# Patient Record
Sex: FEMALE | Race: WHITE | NOT HISPANIC OR LATINO | Employment: FULL TIME | URBAN - METROPOLITAN AREA
[De-identification: names, ages, dates, MRNs, and addresses within clinical notes are randomized per-mention and may not be internally consistent; named-entity substitution may affect disease eponyms.]

---

## 2017-02-03 ENCOUNTER — OFFICE VISIT (OUTPATIENT)
Dept: FAMILY MEDICINE CLINIC | Facility: CLINIC | Age: 32
End: 2017-02-03

## 2017-02-03 VITALS
OXYGEN SATURATION: 98 % | DIASTOLIC BLOOD PRESSURE: 82 MMHG | SYSTOLIC BLOOD PRESSURE: 138 MMHG | HEART RATE: 84 BPM | TEMPERATURE: 97.9 F | BODY MASS INDEX: 44.9 KG/M2 | RESPIRATION RATE: 16 BRPM | WEIGHT: 263 LBS | HEIGHT: 64 IN

## 2017-02-03 DIAGNOSIS — F41.9 ANXIETY: Primary | ICD-10-CM

## 2017-02-03 LAB
ALBUMIN SERPL-MCNC: 4.5 G/DL (ref 3.2–4.8)
ALBUMIN/GLOB SERPL: 1.4 G/DL (ref 1.5–2.5)
ALP SERPL-CCNC: 81 U/L (ref 25–100)
ALT SERPL-CCNC: 24 U/L (ref 7–40)
AST SERPL-CCNC: 26 U/L (ref 0–33)
BASOPHILS # BLD AUTO: 0.03 10*3/MM3 (ref 0–0.2)
BASOPHILS NFR BLD AUTO: 0.3 % (ref 0–1)
BILIRUB SERPL-MCNC: 0.3 MG/DL (ref 0.3–1.2)
BUN SERPL-MCNC: 11 MG/DL (ref 9–23)
BUN/CREAT SERPL: 15.7 (ref 7–25)
CALCIUM SERPL-MCNC: 9.9 MG/DL (ref 8.7–10.4)
CHLORIDE SERPL-SCNC: 107 MMOL/L (ref 99–109)
CHOLEST SERPL-MCNC: 219 MG/DL (ref 0–200)
CO2 SERPL-SCNC: 26 MMOL/L (ref 20–31)
CREAT SERPL-MCNC: 0.7 MG/DL (ref 0.6–1.3)
EOSINOPHIL # BLD AUTO: 0.11 10*3/MM3 (ref 0.1–0.3)
EOSINOPHIL NFR BLD AUTO: 0.9 % (ref 0–3)
ERYTHROCYTE [DISTWIDTH] IN BLOOD BY AUTOMATED COUNT: 13.6 % (ref 11.3–14.5)
GLOBULIN SER CALC-MCNC: 3.3 GM/DL
GLUCOSE SERPL-MCNC: 92 MG/DL (ref 70–100)
HCT VFR BLD AUTO: 41.6 % (ref 34.5–44)
HDLC SERPL-MCNC: 44 MG/DL (ref 40–60)
HGB BLD-MCNC: 14.1 G/DL (ref 11.5–15.5)
IMM GRANULOCYTES # BLD: 0.05 10*3/MM3 (ref 0–0.03)
IMM GRANULOCYTES NFR BLD: 0.4 % (ref 0–0.6)
LDLC SERPL CALC-MCNC: 149 MG/DL (ref 0–100)
LYMPHOCYTES # BLD AUTO: 2.51 10*3/MM3 (ref 0.6–4.8)
LYMPHOCYTES NFR BLD AUTO: 21 % (ref 24–44)
MCH RBC QN AUTO: 29.8 PG (ref 27–31)
MCHC RBC AUTO-ENTMCNC: 33.9 G/DL (ref 32–36)
MCV RBC AUTO: 87.9 FL (ref 80–99)
MONOCYTES # BLD AUTO: 0.51 10*3/MM3 (ref 0–1)
MONOCYTES NFR BLD AUTO: 4.3 % (ref 0–12)
NEUTROPHILS # BLD AUTO: 8.72 10*3/MM3 (ref 1.5–8.3)
NEUTROPHILS NFR BLD AUTO: 73.1 % (ref 41–71)
PLATELET # BLD AUTO: 318 10*3/MM3 (ref 150–450)
POTASSIUM SERPL-SCNC: 4.4 MMOL/L (ref 3.5–5.5)
PROT SERPL-MCNC: 7.8 G/DL (ref 5.7–8.2)
RBC # BLD AUTO: 4.73 10*6/MM3 (ref 3.89–5.14)
SODIUM SERPL-SCNC: 139 MMOL/L (ref 132–146)
TRIGL SERPL-MCNC: 131 MG/DL (ref 0–150)
TSH SERPL DL<=0.005 MIU/L-ACNC: 1.11 MIU/ML (ref 0.35–5.35)
VLDLC SERPL CALC-MCNC: 26.2 MG/DL
WBC # BLD AUTO: 11.93 10*3/MM3 (ref 3.5–10.8)

## 2017-02-03 PROCEDURE — 99203 OFFICE O/P NEW LOW 30 MIN: CPT | Performed by: FAMILY MEDICINE

## 2017-02-03 PROCEDURE — 36415 COLL VENOUS BLD VENIPUNCTURE: CPT | Performed by: FAMILY MEDICINE

## 2017-02-03 RX ORDER — PHENTERMINE HYDROCHLORIDE 30 MG/1
37.5 CAPSULE ORAL EVERY MORNING
COMMUNITY
End: 2017-07-27

## 2017-02-03 NOTE — PROGRESS NOTES
Subjective   Souleymane Grossman is a 31 y.o. female.     History of Present Illness   Establish Care- has a primary care in [place] that retired.  Has a history of Anxiety. On zoloft and uses xanax as needed. Pt concerned about ADD. Went to [facility] and was not diagnosed with ADD. Pt concerned about decreased focus and concentration.  C/O increased sweating on face. Periods regular. Not currently trying to get pregnant. Has has 100 lb weight gain in 2 years. + heat intolerance, night sweats.  The following portions of the patient's history were reviewed and updated as appropriate: allergies, current medications, past family history, past medical history, past social history, past surgical history and problem list.    Review of Systems   Constitutional: Negative for appetite change, chills, diaphoresis, fatigue, fever and unexpected weight change.   HENT: Negative for congestion, ear pain, mouth sores, postnasal drip, rhinorrhea, sinus pressure, sneezing, sore throat and trouble swallowing.    Eyes: Negative for pain, redness and visual disturbance.   Respiratory: Negative for apnea, cough, chest tightness, shortness of breath and wheezing.    Cardiovascular: Negative for chest pain, palpitations and leg swelling.   Gastrointestinal: Negative for abdominal distention, blood in stool, constipation, diarrhea and nausea.   Endocrine: Negative for cold intolerance, polydipsia, polyphagia and polyuria.   Genitourinary: Negative for difficulty urinating, dysuria, enuresis, flank pain and urgency.   Musculoskeletal: Negative for arthralgias, back pain, joint swelling, myalgias and neck pain.   Skin: Negative for color change, rash and wound.   Neurological: Negative for dizziness, syncope, weakness, light-headedness and numbness.   Hematological: Negative for adenopathy.   Psychiatric/Behavioral: Negative for agitation, behavioral problems and confusion. The patient is not nervous/anxious.         Objective   Physical Exam   Constitutional: She is oriented to person, place, and time. She appears well-developed and well-nourished. No distress.   HENT:   Right Ear: External ear normal.   Left Ear: External ear normal.   Nose: Nose normal.   Mouth/Throat: Oropharynx is clear and moist.   Eyes: Conjunctivae and EOM are normal. Pupils are equal, round, and reactive to light.   Neck: Normal range of motion. Neck supple. No thyromegaly present.   Cardiovascular: Normal rate, regular rhythm and normal heart sounds.    No murmur heard.  Pulmonary/Chest: Effort normal and breath sounds normal. No respiratory distress. She has no wheezes.   Abdominal: Soft. Bowel sounds are normal. She exhibits no distension and no mass. There is no tenderness. There is no rebound and no guarding. No hernia.   Musculoskeletal: Normal range of motion. She exhibits no edema or tenderness.   Lymphadenopathy:     She has no cervical adenopathy.   Neurological: She is alert and oriented to person, place, and time. She has normal reflexes.   Skin: Skin is warm and dry. No rash noted. She is not diaphoretic. No erythema. No pallor.   Psychiatric: She has a normal mood and affect. Her behavior is normal. Judgment and thought content normal.   Nursing note and vitals reviewed.      Assessment/Plan   Souleymane was seen today for establish care.    Diagnoses and all orders for this visit:    Anxiety  -     CBC & Differential  -     Comprehensive Metabolic Panel  -     Lipid Panel  -     TSH    I have recommended the patient follow-up with behavioral health for correct medication management

## 2017-04-28 LAB
EXTERNAL CHLAMYDIA SCREEN: NEGATIVE
EXTERNAL CHLAMYDIA SCREEN: POSITIVE
EXTERNAL GONORRHEA SCREEN: NEGATIVE
EXTERNAL HERPES PCR: POSITIVE
EXTERNAL RUBELLA QUALITATIVE: NORMAL
EXTERNAL SYPHILIS RPR SCREEN: NEGATIVE
EXTERNAL URINE DRUG SCREEN: NEGATIVE
HIV1 P24 AG SERPL QL IA: NEGATIVE

## 2017-07-27 ENCOUNTER — OFFICE VISIT (OUTPATIENT)
Dept: OBSTETRICS AND GYNECOLOGY | Facility: HOSPITAL | Age: 32
End: 2017-07-27

## 2017-07-27 ENCOUNTER — HOSPITAL ENCOUNTER (OUTPATIENT)
Dept: WOMENS IMAGING | Facility: HOSPITAL | Age: 32
Discharge: HOME | End: 2017-07-27
Admitting: ADVANCED PRACTICE MIDWIFE

## 2017-07-27 VITALS — BODY MASS INDEX: 48.71 KG/M2 | WEIGHT: 283.8 LBS | SYSTOLIC BLOOD PRESSURE: 123 MMHG | DIASTOLIC BLOOD PRESSURE: 79 MMHG

## 2017-07-27 DIAGNOSIS — O24.410 DIET CONTROLLED GESTATIONAL DIABETES MELLITUS (GDM), ANTEPARTUM: Primary | ICD-10-CM

## 2017-07-27 DIAGNOSIS — O24.419 GESTATIONAL DIABETES MELLITUS IN PREGNANCY: ICD-10-CM

## 2017-07-27 DIAGNOSIS — O35.9XX0 TERATOGEN EXPOSURE IN CURRENT PREGNANCY, NOT APPLICABLE OR UNSPECIFIED FETUS: ICD-10-CM

## 2017-07-27 PROCEDURE — 99241 PR OFFICE CONSULTATION NEW/ESTAB PATIENT 15 MIN: CPT | Performed by: OBSTETRICS & GYNECOLOGY

## 2017-07-27 PROCEDURE — 76811 OB US DETAILED SNGL FETUS: CPT | Performed by: OBSTETRICS & GYNECOLOGY

## 2017-07-27 PROCEDURE — 76811 OB US DETAILED SNGL FETUS: CPT

## 2017-07-27 RX ORDER — HYDROXYZINE 50 MG/1
50 TABLET, FILM COATED ORAL 3 TIMES DAILY PRN
COMMUNITY
End: 2017-11-18 | Stop reason: HOSPADM

## 2017-07-27 RX ORDER — DOXYLAMINE SUCCINATE AND PYRIDOXINE HYDROCHLORIDE, DELAYED RELEASE TABLETS 10 MG/10 MG 10; 10 MG/1; MG/1
2 TABLET, DELAYED RELEASE ORAL NIGHTLY PRN
COMMUNITY
End: 2017-11-18 | Stop reason: HOSPADM

## 2017-07-27 NOTE — PROGRESS NOTES
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 States had abnormal 3 hour glucose test about 2-3 weeks ago. Has not had diabetes education this pregnancy. Is checking FSBS fasting and one hour after meals. See glucose values.  Declined genetic screening. Does not have any further appointments with Ms. Husainney; plans transfer of care to deliver at [facility]. States Dr. Kumar has been recommended by friends and she is amenable to seeing her.   "

## 2017-08-07 ENCOUNTER — APPOINTMENT (OUTPATIENT)
Dept: ULTRASOUND IMAGING | Facility: HOSPITAL | Age: 32
End: 2017-08-07

## 2017-08-07 ENCOUNTER — HOSPITAL ENCOUNTER (OUTPATIENT)
Facility: HOSPITAL | Age: 32
Setting detail: OBSERVATION
LOS: 1 days | Discharge: HOME | End: 2017-08-08
Attending: OBSTETRICS & GYNECOLOGY | Admitting: OBSTETRICS & GYNECOLOGY

## 2017-08-07 DIAGNOSIS — O24.410 DIET CONTROLLED GESTATIONAL DIABETES MELLITUS (GDM), ANTEPARTUM: Primary | ICD-10-CM

## 2017-08-07 DIAGNOSIS — O35.9XX0 TERATOGEN EXPOSURE IN CURRENT PREGNANCY, NOT APPLICABLE OR UNSPECIFIED FETUS: ICD-10-CM

## 2017-08-07 DIAGNOSIS — Z3A.23 23 WEEKS GESTATION OF PREGNANCY: ICD-10-CM

## 2017-08-07 PROBLEM — Z34.90 PREGNANCY: Status: ACTIVE | Noted: 2017-08-07

## 2017-08-07 LAB
ALBUMIN SERPL-MCNC: 3.9 G/DL (ref 3.2–4.8)
ALBUMIN/GLOB SERPL: 1.3 G/DL (ref 1.5–2.5)
ALP SERPL-CCNC: 54 U/L (ref 25–100)
ALT SERPL W P-5'-P-CCNC: 14 U/L (ref 7–40)
ANION GAP SERPL CALCULATED.3IONS-SCNC: 6 MMOL/L (ref 3–11)
AST SERPL-CCNC: 15 U/L (ref 0–33)
BACTERIA UR QL AUTO: ABNORMAL /HPF
BILIRUB SERPL-MCNC: 0.2 MG/DL (ref 0.3–1.2)
BILIRUB UR QL STRIP: ABNORMAL
BUN BLD-MCNC: 10 MG/DL (ref 9–23)
BUN/CREAT SERPL: 16.7 (ref 7–25)
CALCIUM SPEC-SCNC: 9.3 MG/DL (ref 8.7–10.4)
CHLORIDE SERPL-SCNC: 108 MMOL/L (ref 99–109)
CLARITY UR: ABNORMAL
CO2 SERPL-SCNC: 23 MMOL/L (ref 20–31)
COLOR UR: ABNORMAL
CREAT BLD-MCNC: 0.6 MG/DL (ref 0.6–1.3)
DEPRECATED RDW RBC AUTO: 44.4 FL (ref 37–54)
ERYTHROCYTE [DISTWIDTH] IN BLOOD BY AUTOMATED COUNT: 14 % (ref 11.3–14.5)
GFR SERPL CREATININE-BSD FRML MDRD: 115 ML/MIN/1.73
GLOBULIN UR ELPH-MCNC: 2.9 GM/DL
GLUCOSE BLD-MCNC: 111 MG/DL (ref 70–100)
GLUCOSE BLDC GLUCOMTR-MCNC: 154 MG/DL (ref 70–130)
GLUCOSE UR STRIP-MCNC: NEGATIVE MG/DL
HCT VFR BLD AUTO: 35.6 % (ref 34.5–44)
HGB BLD-MCNC: 12.1 G/DL (ref 11.5–15.5)
HGB UR QL STRIP.AUTO: ABNORMAL
HYALINE CASTS UR QL AUTO: ABNORMAL /LPF
KETONES UR QL STRIP: ABNORMAL
LEUKOCYTE ESTERASE UR QL STRIP.AUTO: ABNORMAL
MCH RBC QN AUTO: 29.6 PG (ref 27–31)
MCHC RBC AUTO-ENTMCNC: 34 G/DL (ref 32–36)
MCV RBC AUTO: 87 FL (ref 80–99)
NITRITE UR QL STRIP: POSITIVE
PH UR STRIP.AUTO: 6.5 [PH] (ref 5–8)
PLATELET # BLD AUTO: 299 10*3/MM3 (ref 150–450)
PMV BLD AUTO: 10.4 FL (ref 6–12)
POTASSIUM BLD-SCNC: 3.9 MMOL/L (ref 3.5–5.5)
PROT SERPL-MCNC: 6.8 G/DL (ref 5.7–8.2)
PROT UR QL STRIP: ABNORMAL
RBC # BLD AUTO: 4.09 10*6/MM3 (ref 3.89–5.14)
RBC # UR: ABNORMAL /HPF
REF LAB TEST METHOD: ABNORMAL
SODIUM BLD-SCNC: 137 MMOL/L (ref 132–146)
SP GR UR STRIP: 1.02 (ref 1–1.03)
SQUAMOUS #/AREA URNS HPF: ABNORMAL /HPF
UROBILINOGEN UR QL STRIP: ABNORMAL
WBC NRBC COR # BLD: 18.58 10*3/MM3 (ref 3.5–10.8)
WBC UR QL AUTO: ABNORMAL /HPF
YEAST URNS QL MICRO: ABNORMAL /HPF

## 2017-08-07 PROCEDURE — 25010000002 THIAMINE PER 100 MG: Performed by: OBSTETRICS & GYNECOLOGY

## 2017-08-07 PROCEDURE — 82962 GLUCOSE BLOOD TEST: CPT

## 2017-08-07 PROCEDURE — 81001 URINALYSIS AUTO W/SCOPE: CPT | Performed by: OBSTETRICS & GYNECOLOGY

## 2017-08-07 PROCEDURE — 80053 COMPREHEN METABOLIC PANEL: CPT | Performed by: OBSTETRICS & GYNECOLOGY

## 2017-08-07 PROCEDURE — 25010000002 PYRIDOXINE PER 100 MG: Performed by: OBSTETRICS & GYNECOLOGY

## 2017-08-07 PROCEDURE — 96375 TX/PRO/DX INJ NEW DRUG ADDON: CPT

## 2017-08-07 PROCEDURE — 25010000002 BUTORPHANOL PER 1 MG: Performed by: OBSTETRICS & GYNECOLOGY

## 2017-08-07 PROCEDURE — 96366 THER/PROPH/DIAG IV INF ADDON: CPT

## 2017-08-07 PROCEDURE — 99218 PR INITIAL OBSERVATION CARE/DAY 30 MINUTES: CPT | Performed by: OBSTETRICS & GYNECOLOGY

## 2017-08-07 PROCEDURE — 25010000002 HYDROMORPHONE PER 4 MG: Performed by: OBSTETRICS & GYNECOLOGY

## 2017-08-07 PROCEDURE — G0378 HOSPITAL OBSERVATION PER HR: HCPCS

## 2017-08-07 PROCEDURE — 76775 US EXAM ABDO BACK WALL LIM: CPT

## 2017-08-07 PROCEDURE — 25010000002 PROMETHAZINE PER 50 MG: Performed by: OBSTETRICS & GYNECOLOGY

## 2017-08-07 PROCEDURE — 96376 TX/PRO/DX INJ SAME DRUG ADON: CPT

## 2017-08-07 PROCEDURE — 25010000002 ONDANSETRON PER 1 MG: Performed by: OBSTETRICS & GYNECOLOGY

## 2017-08-07 PROCEDURE — 85027 COMPLETE CBC AUTOMATED: CPT | Performed by: OBSTETRICS & GYNECOLOGY

## 2017-08-07 PROCEDURE — 96365 THER/PROPH/DIAG IV INF INIT: CPT

## 2017-08-07 RX ORDER — SODIUM CHLORIDE, SODIUM LACTATE, POTASSIUM CHLORIDE, CALCIUM CHLORIDE 600; 310; 30; 20 MG/100ML; MG/100ML; MG/100ML; MG/100ML
125 INJECTION, SOLUTION INTRAVENOUS CONTINUOUS
Status: DISCONTINUED | OUTPATIENT
Start: 2017-08-07 | End: 2017-08-08 | Stop reason: HOSPADM

## 2017-08-07 RX ORDER — SODIUM CHLORIDE 0.9 % (FLUSH) 0.9 %
1-10 SYRINGE (ML) INJECTION AS NEEDED
Status: DISCONTINUED | OUTPATIENT
Start: 2017-08-07 | End: 2017-08-08 | Stop reason: HOSPADM

## 2017-08-07 RX ORDER — HYDROXYZINE HYDROCHLORIDE 25 MG/1
50 TABLET, FILM COATED ORAL 3 TIMES DAILY PRN
Status: DISCONTINUED | OUTPATIENT
Start: 2017-08-07 | End: 2017-08-08 | Stop reason: HOSPADM

## 2017-08-07 RX ORDER — PROMETHAZINE HYDROCHLORIDE 25 MG/ML
12.5 INJECTION, SOLUTION INTRAMUSCULAR; INTRAVENOUS ONCE
Status: COMPLETED | OUTPATIENT
Start: 2017-08-07 | End: 2017-08-07

## 2017-08-07 RX ORDER — ONDANSETRON 2 MG/ML
4 INJECTION INTRAMUSCULAR; INTRAVENOUS EVERY 6 HOURS PRN
Status: DISCONTINUED | OUTPATIENT
Start: 2017-08-07 | End: 2017-08-08 | Stop reason: HOSPADM

## 2017-08-07 RX ORDER — ONDANSETRON 4 MG/1
8 TABLET, FILM COATED ORAL EVERY 8 HOURS PRN
Status: DISCONTINUED | OUTPATIENT
Start: 2017-08-07 | End: 2017-08-08 | Stop reason: HOSPADM

## 2017-08-07 RX ORDER — SERTRALINE HYDROCHLORIDE 100 MG/1
100 TABLET, FILM COATED ORAL DAILY
Status: DISCONTINUED | OUTPATIENT
Start: 2017-08-07 | End: 2017-08-08 | Stop reason: HOSPADM

## 2017-08-07 RX ORDER — NALOXONE HCL 0.4 MG/ML
0.4 VIAL (ML) INJECTION
Status: DISCONTINUED | OUTPATIENT
Start: 2017-08-07 | End: 2017-08-08 | Stop reason: HOSPADM

## 2017-08-07 RX ADMIN — BUTORPHANOL TARTRATE 2 MG: 2 INJECTION, SOLUTION INTRAMUSCULAR; INTRAVENOUS at 15:10

## 2017-08-07 RX ADMIN — PROMETHAZINE HYDROCHLORIDE 12.5 MG: 25 INJECTION, SOLUTION INTRAMUSCULAR; INTRAVENOUS at 22:13

## 2017-08-07 RX ADMIN — ONDANSETRON 4 MG: 2 INJECTION INTRAMUSCULAR; INTRAVENOUS at 13:54

## 2017-08-07 RX ADMIN — HYDROMORPHONE HYDROCHLORIDE 1 MG: 1 INJECTION, SOLUTION INTRAMUSCULAR; INTRAVENOUS; SUBCUTANEOUS at 22:13

## 2017-08-07 RX ADMIN — SODIUM CHLORIDE, POTASSIUM CHLORIDE, SODIUM LACTATE AND CALCIUM CHLORIDE 1000 ML: 600; 310; 30; 20 INJECTION, SOLUTION INTRAVENOUS at 13:51

## 2017-08-07 RX ADMIN — HYDROMORPHONE HYDROCHLORIDE 1 MG: 1 INJECTION, SOLUTION INTRAMUSCULAR; INTRAVENOUS; SUBCUTANEOUS at 20:08

## 2017-08-07 RX ADMIN — THIAMINE HYDROCHLORIDE 125 ML/HR: 100 INJECTION, SOLUTION INTRAMUSCULAR; INTRAVENOUS at 14:02

## 2017-08-07 RX ADMIN — SODIUM CHLORIDE, POTASSIUM CHLORIDE, SODIUM LACTATE AND CALCIUM CHLORIDE 125 ML/HR: 600; 310; 30; 20 INJECTION, SOLUTION INTRAVENOUS at 14:23

## 2017-08-07 RX ADMIN — HYDROMORPHONE HYDROCHLORIDE 1 MG: 1 INJECTION, SOLUTION INTRAMUSCULAR; INTRAVENOUS; SUBCUTANEOUS at 13:47

## 2017-08-07 NOTE — H&P
[facility]  Obstetric History and Physical    No chief complaint on file.      Subjective     Patient is a 32 y.o. female  currently at 23w6d, who presents with c/o acute onset left flank pain 1400 yesterday. She rates pain 7/10, pain intermittent with blood noted in urine. She denies fever, ROM, regular contractions,or any other associated symptoms.PNC complicated by GDMA1 well controlled.      Her prenatal care is complicated by  diabetes  GDM A1\ .    The following portions of the patients history were reviewed and updated as appropriate: current medications, allergies, past medical history, past surgical history, past family history, past social history and problem list .       Prenatal Information:   Maternal Prenatal Labs  Blood Type No results found for: ABO   Rh Status No results found for: RH   Antibody Screen No results found for: ABSCRN   Gonnorhea No results found for: GCCX   Chlamydia No results found for: CLAMYDCU   RPR No results found for: RPR   Syphilis Antibody No results found for: SYPHILIS   Rubella No results found for: RUBELLAIGGIN   Hepatitis B Surface Antigen No results found for: HEPBSAG   HIV-1 Antibody No results found for: LABHIV1   Hepatitis C Antibody No results found for: HEPCAB   Rapid Urin Drug Screen No results found for: AMPMETHU, BARBITSCNUR, LABBENZSCN, LABMETHSCN, LABOPIASCN, THCURSCR, COCAINEUR, AMPHETSCREEN, PROPOXSCN, BUPRENORSCNU, METAMPSCNUR, OXYCODONESCN, TRICYCLICSCN   Group B Strep Culture No results found for: GBSANTIGEN                 Past OB History:       Obstetric History       T1      TAB0   SAB0   E0   M0   L1       # Outcome Date GA Lbr Vincent/2nd Weight Sex Delivery Anes PTL Lv   2 Current            1 Term 14 38w0d  7 lb 3 oz (3.26 kg) F Vag-Spont  N Y      Complications: Gestational diabetes,Gestational hypertension,Status post induction of labor          Past Medical History: Past Medical History:   Diagnosis Date   • Anxiety      PCP manages   • Anxiety    • Blood in urine    • Chills    • Chronic headaches    • Fatigue    • Gestational diabetes 2017    took oral meds in 2014 pregnancy; recently dx in 2017 pregnancy    • Kidney stones    • Mood changes    • Nausea    • Night sweats    • Obesity, Class III, BMI 40-49.9 (morbid obesity)    • Weight gain       Past Surgical History Past Surgical History:   Procedure Laterality Date   • CYSTOSCOPY BLADDER STONE LITHOTRIPSY  02/2017    reports has 2 remaining stones   • WISDOM TOOTH EXTRACTION     • WISDOM TOOTH EXTRACTION        Family History: Family History   Problem Relation Age of Onset   • Hypertension Father    • Diabetes Mother    • Cancer Maternal Grandmother    • Diabetes Brother       Social History:  reports that she has been smoking Cigarettes.  She has been smoking about 0.25 packs per day. She has never used smokeless tobacco.   reports that she does not drink alcohol.   reports that she does not use illicit drugs.                   General ROS Negative Findings:Headaches, Visual Changes, Epigastric pain, Anorexia, ROM and Vaginal Bleeding    ROS      Objective       Vital Signs Range for the last 24 hours  Temperature: Temp:  [98.2 °F (36.8 °C)] 98.2 °F (36.8 °C)   Temp Source:     BP:     Pulse:     Respirations: Resp:  [20] 20   SPO2:     O2 Amount (l/min):     O2 Devices     Weight:       Physical Examination:   General:   alert, appears stated age and cooperative   Skin:   normal   HEENT:     Lungs:   clear to auscultation bilaterally   Heart:   regular rate and rhythm, S1, S2 normal, no murmur, click, rub or gallop   Abdomen:  soft, gravid uterus non tender  No guarding no rebound, neg CVA tenderness   Lower Extremeties Tr edema, no calf tenderness, DTR 2+/4   Pelvis:  Exam deferred.         Presentation:    Cervix: Exam by:     Dilation:     Effacement:     Station:         Fetal Heart Rate Assessment   Method:     Beats/min:     Baseline:     Varibility:     Accels:      Decels:     Tracing Category:       Uterine Assessment   Method:     Frequency (min):     Ctx Count in 10 min:     Duration:     Intensity:     Intensity by IUPC:     Resting Tone:     Resting Tone by IUPC:     Harish Units:       Laboratory Results: @coonbamj25@  Radiology Review:@lastrad@  Other Studies:    Assessment/Plan     Active Problems:    Diet controlled gestational diabetes mellitus (GDM), antepartum    Pregnancy        Assessment:  1.  Intrauterine pregnancy at 23w6d weeks gestation with reassuring fetal status.    2.  Left flank ( gross hematuria) suspect ureteral sone  3.  Hx of stones S/P lithotripsy 2/17   4.      Plan:  1. IV hydration, pain control, renal U/S, CMP, UA, and cbc  2. Plan of care has been reviewed with patient.  3.  Risks, benefits of treatment plan have been discussed.  4.  All questions have been answered.  5      Sam Munoz DO  8/7/2017  1:24 PM

## 2017-08-08 VITALS
WEIGHT: 280 LBS | TEMPERATURE: 98 F | HEART RATE: 82 BPM | BODY MASS INDEX: 47.8 KG/M2 | HEIGHT: 64 IN | DIASTOLIC BLOOD PRESSURE: 63 MMHG | SYSTOLIC BLOOD PRESSURE: 124 MMHG | RESPIRATION RATE: 18 BRPM

## 2017-08-08 PROBLEM — O35.9XX0 TERATOGEN EXPOSURE IN CURRENT PREGNANCY: Status: RESOLVED | Noted: 2017-07-27 | Resolved: 2017-08-08

## 2017-08-08 PROBLEM — Z34.90 PREGNANCY: Status: RESOLVED | Noted: 2017-08-07 | Resolved: 2017-08-08

## 2017-08-08 PROBLEM — O24.410 DIET CONTROLLED GESTATIONAL DIABETES MELLITUS (GDM), ANTEPARTUM: Status: RESOLVED | Noted: 2017-07-27 | Resolved: 2017-08-08

## 2017-08-08 LAB
GLUCOSE BLDC GLUCOMTR-MCNC: 111 MG/DL (ref 70–130)
GLUCOSE BLDC GLUCOMTR-MCNC: 111 MG/DL (ref 70–130)
GLUCOSE BLDC GLUCOMTR-MCNC: 119 MG/DL (ref 70–130)

## 2017-08-08 PROCEDURE — 96366 THER/PROPH/DIAG IV INF ADDON: CPT

## 2017-08-08 PROCEDURE — 25010000002 THIAMINE PER 100 MG: Performed by: OBSTETRICS & GYNECOLOGY

## 2017-08-08 PROCEDURE — 25010000002 PYRIDOXINE PER 100 MG: Performed by: OBSTETRICS & GYNECOLOGY

## 2017-08-08 PROCEDURE — 96376 TX/PRO/DX INJ SAME DRUG ADON: CPT

## 2017-08-08 PROCEDURE — 25010000002 PROMETHAZINE PER 50 MG: Performed by: OBSTETRICS & GYNECOLOGY

## 2017-08-08 PROCEDURE — G0378 HOSPITAL OBSERVATION PER HR: HCPCS

## 2017-08-08 PROCEDURE — 25010000002 HYDROMORPHONE PER 4 MG: Performed by: OBSTETRICS & GYNECOLOGY

## 2017-08-08 PROCEDURE — 82962 GLUCOSE BLOOD TEST: CPT

## 2017-08-08 RX ORDER — NITROFURANTOIN 25; 75 MG/1; MG/1
100 CAPSULE ORAL EVERY 12 HOURS SCHEDULED
Qty: 14 CAPSULE | Refills: 0 | Status: SHIPPED | OUTPATIENT
Start: 2017-08-08 | End: 2017-08-15

## 2017-08-08 RX ORDER — PROMETHAZINE HYDROCHLORIDE 25 MG/ML
12.5 INJECTION, SOLUTION INTRAMUSCULAR; INTRAVENOUS EVERY 6 HOURS PRN
Status: DISCONTINUED | OUTPATIENT
Start: 2017-08-08 | End: 2017-08-08 | Stop reason: HOSPADM

## 2017-08-08 RX ORDER — ACETAMINOPHEN 325 MG/1
650 TABLET ORAL 4 TIMES DAILY PRN
Status: DISCONTINUED | OUTPATIENT
Start: 2017-08-08 | End: 2017-08-08 | Stop reason: HOSPADM

## 2017-08-08 RX ORDER — OXYCODONE HYDROCHLORIDE AND ACETAMINOPHEN 5; 325 MG/1; MG/1
1 TABLET ORAL EVERY 4 HOURS PRN
Status: DISCONTINUED | OUTPATIENT
Start: 2017-08-08 | End: 2017-08-08 | Stop reason: HOSPADM

## 2017-08-08 RX ORDER — NITROFURANTOIN 25; 75 MG/1; MG/1
100 CAPSULE ORAL EVERY 12 HOURS SCHEDULED
Status: DISCONTINUED | OUTPATIENT
Start: 2017-08-08 | End: 2017-08-08 | Stop reason: HOSPADM

## 2017-08-08 RX ORDER — OXYCODONE HYDROCHLORIDE AND ACETAMINOPHEN 5; 325 MG/1; MG/1
1 TABLET ORAL EVERY 4 HOURS PRN
Qty: 24 TABLET | Refills: 0 | Status: SHIPPED | OUTPATIENT
Start: 2017-08-08 | End: 2017-08-18

## 2017-08-08 RX ADMIN — ACETAMINOPHEN 650 MG: 325 TABLET, FILM COATED ORAL at 09:30

## 2017-08-08 RX ADMIN — NITROFURANTOIN (MONOHYDRATE/MACROCRYSTALS) 100 MG: 75; 25 CAPSULE ORAL at 09:30

## 2017-08-08 RX ADMIN — HYDROMORPHONE HYDROCHLORIDE 1 MG: 1 INJECTION, SOLUTION INTRAMUSCULAR; INTRAVENOUS; SUBCUTANEOUS at 05:33

## 2017-08-08 RX ADMIN — HYDROMORPHONE HYDROCHLORIDE 1 MG: 1 INJECTION, SOLUTION INTRAMUSCULAR; INTRAVENOUS; SUBCUTANEOUS at 02:49

## 2017-08-08 RX ADMIN — SERTRALINE 100 MG: 100 TABLET, FILM COATED ORAL at 08:02

## 2017-08-08 RX ADMIN — OXYCODONE AND ACETAMINOPHEN 1 TABLET: 5; 325 TABLET ORAL at 11:36

## 2017-08-08 RX ADMIN — SODIUM CHLORIDE, POTASSIUM CHLORIDE, SODIUM LACTATE AND CALCIUM CHLORIDE 125 ML/HR: 600; 310; 30; 20 INJECTION, SOLUTION INTRAVENOUS at 02:52

## 2017-08-08 RX ADMIN — THIAMINE HYDROCHLORIDE 125 ML/HR: 100 INJECTION, SOLUTION INTRAMUSCULAR; INTRAVENOUS at 09:00

## 2017-08-08 RX ADMIN — HYDROMORPHONE HYDROCHLORIDE 1 MG: 1 INJECTION, SOLUTION INTRAMUSCULAR; INTRAVENOUS; SUBCUTANEOUS at 08:07

## 2017-08-08 RX ADMIN — PROMETHAZINE HYDROCHLORIDE 12.5 MG: 25 INJECTION INTRAMUSCULAR; INTRAVENOUS at 05:32

## 2017-08-08 NOTE — PROGRESS NOTES
Patient has had no more pain.  She had a normal renal ultrasound.  He is going to go home now on Macrobid 100 twice a day Percocet and Phenergan he will return to see us in 2 weeks.

## 2017-08-08 NOTE — NURSING NOTE
1230- d/c instructions given to pt to include uti sx,  labor precautions, caring for iv infiltration site, and when to return to hospital. Follow up appt made for pt for  @10:20, pt refused wc transfer. Pt ambulating to private vehicle accompanied by  and has all belongings

## 2017-08-08 NOTE — DISCHARGE SUMMARY
[facility]  Patient Name: Souleymane Grossman  : 1985  MRN: [id number]  Date of Service: 2017  Referring Provider: Hanna Kirby     ID: 32 y.o.  at 24w0d    Admission Diagnosis: Pregnancy [Z33.1]  Diet controlled gestational diabetes mellitus (GDM), antepartum [O24.410]    Discharge Diagnosis:   Patient Active Problem List   Diagnosis   (none) - all problems resolved or deleted       Date of Admission: 2017 12:36 PM    Date of Discharge: 2017    Discharge Condition: Stable    Discharge to: Home    Discharge Medications:    Souleymane Grossman   Home Medication Instructions LESA:[id number]    Printed on:17 1206   Medication Information                      ALPRAZolam (XANAX) 1 MG tablet  1 mg. Took 1/2 tablet once 2017.             doxylamine-pyridoxine (DICLEGIS) 10-10 MG tablet delayed-release EC tablet  Take 2 tablets by mouth At Night As Needed.             hydrOXYzine (ATARAX) 50 MG tablet  Take 50 mg by mouth 3 (Three) Times a Day As Needed for Anxiety.             nitrofurantoin, macrocrystal-monohydrate, (MACROBID) 100 MG capsule  Take 1 capsule by mouth Every 12 (Twelve) Hours for 7 days. Indications: Prevention of Frequently Occuring Urinary Tract Infections             ondansetron (ZOFRAN) 8 MG tablet  Take 1 tablet by mouth Every 8 (Eight) Hours As Needed for nausea or vomiting.             oxyCODONE-acetaminophen (PERCOCET) 5-325 MG per tablet  Take 1 tablet by mouth Every 4 (Four) Hours As Needed for Moderate Pain (4-6) for up to 10 days.             sertraline (ZOLOFT) 100 MG tablet  Take 100 mg by mouth Daily. Recently restarted @ 50 mg daily, will increase to 100 mg.                 Discharge Diet:     Discharge Activity:    Follow up appointments:   Your Scheduled Appointments     Sep 21, 2017 11:00 AM EDT   Follow Up with [facility] DEPT SCHEDULE   [facility] (--)    [address] [facility] [address]   [phone]           Arrive 15  minutes prior to appointment.            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                  Hospital summary:      Souleymane was admitted for <principal problem not specified>.    She did not receive a course of  corticosteroids during her admission.      She did not receive magnesium sulfate during her admission.      Her obstetric ultrasounds and fetal testing were reassuring during her admission.  Her condition was determined to be appropriate for outpatient management and she was discharged home in stable condition.  She was instructed to follow up in 2 weeks with .    Hanna Kirby MD  12:06 PM

## 2017-09-21 ENCOUNTER — OFFICE VISIT (OUTPATIENT)
Dept: OBSTETRICS AND GYNECOLOGY | Facility: HOSPITAL | Age: 32
End: 2017-09-21

## 2017-09-21 ENCOUNTER — HOSPITAL ENCOUNTER (OUTPATIENT)
Dept: WOMENS IMAGING | Facility: HOSPITAL | Age: 32
Discharge: HOME | End: 2017-09-21
Attending: OBSTETRICS & GYNECOLOGY | Admitting: OBSTETRICS & GYNECOLOGY

## 2017-09-21 VITALS — SYSTOLIC BLOOD PRESSURE: 122 MMHG | WEIGHT: 286.4 LBS | DIASTOLIC BLOOD PRESSURE: 59 MMHG | BODY MASS INDEX: 49.16 KG/M2

## 2017-09-21 DIAGNOSIS — O35.9XX0 TERATOGEN EXPOSURE IN CURRENT PREGNANCY, NOT APPLICABLE OR UNSPECIFIED FETUS: ICD-10-CM

## 2017-09-21 DIAGNOSIS — O24.410 DIET CONTROLLED GESTATIONAL DIABETES MELLITUS (GDM), ANTEPARTUM: ICD-10-CM

## 2017-09-21 DIAGNOSIS — O24.419 GDM, CLASS A2: ICD-10-CM

## 2017-09-21 DIAGNOSIS — E66.01 MORBID OBESITY DUE TO EXCESS CALORIES (HCC): Primary | ICD-10-CM

## 2017-09-21 PROCEDURE — 76816 OB US FOLLOW-UP PER FETUS: CPT | Performed by: OBSTETRICS & GYNECOLOGY

## 2017-09-21 PROCEDURE — 76816 OB US FOLLOW-UP PER FETUS: CPT

## 2017-09-21 RX ORDER — RANITIDINE 150 MG/1
150 TABLET ORAL 2 TIMES DAILY PRN
COMMUNITY
End: 2017-11-18 | Stop reason: HOSPADM

## 2017-09-21 RX ORDER — LANCETS 28 GAUGE
EACH MISCELLANEOUS
COMMUNITY
Start: 2017-07-12 | End: 2019-12-12

## 2017-09-21 RX ORDER — BLOOD-GLUCOSE METER
KIT MISCELLANEOUS
COMMUNITY
Start: 2017-07-12 | End: 2017-11-18 | Stop reason: HOSPADM

## 2017-09-21 NOTE — PROGRESS NOTES
Denies problems. States Dr. Kirby is managing GDM; fasting glucose values are 114-120 and after meal values are normal per patient. To see Dr. Kirby today.

## 2017-09-21 NOTE — PROGRESS NOTES
Documentation of the ultrasound findings, images, and interpretations will be available in the patient's ViewPoint report located in the chart review imaging tab in [employer].

## 2017-10-30 ENCOUNTER — LAB REQUISITION (OUTPATIENT)
Dept: LAB | Facility: HOSPITAL | Age: 32
End: 2017-10-30

## 2017-10-30 DIAGNOSIS — Z34.83 ENCOUNTER FOR SUPERVISION OF OTHER NORMAL PREGNANCY, THIRD TRIMESTER: ICD-10-CM

## 2017-10-30 LAB — EXTERNAL GROUP B STREP ANTIGEN: NEGATIVE

## 2017-10-30 PROCEDURE — 87081 CULTURE SCREEN ONLY: CPT | Performed by: OBSTETRICS & GYNECOLOGY

## 2017-11-02 LAB — BACTERIA SPEC AEROBE CULT: NORMAL

## 2017-11-15 ENCOUNTER — PREP FOR SURGERY (OUTPATIENT)
Dept: OTHER | Facility: HOSPITAL | Age: 32
End: 2017-11-15

## 2017-11-15 RX ORDER — BUTORPHANOL TARTRATE 1 MG/ML
1 INJECTION, SOLUTION INTRAMUSCULAR; INTRAVENOUS
Status: CANCELLED | OUTPATIENT
Start: 2017-11-15

## 2017-11-15 RX ORDER — MISOPROSTOL 200 UG/1
800 TABLET ORAL AS NEEDED
Status: CANCELLED | OUTPATIENT
Start: 2017-11-15

## 2017-11-15 RX ORDER — MORPHINE SULFATE 2 MG/ML
2 INJECTION, SOLUTION INTRAMUSCULAR; INTRAVENOUS
Status: CANCELLED | OUTPATIENT
Start: 2017-11-15 | End: 2017-11-25

## 2017-11-15 RX ORDER — PROMETHAZINE HYDROCHLORIDE 12.5 MG/1
12.5 TABLET ORAL EVERY 6 HOURS PRN
Status: CANCELLED | OUTPATIENT
Start: 2017-11-15

## 2017-11-15 RX ORDER — PROMETHAZINE HYDROCHLORIDE 25 MG/ML
12.5 INJECTION, SOLUTION INTRAMUSCULAR; INTRAVENOUS EVERY 6 HOURS PRN
Status: CANCELLED | OUTPATIENT
Start: 2017-11-15

## 2017-11-15 RX ORDER — SODIUM CHLORIDE, SODIUM LACTATE, POTASSIUM CHLORIDE, CALCIUM CHLORIDE 600; 310; 30; 20 MG/100ML; MG/100ML; MG/100ML; MG/100ML
125 INJECTION, SOLUTION INTRAVENOUS CONTINUOUS
Status: CANCELLED | OUTPATIENT
Start: 2017-11-15

## 2017-11-15 RX ORDER — FAMOTIDINE 10 MG/ML
20 INJECTION, SOLUTION INTRAVENOUS EVERY 12 HOURS SCHEDULED
Status: CANCELLED | OUTPATIENT
Start: 2017-11-15

## 2017-11-15 RX ORDER — OXYTOCIN/RINGER'S LACTATE 20/1000 ML
125 PLASTIC BAG, INJECTION (ML) INTRAVENOUS CONTINUOUS PRN
Status: CANCELLED | OUTPATIENT
Start: 2017-11-15 | End: 2017-11-16

## 2017-11-15 RX ORDER — OXYCODONE AND ACETAMINOPHEN 7.5; 325 MG/1; MG/1
2 TABLET ORAL EVERY 4 HOURS PRN
Status: CANCELLED | OUTPATIENT
Start: 2017-11-15 | End: 2017-11-25

## 2017-11-15 RX ORDER — OXYTOCIN/RINGER'S LACTATE 30/500 ML
2-24 PLASTIC BAG, INJECTION (ML) INTRAVENOUS
Status: CANCELLED | OUTPATIENT
Start: 2017-11-16

## 2017-11-15 RX ORDER — CARBOPROST TROMETHAMINE 250 UG/ML
250 INJECTION, SOLUTION INTRAMUSCULAR AS NEEDED
Status: CANCELLED | OUTPATIENT
Start: 2017-11-15

## 2017-11-15 RX ORDER — OXYTOCIN/RINGER'S LACTATE 20/1000 ML
999 PLASTIC BAG, INJECTION (ML) INTRAVENOUS ONCE
Status: CANCELLED | OUTPATIENT
Start: 2017-11-15

## 2017-11-15 RX ORDER — TERBUTALINE SULFATE 1 MG/ML
0.25 INJECTION, SOLUTION SUBCUTANEOUS AS NEEDED
Status: CANCELLED | OUTPATIENT
Start: 2017-11-15

## 2017-11-15 RX ORDER — PROMETHAZINE HYDROCHLORIDE 12.5 MG/1
12.5 SUPPOSITORY RECTAL EVERY 6 HOURS PRN
Status: CANCELLED | OUTPATIENT
Start: 2017-11-15

## 2017-11-15 RX ORDER — ACETAMINOPHEN 325 MG/1
650 TABLET ORAL EVERY 4 HOURS PRN
Status: CANCELLED | OUTPATIENT
Start: 2017-11-15

## 2017-11-15 RX ORDER — SODIUM CHLORIDE 0.9 % (FLUSH) 0.9 %
1-10 SYRINGE (ML) INJECTION AS NEEDED
Status: CANCELLED | OUTPATIENT
Start: 2017-11-15

## 2017-11-15 RX ORDER — FAMOTIDINE 20 MG/1
20 TABLET, FILM COATED ORAL EVERY 12 HOURS SCHEDULED
Status: CANCELLED | OUTPATIENT
Start: 2017-11-15

## 2017-11-15 RX ORDER — LIDOCAINE HYDROCHLORIDE 10 MG/ML
5 INJECTION, SOLUTION INFILTRATION; PERINEURAL AS NEEDED
Status: CANCELLED | OUTPATIENT
Start: 2017-11-15

## 2017-11-15 RX ORDER — MAGNESIUM CARB/ALUMINUM HYDROX 105-160MG
30 TABLET,CHEWABLE ORAL ONCE
Status: CANCELLED | OUTPATIENT
Start: 2017-11-15 | End: 2017-11-15

## 2017-11-15 RX ORDER — ZOLPIDEM TARTRATE 5 MG/1
5 TABLET ORAL NIGHTLY PRN
Status: CANCELLED | OUTPATIENT
Start: 2017-11-15 | End: 2017-11-25

## 2017-11-15 RX ORDER — OXYCODONE HYDROCHLORIDE AND ACETAMINOPHEN 5; 325 MG/1; MG/1
1 TABLET ORAL EVERY 4 HOURS PRN
Status: CANCELLED | OUTPATIENT
Start: 2017-11-15 | End: 2017-11-25

## 2017-11-15 RX ORDER — METHYLERGONOVINE MALEATE 0.2 MG/ML
200 INJECTION INTRAVENOUS ONCE AS NEEDED
Status: CANCELLED | OUTPATIENT
Start: 2017-11-15

## 2017-11-16 ENCOUNTER — ANESTHESIA EVENT (OUTPATIENT)
Dept: LABOR AND DELIVERY | Facility: HOSPITAL | Age: 32
End: 2017-11-16

## 2017-11-16 ENCOUNTER — HOSPITAL ENCOUNTER (INPATIENT)
Dept: LABOR AND DELIVERY | Facility: HOSPITAL | Age: 32
LOS: 2 days | Discharge: HOME | End: 2017-11-18
Attending: OBSTETRICS & GYNECOLOGY | Admitting: OBSTETRICS & GYNECOLOGY

## 2017-11-16 ENCOUNTER — ANESTHESIA (OUTPATIENT)
Dept: LABOR AND DELIVERY | Facility: HOSPITAL | Age: 32
End: 2017-11-16

## 2017-11-16 PROBLEM — Z34.90 CURRENTLY PREGNANT: Status: ACTIVE | Noted: 2017-11-16

## 2017-11-16 LAB
ABO GROUP BLD: NORMAL
ALP SERPL-CCNC: 86 U/L (ref 25–100)
ALT SERPL W P-5'-P-CCNC: 14 U/L (ref 7–40)
AST SERPL-CCNC: 19 U/L (ref 0–33)
BILIRUB SERPL-MCNC: 0.2 MG/DL (ref 0.3–1.2)
BLD GP AB SCN SERPL QL: NEGATIVE
CREAT BLD-MCNC: 0.5 MG/DL (ref 0.6–1.3)
DEPRECATED RDW RBC AUTO: 45.6 FL (ref 37–54)
ERYTHROCYTE [DISTWIDTH] IN BLOOD BY AUTOMATED COUNT: 14.7 % (ref 11.3–14.5)
HCT VFR BLD AUTO: 36.2 % (ref 34.5–44)
HGB BLD-MCNC: 12.4 G/DL (ref 11.5–15.5)
LDH SERPL-CCNC: 195 U/L (ref 120–246)
MCH RBC QN AUTO: 29.3 PG (ref 27–31)
MCHC RBC AUTO-ENTMCNC: 34.3 G/DL (ref 32–36)
MCV RBC AUTO: 85.6 FL (ref 80–99)
PLATELET # BLD AUTO: 313 10*3/MM3 (ref 150–450)
PMV BLD AUTO: 10.2 FL (ref 6–12)
RBC # BLD AUTO: 4.23 10*6/MM3 (ref 3.89–5.14)
RH BLD: POSITIVE
URATE SERPL-MCNC: 5.5 MG/DL (ref 3.1–7.8)
WBC NRBC COR # BLD: 15.42 10*3/MM3 (ref 3.5–10.8)

## 2017-11-16 PROCEDURE — 25010000002 ROPIVACAINE PER 1 MG: Performed by: ANESTHESIOLOGY

## 2017-11-16 PROCEDURE — 84550 ASSAY OF BLOOD/URIC ACID: CPT | Performed by: OBSTETRICS & GYNECOLOGY

## 2017-11-16 PROCEDURE — 82247 BILIRUBIN TOTAL: CPT | Performed by: OBSTETRICS & GYNECOLOGY

## 2017-11-16 PROCEDURE — 86850 RBC ANTIBODY SCREEN: CPT | Performed by: OBSTETRICS & GYNECOLOGY

## 2017-11-16 PROCEDURE — 86901 BLOOD TYPING SEROLOGIC RH(D): CPT | Performed by: OBSTETRICS & GYNECOLOGY

## 2017-11-16 PROCEDURE — C1755 CATHETER, INTRASPINAL: HCPCS

## 2017-11-16 PROCEDURE — 25010000002 PROMETHAZINE PER 50 MG: Performed by: OBSTETRICS & GYNECOLOGY

## 2017-11-16 PROCEDURE — 84075 ASSAY ALKALINE PHOSPHATASE: CPT | Performed by: OBSTETRICS & GYNECOLOGY

## 2017-11-16 PROCEDURE — 84450 TRANSFERASE (AST) (SGOT): CPT | Performed by: OBSTETRICS & GYNECOLOGY

## 2017-11-16 PROCEDURE — 10907ZC DRAINAGE OF AMNIOTIC FLUID, THERAPEUTIC FROM PRODUCTS OF CONCEPTION, VIA NATURAL OR ARTIFICIAL OPENING: ICD-10-PCS | Performed by: OBSTETRICS & GYNECOLOGY

## 2017-11-16 PROCEDURE — 83615 LACTATE (LD) (LDH) ENZYME: CPT | Performed by: OBSTETRICS & GYNECOLOGY

## 2017-11-16 PROCEDURE — 84460 ALANINE AMINO (ALT) (SGPT): CPT | Performed by: OBSTETRICS & GYNECOLOGY

## 2017-11-16 PROCEDURE — 25010000002 FENTANYL CITRATE (PF) 100 MCG/2ML SOLUTION: Performed by: ANESTHESIOLOGY

## 2017-11-16 PROCEDURE — 85027 COMPLETE CBC AUTOMATED: CPT | Performed by: OBSTETRICS & GYNECOLOGY

## 2017-11-16 PROCEDURE — C1755 CATHETER, INTRASPINAL: HCPCS | Performed by: ANESTHESIOLOGY

## 2017-11-16 PROCEDURE — 88305 TISSUE EXAM BY PATHOLOGIST: CPT | Performed by: OBSTETRICS & GYNECOLOGY

## 2017-11-16 PROCEDURE — 25010000002 BUTORPHANOL PER 1 MG: Performed by: OBSTETRICS & GYNECOLOGY

## 2017-11-16 PROCEDURE — 86900 BLOOD TYPING SEROLOGIC ABO: CPT | Performed by: OBSTETRICS & GYNECOLOGY

## 2017-11-16 PROCEDURE — 59025 FETAL NON-STRESS TEST: CPT

## 2017-11-16 PROCEDURE — 82565 ASSAY OF CREATININE: CPT | Performed by: OBSTETRICS & GYNECOLOGY

## 2017-11-16 PROCEDURE — 0UBMXZZ EXCISION OF VULVA, EXTERNAL APPROACH: ICD-10-PCS | Performed by: OBSTETRICS & GYNECOLOGY

## 2017-11-16 PROCEDURE — 25010000002 ONDANSETRON PER 1 MG: Performed by: ANESTHESIOLOGY

## 2017-11-16 RX ORDER — FAMOTIDINE 10 MG/ML
20 INJECTION, SOLUTION INTRAVENOUS EVERY 12 HOURS SCHEDULED
Status: DISCONTINUED | OUTPATIENT
Start: 2017-11-16 | End: 2017-11-17 | Stop reason: HOSPADM

## 2017-11-16 RX ORDER — MAGNESIUM CARB/ALUMINUM HYDROX 105-160MG
30 TABLET,CHEWABLE ORAL ONCE
Status: DISCONTINUED | OUTPATIENT
Start: 2017-11-16 | End: 2017-11-17 | Stop reason: HOSPADM

## 2017-11-16 RX ORDER — FENTANYL CITRATE 50 UG/ML
INJECTION, SOLUTION INTRAMUSCULAR; INTRAVENOUS AS NEEDED
Status: DISCONTINUED | OUTPATIENT
Start: 2017-11-16 | End: 2017-11-16 | Stop reason: SURG

## 2017-11-16 RX ORDER — OXYTOCIN/RINGER'S LACTATE 20/1000 ML
999 PLASTIC BAG, INJECTION (ML) INTRAVENOUS ONCE
Status: DISCONTINUED | OUTPATIENT
Start: 2017-11-16 | End: 2017-11-17 | Stop reason: HOSPADM

## 2017-11-16 RX ORDER — DIPHENHYDRAMINE HYDROCHLORIDE 50 MG/ML
12.5 INJECTION INTRAMUSCULAR; INTRAVENOUS EVERY 8 HOURS PRN
Status: DISCONTINUED | OUTPATIENT
Start: 2017-11-16 | End: 2017-11-17 | Stop reason: HOSPADM

## 2017-11-16 RX ORDER — OXYTOCIN/RINGER'S LACTATE 30/500 ML
2-24 PLASTIC BAG, INJECTION (ML) INTRAVENOUS
Status: DISCONTINUED | OUTPATIENT
Start: 2017-11-17 | End: 2017-11-16

## 2017-11-16 RX ORDER — FAMOTIDINE 10 MG/ML
20 INJECTION, SOLUTION INTRAVENOUS ONCE AS NEEDED
Status: DISCONTINUED | OUTPATIENT
Start: 2017-11-16 | End: 2017-11-17 | Stop reason: HOSPADM

## 2017-11-16 RX ORDER — FAMOTIDINE 20 MG/1
20 TABLET, FILM COATED ORAL EVERY 12 HOURS SCHEDULED
Status: DISCONTINUED | OUTPATIENT
Start: 2017-11-16 | End: 2017-11-17 | Stop reason: HOSPADM

## 2017-11-16 RX ORDER — PROMETHAZINE HYDROCHLORIDE 25 MG/ML
12.5 INJECTION, SOLUTION INTRAMUSCULAR; INTRAVENOUS EVERY 6 HOURS PRN
Status: DISCONTINUED | OUTPATIENT
Start: 2017-11-16 | End: 2017-11-17 | Stop reason: HOSPADM

## 2017-11-16 RX ORDER — SODIUM CHLORIDE 0.9 % (FLUSH) 0.9 %
1-10 SYRINGE (ML) INJECTION AS NEEDED
Status: DISCONTINUED | OUTPATIENT
Start: 2017-11-16 | End: 2017-11-17 | Stop reason: HOSPADM

## 2017-11-16 RX ORDER — ACETAMINOPHEN 325 MG/1
650 TABLET ORAL EVERY 4 HOURS PRN
Status: DISCONTINUED | OUTPATIENT
Start: 2017-11-16 | End: 2017-11-17 | Stop reason: HOSPADM

## 2017-11-16 RX ORDER — TRISODIUM CITRATE DIHYDRATE AND CITRIC ACID MONOHYDRATE 500; 334 MG/5ML; MG/5ML
30 SOLUTION ORAL ONCE
Status: DISCONTINUED | OUTPATIENT
Start: 2017-11-16 | End: 2017-11-17 | Stop reason: HOSPADM

## 2017-11-16 RX ORDER — ONDANSETRON 2 MG/ML
4 INJECTION INTRAMUSCULAR; INTRAVENOUS ONCE AS NEEDED
Status: COMPLETED | OUTPATIENT
Start: 2017-11-16 | End: 2017-11-16

## 2017-11-16 RX ORDER — LIDOCAINE HYDROCHLORIDE AND EPINEPHRINE 15; 5 MG/ML; UG/ML
INJECTION, SOLUTION EPIDURAL AS NEEDED
Status: DISCONTINUED | OUTPATIENT
Start: 2017-11-16 | End: 2017-11-16 | Stop reason: SURG

## 2017-11-16 RX ORDER — TERBUTALINE SULFATE 1 MG/ML
0.25 INJECTION, SOLUTION SUBCUTANEOUS AS NEEDED
Status: DISCONTINUED | OUTPATIENT
Start: 2017-11-16 | End: 2017-11-17 | Stop reason: HOSPADM

## 2017-11-16 RX ORDER — PROMETHAZINE HYDROCHLORIDE 12.5 MG/1
12.5 TABLET ORAL EVERY 6 HOURS PRN
Status: DISCONTINUED | OUTPATIENT
Start: 2017-11-16 | End: 2017-11-17 | Stop reason: HOSPADM

## 2017-11-16 RX ORDER — LIDOCAINE HYDROCHLORIDE AND EPINEPHRINE 20; 5 MG/ML; UG/ML
INJECTION, SOLUTION EPIDURAL; INFILTRATION; INTRACAUDAL; PERINEURAL AS NEEDED
Status: DISCONTINUED | OUTPATIENT
Start: 2017-11-16 | End: 2017-11-16 | Stop reason: SURG

## 2017-11-16 RX ORDER — PROMETHAZINE HYDROCHLORIDE 12.5 MG/1
12.5 SUPPOSITORY RECTAL EVERY 6 HOURS PRN
Status: DISCONTINUED | OUTPATIENT
Start: 2017-11-16 | End: 2017-11-17 | Stop reason: HOSPADM

## 2017-11-16 RX ORDER — EPHEDRINE SULFATE/0.9% NACL/PF 50 MG/10ML
5 SYRINGE (ML) INTRAVENOUS
Status: DISCONTINUED | OUTPATIENT
Start: 2017-11-16 | End: 2017-11-17 | Stop reason: HOSPADM

## 2017-11-16 RX ORDER — ZOLPIDEM TARTRATE 5 MG/1
5 TABLET ORAL NIGHTLY PRN
Status: DISCONTINUED | OUTPATIENT
Start: 2017-11-16 | End: 2017-11-17 | Stop reason: HOSPADM

## 2017-11-16 RX ORDER — OXYTOCIN/RINGER'S LACTATE 30/500 ML
2-24 PLASTIC BAG, INJECTION (ML) INTRAVENOUS
Status: DISCONTINUED | OUTPATIENT
Start: 2017-11-16 | End: 2017-11-18 | Stop reason: HOSPADM

## 2017-11-16 RX ORDER — ACETAMINOPHEN AND CODEINE PHOSPHATE 300; 30 MG/1; MG/1
1 TABLET ORAL EVERY 4 HOURS PRN
COMMUNITY
End: 2017-11-18 | Stop reason: HOSPADM

## 2017-11-16 RX ORDER — LIDOCAINE HYDROCHLORIDE 10 MG/ML
5 INJECTION, SOLUTION INFILTRATION; PERINEURAL AS NEEDED
Status: DISCONTINUED | OUTPATIENT
Start: 2017-11-16 | End: 2017-11-17 | Stop reason: HOSPADM

## 2017-11-16 RX ORDER — SODIUM CHLORIDE, SODIUM LACTATE, POTASSIUM CHLORIDE, CALCIUM CHLORIDE 600; 310; 30; 20 MG/100ML; MG/100ML; MG/100ML; MG/100ML
125 INJECTION, SOLUTION INTRAVENOUS CONTINUOUS
Status: DISCONTINUED | OUTPATIENT
Start: 2017-11-16 | End: 2017-11-18 | Stop reason: HOSPADM

## 2017-11-16 RX ORDER — METOCLOPRAMIDE HYDROCHLORIDE 5 MG/ML
10 INJECTION INTRAMUSCULAR; INTRAVENOUS ONCE AS NEEDED
Status: DISCONTINUED | OUTPATIENT
Start: 2017-11-16 | End: 2017-11-17 | Stop reason: HOSPADM

## 2017-11-16 RX ORDER — OXYCODONE HYDROCHLORIDE AND ACETAMINOPHEN 5; 325 MG/1; MG/1
1 TABLET ORAL EVERY 4 HOURS PRN
Status: DISCONTINUED | OUTPATIENT
Start: 2017-11-16 | End: 2017-11-17 | Stop reason: HOSPADM

## 2017-11-16 RX ORDER — OXYTOCIN/RINGER'S LACTATE 20/1000 ML
125 PLASTIC BAG, INJECTION (ML) INTRAVENOUS CONTINUOUS PRN
Status: DISCONTINUED | OUTPATIENT
Start: 2017-11-16 | End: 2017-11-17 | Stop reason: HOSPADM

## 2017-11-16 RX ADMIN — ROPIVACAINE HYDROCHLORIDE 16 ML/HR: 5 INJECTION, SOLUTION EPIDURAL; INFILTRATION; PERINEURAL at 18:19

## 2017-11-16 RX ADMIN — ONDANSETRON 4 MG: 2 INJECTION INTRAMUSCULAR; INTRAVENOUS at 20:47

## 2017-11-16 RX ADMIN — LIDOCAINE HYDROCHLORIDE,EPINEPHRINE BITARTRATE 7 ML: 20; .005 INJECTION, SOLUTION EPIDURAL; INFILTRATION; INTRACAUDAL; PERINEURAL at 22:06

## 2017-11-16 RX ADMIN — BUTORPHANOL TARTRATE 2 MG: 2 INJECTION, SOLUTION INTRAMUSCULAR; INTRAVENOUS at 14:11

## 2017-11-16 RX ADMIN — LIDOCAINE HYDROCHLORIDE AND EPINEPHRINE 4 ML: 15; 5 INJECTION, SOLUTION EPIDURAL at 18:12

## 2017-11-16 RX ADMIN — PROMETHAZINE HYDROCHLORIDE 12.5 MG: 25 INJECTION INTRAMUSCULAR; INTRAVENOUS at 11:13

## 2017-11-16 RX ADMIN — SODIUM CHLORIDE, POTASSIUM CHLORIDE, SODIUM LACTATE AND CALCIUM CHLORIDE 125 ML/HR: 600; 310; 30; 20 INJECTION, SOLUTION INTRAVENOUS at 08:00

## 2017-11-16 RX ADMIN — SODIUM CHLORIDE, POTASSIUM CHLORIDE, SODIUM LACTATE AND CALCIUM CHLORIDE 1000 ML: 600; 310; 30; 20 INJECTION, SOLUTION INTRAVENOUS at 19:49

## 2017-11-16 RX ADMIN — OXYCODONE AND ACETAMINOPHEN 1 TABLET: 5; 325 TABLET ORAL at 23:25

## 2017-11-16 RX ADMIN — BUTORPHANOL TARTRATE 2 MG: 2 INJECTION, SOLUTION INTRAMUSCULAR; INTRAVENOUS at 16:28

## 2017-11-16 RX ADMIN — ROPIVACAINE HYDROCHLORIDE 10 ML: 5 INJECTION, SOLUTION EPIDURAL; INFILTRATION; PERINEURAL at 18:07

## 2017-11-16 RX ADMIN — OXYTOCIN 2 MILLI-UNITS/MIN: 10 INJECTION INTRAVENOUS at 09:11

## 2017-11-16 RX ADMIN — BUTORPHANOL TARTRATE 2 MG: 2 INJECTION, SOLUTION INTRAMUSCULAR; INTRAVENOUS at 11:13

## 2017-11-16 RX ADMIN — LIDOCAINE HYDROCHLORIDE AND EPINEPHRINE 5 ML: 15; 5 INJECTION, SOLUTION EPIDURAL at 18:11

## 2017-11-16 RX ADMIN — OXYTOCIN 125 ML/HR: 10 INJECTION INTRAVENOUS at 22:50

## 2017-11-16 RX ADMIN — SODIUM CHLORIDE, POTASSIUM CHLORIDE, SODIUM LACTATE AND CALCIUM CHLORIDE 1000 ML: 600; 310; 30; 20 INJECTION, SOLUTION INTRAVENOUS at 17:45

## 2017-11-16 RX ADMIN — BUTORPHANOL TARTRATE 2 MG: 2 INJECTION, SOLUTION INTRAMUSCULAR; INTRAVENOUS at 09:16

## 2017-11-16 RX ADMIN — OXYTOCIN 2000 ML/HR: 10 INJECTION INTRAVENOUS at 22:32

## 2017-11-16 RX ADMIN — FENTANYL CITRATE 100 MCG: 50 INJECTION, SOLUTION INTRAMUSCULAR; INTRAVENOUS at 18:19

## 2017-11-16 NOTE — PLAN OF CARE
Problem: Labor (Cervical Ripen, Induct, Augment) (Adult,Obstetrics,Pediatric)  Goal: Signs and Symptoms of Listed Potential Problems Will be Absent or Manageable (Labor)  Outcome: Ongoing (interventions implemented as appropriate)    11/16/17 1359   Labor (Cervical Ripen, Induct, Augment)   Problems Assessed (Labor) all   Problems Present (Labor) none

## 2017-11-16 NOTE — H&P
M[facility]  Obstetric History and Physical    Chief Complaint   Patient presents with   • Scheduled Induction       Subjective     Patient is a 32 y.o. female  currently at 38w2d, who presents with pregnancy C at 38-1/2 weeks for induction..  Has hypertension and gestational diabetes.    Her prenatal care is benign.  Her previous obstetric/gynecological history is noted for is non-contributory.    The following portions of the patients history were reviewed and updated as appropriate: current medications .       Prenatal Information:  Prenatal Results         Initial Prenatal Labs Ref. Range Date Time   Hemoglobin       Hematocrit       Platelets  299 10*3/mm3 150 - 450 10*3/mm3 17 1307   Rubella IgG       Hepatitis B SAg       Hepatitis C Ab       RPR       ABO       Rh       Antibody Screen       HIV       Urine Culture  30,000-40,000 CFU/mL Normal Urogenital Lela   16 0914   Gonorrhea       Chlamydia       TSH  1.113 mIU/mL 0.350 - 5.350 mIU/mL 17 1350   2nd and 3rd Trimester Ref. Range Date Time   Hemoglobin (repeated)  12.1 g/dL 11.5 - 15.5 g/dL 17 1307   Hematocrit (repeated)  35.6 % 34.5 - 44.0 % 17 1307   GCT       Antibody Screen (repeated)       GTT Fasting       GTT 1 Hr       GTT 2 Hr       GTT 3 Hr       Group B Strep  No Group B Streptococcus Isolated from Broth Culture   10/30/17 1812   Drug Screening Ref. Range Date Time   Amphetamine Screen       Barbiturate Screen       Benzodiazepine Screen       Methadone Screen       Phencyclidine Screen       Opiates Screen       THC Screen       Cocaine Screen       Propoxyphene Screen       Buprenorphine Screen       Methamphetamine Screen       Oxycodone Screen       Tryicyclic Antidepressants Screen       Other (Risk screening) Ref. Range Date Time   Varicella IgG       Parvovirus IgG       CMV IgG       Cystic Fibrosis       Hemoglobin electrophoresis       NIPT       MSAFP-4       AFP (for NTD only)               Legend: ^: Historical            View all results for this pregnancy             Past OB History:     Obstetric History       T1      L1     SAB0   TAB0   Ectopic0   Multiple0   Live Births1       # Outcome Date GA Lbr Vincent/2nd Weight Sex Delivery Anes PTL Lv   2 Current            1 Term 14 38w0d  7 lb 3 oz (3260 g) F Vag-Spont  N LINNETTE      Complications: Gestational diabetes,Gestational hypertension,Status post induction of labor          Past Medical History: Past Medical History:   Diagnosis Date   • Anxiety     PCP manages   • Anxiety    • Blood in urine    • Chills    • Chronic headaches    • Fatigue    • Gestational diabetes 2017    took oral meds in  pregnancy; recently dx in 2017 pregnancy    • Kidney stones    • Mood changes    • Nausea    • Night sweats    • Obesity, Class III, BMI 40-49.9 (morbid obesity)    • Weight gain       Past Surgical History Past Surgical History:   Procedure Laterality Date   • CYSTOSCOPY BLADDER STONE LITHOTRIPSY  2017    reports has 2 remaining stones   • WISDOM TOOTH EXTRACTION     • WISDOM TOOTH EXTRACTION        Family History: Family History   Problem Relation Age of Onset   • Hypertension Father    • Diabetes Mother    • Cancer Maternal Grandmother    • Diabetes Brother       Social History:  reports that she has quit smoking. Her smoking use included Cigarettes. She smoked 0.25 packs per day. She has never used smokeless tobacco.   reports that she does not drink alcohol.   reports that she does not use illicit drugs.        General ROS: Pertinent items are noted in HPI    Objective       Vital Signs Range for the last 24 hours  Temperature: Temp:  [98.2 °F (36.8 °C)] 98.2 °F (36.8 °C)   Temp Source: Temp src: Oral   BP: BP: (140)/(77) 140/77   Pulse: Heart Rate:  [104] 104   Respirations: Resp:  [16] 16   SPO2:     O2 Amount (l/min):     O2 Devices     Weight: Weight:  [132 kg (290 lb)] 132 kg (290 lb)     Physical Examination: General  appearance - alert, well appearing, and in no distress    Presentation: vtx   Cervix: Exam by: Method: sterile exam per physician   Dilation: Dilation: 2   Effacement: Cervical Effacement: 60%   Station: Station: -1       Fetal Heart Rate Assessment   Method:     Beats/min: Fetal HR (Beats/Min): 142   Baseline: Fetal HR Baseline: normal range (110-160 bpm)   Varibility:     Accels:     Decels:     Tracing Category:       Uterine Assessment   Method: Method: none, per patient report   Frequency (min):     Ctx Count in 10 min:     Duration:     Intensity:     Intensity by IUPC:     Resting Tone:     Resting Tone by IUPC:     Perth Units:       Laboratory Results:   Radiology Review:   Other Studies:     Assessment/Plan     Active Problems:    * No active hospital problems. *        Assessment:  1.  Intrauterine pregnancy at 38w2d weeks gestation with reactive fetal status.    2.  Hypertension and gestational diabetes.  3.  Obstetrical history significant for is non-contributory.  4.  GBS status: No results found for: GBSANTIGEN    Plan:  1.  Induced with Hansen bulb catheter and Pitocin.  2. Plan of care has been reviewed with patient and family  3.  Risks, benefits of treatment plan have been discussed.  4.  All questions have been answered.  5.        Hanna Kirby MD  11/16/2017  8:39 AM

## 2017-11-16 NOTE — PROCEDURES
Transcervical jones placed per physician request.  FHT's class 1.  Patient tolerated well. 24 Maldivian, 60ml.    Jones Fletcher MD  11/16/2017  8:08 AM

## 2017-11-16 NOTE — ANESTHESIA PREPROCEDURE EVALUATION
Anesthesia Evaluation     Patient summary reviewed   no history of anesthetic complications:         Airway   Mallampati: II  Neck ROM: full  possible difficult intubation  Dental - normal exam     Pulmonary - normal exam   (+) a smoker Current,   (-) asthma, sleep apnea  Cardiovascular - negative cardio ROS and normal exam        Neuro/Psych  (+) psychiatric history Depression and Anxiety,    (-) seizures, neuromuscular disease, TIA, CVA, headaches, dizziness/light headedness, syncope, weakness, numbness  GI/Hepatic/Renal/Endo    (+) obesity, morbid obesity, GERD poorly controlled, renal disease stones, diabetes mellitus gestational,   (-) hepatitis, hypothyroidism, hyperthyroidism    Musculoskeletal (-) negative ROS    Abdominal   (+) obese,    Substance History   (-) drug use     OB/GYN    (+) Pregnant,         Other        (-) blood dyscrasia                                  Anesthesia Plan    ASA 3     epidural     Anesthetic plan and risks discussed with patient.

## 2017-11-17 LAB
BASOPHILS # BLD AUTO: 0.02 10*3/MM3 (ref 0–0.2)
BASOPHILS NFR BLD AUTO: 0.1 % (ref 0–1)
DEPRECATED RDW RBC AUTO: 45.8 FL (ref 37–54)
EOSINOPHIL # BLD AUTO: 0.03 10*3/MM3 (ref 0–0.3)
EOSINOPHIL NFR BLD AUTO: 0.2 % (ref 0–3)
ERYTHROCYTE [DISTWIDTH] IN BLOOD BY AUTOMATED COUNT: 14.6 % (ref 11.3–14.5)
HCT VFR BLD AUTO: 31.3 % (ref 34.5–44)
HGB BLD-MCNC: 10.3 G/DL (ref 11.5–15.5)
IMM GRANULOCYTES # BLD: 0.07 10*3/MM3 (ref 0–0.03)
IMM GRANULOCYTES NFR BLD: 0.4 % (ref 0–0.6)
LYMPHOCYTES # BLD AUTO: 2.04 10*3/MM3 (ref 0.6–4.8)
LYMPHOCYTES NFR BLD AUTO: 12.2 % (ref 24–44)
MCH RBC QN AUTO: 28.5 PG (ref 27–31)
MCHC RBC AUTO-ENTMCNC: 32.9 G/DL (ref 32–36)
MCV RBC AUTO: 86.5 FL (ref 80–99)
MONOCYTES # BLD AUTO: 1.09 10*3/MM3 (ref 0–1)
MONOCYTES NFR BLD AUTO: 6.5 % (ref 0–12)
NEUTROPHILS # BLD AUTO: 13.47 10*3/MM3 (ref 1.5–8.3)
NEUTROPHILS NFR BLD AUTO: 80.6 % (ref 41–71)
PLATELET # BLD AUTO: 272 10*3/MM3 (ref 150–450)
PMV BLD AUTO: 10.2 FL (ref 6–12)
RBC # BLD AUTO: 3.62 10*6/MM3 (ref 3.89–5.14)
WBC NRBC COR # BLD: 16.72 10*3/MM3 (ref 3.5–10.8)

## 2017-11-17 PROCEDURE — 85025 COMPLETE CBC W/AUTO DIFF WBC: CPT | Performed by: OBSTETRICS & GYNECOLOGY

## 2017-11-17 PROCEDURE — 63710000001 PROMETHAZINE PER 25 MG: Performed by: OBSTETRICS & GYNECOLOGY

## 2017-11-17 RX ORDER — BISACODYL 10 MG
10 SUPPOSITORY, RECTAL RECTAL DAILY PRN
Status: DISCONTINUED | OUTPATIENT
Start: 2017-11-17 | End: 2017-11-18 | Stop reason: HOSPADM

## 2017-11-17 RX ORDER — DOCUSATE SODIUM 100 MG/1
100 CAPSULE, LIQUID FILLED ORAL 2 TIMES DAILY
Status: DISCONTINUED | OUTPATIENT
Start: 2017-11-17 | End: 2017-11-18 | Stop reason: HOSPADM

## 2017-11-17 RX ORDER — LANOLIN 100 %
OINTMENT (GRAM) TOPICAL AS NEEDED
Status: DISCONTINUED | OUTPATIENT
Start: 2017-11-17 | End: 2017-11-18 | Stop reason: HOSPADM

## 2017-11-17 RX ORDER — OXYCODONE AND ACETAMINOPHEN 7.5; 325 MG/1; MG/1
1 TABLET ORAL EVERY 4 HOURS PRN
Status: DISCONTINUED | OUTPATIENT
Start: 2017-11-17 | End: 2017-11-18 | Stop reason: HOSPADM

## 2017-11-17 RX ORDER — SERTRALINE HYDROCHLORIDE 100 MG/1
100 TABLET, FILM COATED ORAL DAILY
Status: DISCONTINUED | OUTPATIENT
Start: 2017-11-17 | End: 2017-11-18 | Stop reason: HOSPADM

## 2017-11-17 RX ORDER — PROMETHAZINE HYDROCHLORIDE 25 MG/1
25 TABLET ORAL EVERY 6 HOURS PRN
Status: DISCONTINUED | OUTPATIENT
Start: 2017-11-17 | End: 2017-11-18 | Stop reason: HOSPADM

## 2017-11-17 RX ORDER — ZOLPIDEM TARTRATE 5 MG/1
5 TABLET ORAL NIGHTLY PRN
Status: DISCONTINUED | OUTPATIENT
Start: 2017-11-17 | End: 2017-11-18 | Stop reason: HOSPADM

## 2017-11-17 RX ADMIN — OXYCODONE HYDROCHLORIDE AND ACETAMINOPHEN 1 TABLET: 7.5; 325 TABLET ORAL at 05:03

## 2017-11-17 RX ADMIN — HYDROCORTISONE 2.5% 1 APPLICATION: 25 CREAM TOPICAL at 01:00

## 2017-11-17 RX ADMIN — Medication: at 01:04

## 2017-11-17 RX ADMIN — PROMETHAZINE HYDROCHLORIDE 25 MG: 25 TABLET ORAL at 02:33

## 2017-11-17 RX ADMIN — WITCH HAZEL 1 PAD: 500 SOLUTION RECTAL; TOPICAL at 01:00

## 2017-11-17 RX ADMIN — OXYCODONE HYDROCHLORIDE AND ACETAMINOPHEN 1 TABLET: 7.5; 325 TABLET ORAL at 01:00

## 2017-11-17 RX ADMIN — BENZOCAINE AND MENTHOL: 20; .5 SPRAY TOPICAL at 01:00

## 2017-11-17 RX ADMIN — OXYCODONE HYDROCHLORIDE AND ACETAMINOPHEN 1 TABLET: 7.5; 325 TABLET ORAL at 19:52

## 2017-11-17 RX ADMIN — OXYCODONE HYDROCHLORIDE AND ACETAMINOPHEN 1 TABLET: 7.5; 325 TABLET ORAL at 09:12

## 2017-11-17 RX ADMIN — DOCUSATE SODIUM 100 MG: 100 CAPSULE, LIQUID FILLED ORAL at 08:00

## 2017-11-17 RX ADMIN — OXYCODONE HYDROCHLORIDE AND ACETAMINOPHEN 1 TABLET: 7.5; 325 TABLET ORAL at 12:58

## 2017-11-17 RX ADMIN — SERTRALINE HYDROCHLORIDE 100 MG: 100 TABLET ORAL at 08:00

## 2017-11-17 RX ADMIN — MEASLES, MUMPS, AND RUBELLA VIRUS VACCINE LIVE 0.5 ML: 1000; 12500; 1000 INJECTION, POWDER, LYOPHILIZED, FOR SUSPENSION SUBCUTANEOUS at 00:40

## 2017-11-17 RX ADMIN — DOCUSATE SODIUM 100 MG: 100 CAPSULE, LIQUID FILLED ORAL at 17:32

## 2017-11-17 NOTE — L&D DELIVERY NOTE
[facility]  Vaginal Delivery Note    Delivery     Delivery: Vaginal, Spontaneous Delivery     YOB: 2018    Time of Birth: 10:09 PM      Anesthesia: Epidural     Delivering clinician: Hanna Kirby    Forceps?   No   Vacuum? No    Shoulder dystocia present: No        Delivery narrative:  Patient delivered after 2 hours of pushing by way of spontaneous vaginal delivery.  The baby was in the occiput posterior position.  Placenta was expressed uterus was explored there were no tears.  She had a small lesion on her right perineum about 1.5 cm bleeding that was removed using a scalpel blade and stitching with 3-0 Vicryl subcuticular stitch.    Infant    Findings: female  infant     Infant observations: Weight: 8 lb 15.7 oz (4075 g)   Length: 21  in  Observations/Comments:         Apgars: 8   @ 1 minute /    9   @ 5 minutes   Infant Name:      Placenta, Cord, and Fluid    Placenta delivered  Spontaneous  at   11/16 10:33 PM     Cord: 3 vessels  present.   Nuchal Cord?  no   Cord blood obtained: Yes    Cord gases obtained:  No    Cord gas results: Venous:  No results found for: PHCVEN    Arterial:  No results found for: PHCART     Repair    Episiotomy: None    Lacerations: No   Estimated Blood Loss: Est. Blood Loss (mL): 400 mL (Filed from Delivery Summary) (11/16/17 3153)     Suture used for repair:   and       Complications  none    Disposition  Mother to Mother Baby/Postpartum  in stable condition currently.  Baby to NBN  in stable condition currently.      Hanna Kirby MD  11/16/17  10:49 PM    [facility]  Vaginal Delivery Note    Delivery     Delivery: Vaginal, Spontaneous Delivery     YOB: 2018    Time of Birth: 10:09 PM      Anesthesia: Epidural     Delivering clinician: Hanna Kirby    Forceps?      Vacuum?     Shoulder dystocia present:        Delivery narrative:      Infant    Findings: female  infant     Infant observations: Weight: 8 lb 15.7 oz (4075 g)   Length: 21   in  Observations/Comments:         Apgars: 8   @ 1 minute /    9   @ 5 minutes   Infant Name:      Placenta, Cord, and Fluid    Placenta delivered  Spontaneous  at   11/16 10:33 PM     Cord: 3 vessels  present.   Nuchal Cord?     Cord blood obtained: Yes    Cord gases obtained:  No    Cord gas results: Venous:  No results found for: PHCVEN    Arterial:  No results found for: PHCART     Repair    Episiotomy: None    Lacerations:    Estimated Blood Loss: Est. Blood Loss (mL): 400 mL (Filed from Delivery Summary) (11/16/17 6359)     Suture used for repair:   and       Complications      Disposition  Mother to   in  condition currently.  Baby to   in  condition currently.      Hanna iKrby MD  11/16/17  10:49 PM

## 2017-11-17 NOTE — ANESTHESIA POSTPROCEDURE EVALUATION
Patient: Souleymane Grossman    Procedure Summary     Date Anesthesia Start Anesthesia Stop Room / Location    11/16/17 [id number]        Procedure Diagnosis Scheduled Providers Provider    LABOR ANALGESIA No diagnosis on file.  Leobardo Jackson MD          Anesthesia Type: epidural  Last vitals  BP   121/66 (11/17/17 0700)   Temp   98.4 °F (36.9 °C) (11/17/17 0700)   Pulse   95 (11/17/17 0700)   Resp   16 (11/17/17 0700)     SpO2         Post Anesthesia Care and Evaluation    Patient location during evaluation: bedside  Patient participation: complete - patient participated  Level of consciousness: awake and alert  Pain management: adequate  Airway patency: patent  Anesthetic complications: No anesthetic complications    Cardiovascular status: acceptable  Respiratory status: acceptable  Hydration status: acceptable  Post Neuraxial Block status: Motor and sensory function returned to baseline and No signs or symptoms of PDPH

## 2017-11-17 NOTE — PLAN OF CARE
Problem: Patient Care Overview (Adult)  Goal: Plan of Care Review  Outcome: Ongoing (interventions implemented as appropriate)    11/17/17 1142   Coping/Psychosocial Response Interventions   Plan Of Care Reviewed With patient;significant other;friend   Patient Care Overview   Progress progress toward functional goals as expected   Outcome Evaluation   Outcome Summary/Follow up Plan Baby nursed well on both breasts. Continue to feed on demand and bring baby close to help with nipple tenderness. Gave gels for nipple relief.

## 2017-11-17 NOTE — LACTATION NOTE
"   11/17/17 1015   Maternal Information   Person Making Referral patient   Maternal Infant Assessment   Size Issue, Bilateral Breasts no   Shape, Bilateral Breasts pendulous;round   Density, Bilateral Breasts soft   Nipples, Bilateral everted   Nipple Conditions, Bilateral erythema;tender   Additional Documentation (Latch) LATCH Score (Group)   LATCH Score   Latch 2-->grasps breast, tongue down, lips flanged, rhythmic sucking   Audible Swallowing 1-->a few with stimulation   Type Of Nipple 2-->everted (after stimulation)   Comfort (Breast/Nipple) 1-->filling, red/small blisters/bruises, mild/mod discomfort   Hold (Positioning) 1-->minimal assist, teach one side: mother does other, staff holds   Score (less than 7 for 2/more consecutive times, consult Lactation Consultant) 7   Maternal Infant Feeding   Previous Breastfeeding History yes   Infant Positioning clutch/\"football\";cradle;cross-cradle   Signs of Milk Transfer audible swallow   Presence of Pain no   Nipple Shape After Feeding, Left Breast symmetrical   Nipple Shape After Feeding, Right Breast symmetrical   Latch Assistance yes   Feeding Infant   Feeding Readiness Cues rooting   Satiety Cues cessation of sucking   Disengagement Cues sleepy   Effective Latch During Feeding yes   Audible Swallow yes   Suck/Swallow Coordination present   Skin-to-Skin Contact During Feeding yes     "

## 2017-11-17 NOTE — PROGRESS NOTES
11/17/2017  PPD #1    Subjective   Souleymane feels well.  Patient describes her lochia less than menses.  Pain is well controlled       Objective   Temp: Temp:  [97.5 °F (36.4 °C)-98.8 °F (37.1 °C)] 98.4 °F (36.9 °C) Temp src: Oral   BP: BP: (106-154)/(52-82) 121/66        Pulse: Heart Rate:  [] 95  RR: Resp:  [16-20] 16    General:  No acute distress   Abdomen: Fundus firm and beneath umbilicus   Pelvis: deferred     Lab Results   Component Value Date    WBC 16.72 (H) 11/17/2017    HGB 10.3 (L) 11/17/2017    HCT 31.3 (L) 11/17/2017    MCV 86.5 11/17/2017     11/17/2017       Assessment  1. PPD# 1 after vaginal delivery    Plan  1. Routine postpartum care.      This note has been electronically signed.    Oneida Cesar, APRN  November 17, 2017

## 2017-11-18 VITALS
BODY MASS INDEX: 49.51 KG/M2 | HEART RATE: 86 BPM | SYSTOLIC BLOOD PRESSURE: 146 MMHG | HEIGHT: 64 IN | DIASTOLIC BLOOD PRESSURE: 66 MMHG | RESPIRATION RATE: 16 BRPM | WEIGHT: 290 LBS | TEMPERATURE: 98.8 F

## 2017-11-18 PROBLEM — E66.01 MORBID OBESITY DUE TO EXCESS CALORIES (HCC): Status: RESOLVED | Noted: 2017-09-21 | Resolved: 2017-11-18

## 2017-11-18 PROBLEM — O24.419 GDM, CLASS A2: Status: RESOLVED | Noted: 2017-09-21 | Resolved: 2017-11-18

## 2017-11-18 PROBLEM — Z34.90 CURRENTLY PREGNANT: Status: RESOLVED | Noted: 2017-11-16 | Resolved: 2017-11-18

## 2017-11-18 RX ORDER — OXYCODONE HYDROCHLORIDE AND ACETAMINOPHEN 5; 325 MG/1; MG/1
1-2 TABLET ORAL EVERY 4 HOURS PRN
Qty: 12 TABLET | Refills: 0 | Status: SHIPPED | OUTPATIENT
Start: 2017-11-18 | End: 2017-11-21

## 2017-11-18 RX ADMIN — OXYCODONE HYDROCHLORIDE AND ACETAMINOPHEN 1 TABLET: 7.5; 325 TABLET ORAL at 09:47

## 2017-11-18 RX ADMIN — OXYCODONE HYDROCHLORIDE AND ACETAMINOPHEN 1 TABLET: 7.5; 325 TABLET ORAL at 05:43

## 2017-11-18 RX ADMIN — DOCUSATE SODIUM 100 MG: 100 CAPSULE, LIQUID FILLED ORAL at 09:08

## 2017-11-18 RX ADMIN — SERTRALINE HYDROCHLORIDE 100 MG: 100 TABLET ORAL at 09:08

## 2017-11-18 RX ADMIN — WITCH HAZEL 1 PAD: 500 SOLUTION RECTAL; TOPICAL at 09:08

## 2017-11-18 RX ADMIN — OXYCODONE HYDROCHLORIDE AND ACETAMINOPHEN 1 TABLET: 7.5; 325 TABLET ORAL at 01:22

## 2017-11-18 NOTE — DISCHARGE SUMMARY
Discharge Summary    Date of Admission: 2017  Date of Discharge:  2017      Patient: Souleymane Grossman      MR#:[id number]    Delivery Provider: Hanna Kirby     Presenting Problem/History of Present Illness  Currently pregnant [Z34.90]     Patient Active Problem List   Diagnosis   (none) - all problems resolved or deleted         Discharge Diagnosis: Vaginal delivery at 38w2d    Procedures:  Vaginal, Spontaneous Delivery     2017    10:09 PM        Discharge Date: 2017;     Hospital Course  Patient is a 32 y.o. female  at 38w2d status post vaginal delivery without complication. Postpartum the patient did well. She remained afebrile, with vital signs stable. She was ready for discharge on postpartum day 2.     Infant:   female  fetus 8 lb 15.7 oz (4.075 kg)  with Apgar scores of 8  , 9   at five minutes.    Condition on Discharge:  Stable    Vital Signs  Temp:  [97.9 °F (36.6 °C)-98.8 °F (37.1 °C)] 98.8 °F (37.1 °C)  Heart Rate:  [] 86  Resp:  [16-18] 16  BP: (115-154)/(65-84) 146/66    Lab Results   Component Value Date    WBC 16.72 (H) 2017    HGB 10.3 (L) 2017    HCT 31.3 (L) 2017    MCV 86.5 2017     2017       Discharge Disposition  Home or Self Care    Discharge Medications   Souleymane Grossman   Home Medication Instructions LESA:[id number]    Printed on:17 5714   Medication Information                      Lancets (FREESTYLE) lancets               oxyCODONE-acetaminophen (PERCOCET) 5-325 MG per tablet  Take 1-2 tablets by mouth Every 4 (Four) Hours As Needed for Moderate Pain  for up to 3 days.             Prenatal Vit-Fe Fumarate-FA (PRENATAL VITAMIN PO)  Take 1 tablet by mouth Daily.             sertraline (ZOLOFT) 100 MG tablet  Take 100 mg by mouth Daily. Recently restarted @ 50 mg daily.                 Discharge Diet:     Activity at Discharge:   Activity Instructions     Pelvic Rest                     Follow-up  Appointments  No future appointments.  Additional Instructions for the Follow-ups that You Need to Schedule     Call MD for problems / concerns.    As directed        Discharge Follow-up with Specified Provider:    As directed    Follow Up Details:  6 weeks with mariana       Discharge Follow-up with Specified Provider: dr peña; 6 Weeks    As directed    To:  dr peña   Follow Up:  6 Weeks                 KISHORE Keenan  11/18/17  8:30 AM  Csd

## 2017-11-20 LAB
CYTO UR: NORMAL
LAB AP CASE REPORT: NORMAL
LAB AP CLINICAL INFORMATION: NORMAL
Lab: NORMAL
PATH REPORT.FINAL DX SPEC: NORMAL
PATH REPORT.GROSS SPEC: NORMAL

## 2018-07-10 ENCOUNTER — LAB (OUTPATIENT)
Dept: LAB | Facility: HOSPITAL | Age: 33
End: 2018-07-10

## 2018-07-10 ENCOUNTER — TRANSCRIBE ORDERS (OUTPATIENT)
Dept: LAB | Facility: HOSPITAL | Age: 33
End: 2018-07-10

## 2018-07-10 DIAGNOSIS — Z79.899 POLYPHARMACY: ICD-10-CM

## 2018-07-10 DIAGNOSIS — Z79.899 POLYPHARMACY: Primary | ICD-10-CM

## 2018-07-10 LAB
AMPHET+METHAMPHET UR QL: NEGATIVE
AMPHETAMINES UR QL: NEGATIVE
BARBITURATES UR QL SCN: NEGATIVE
BENZODIAZ UR QL SCN: POSITIVE
BUPRENORPHINE SERPL-MCNC: NEGATIVE NG/ML
CANNABINOIDS SERPL QL: NEGATIVE
COCAINE UR QL: NEGATIVE
METHADONE UR QL SCN: NEGATIVE
OPIATES UR QL: NEGATIVE
OXYCODONE UR QL SCN: NEGATIVE
PCP UR QL SCN: NEGATIVE
PROPOXYPH UR QL: NEGATIVE
TRICYCLICS UR QL SCN: NEGATIVE

## 2018-07-10 PROCEDURE — 80306 DRUG TEST PRSMV INSTRMNT: CPT

## 2020-01-12 NOTE — PROGRESS NOTES
"Adult New Patient Visit:  Patient Care Team:  Alejandrina Ludwig MD as PCP - General (Internal Medicine)    Chief Complaint   Patient presents with   • [facility] Care       Souleymane Grossman is a 34 y.o. female who presents to [facility]. Previous PCP was Dr. Donato.    HPI Issues discussed today:    1. Left Ear infection:  Notably did have sinus infection/bronchitis per [facility], treated with doxy and prednisone 12/12/19. Then went to [facility] ([facility]) on 1/10 for recurrent sx and got Rx'd prednisone x 5d, 10d of cefdinir.  She states her left ear pain has resolved.  She still has a cough with yellow phlegm at times.  Occasionally she feels chest tightness.  She denies chest pain, shortness of breath, wheezing.  No new fevers.    2. Anxiety/ADHD:  Sees psych (Dr. Saranya Bradford, [facility]) who manages Adderall, clonidine and Xanax.  Patient inquiring as to whether I would take over Rx.    3. Tobacco Use:  1 PPD. Has chantix which she intends to start because she wants to quit. Psych has ok'd her to try this.     4. Elevated BP without dx HTN:  Initial /96 today.  On repeat improved.  Patient states she has nervousness and a new doctor's office.    She would like labs to screen for \"vitamin and mineral deficiency.\"    Review of Systems   Constitutional: Negative for activity change, appetite change and fever.   HENT: Positive for congestion. Negative for sneezing and sore throat.    Eyes: Negative for discharge and visual disturbance.   Respiratory: Positive for cough and chest tightness. Negative for shortness of breath.    Cardiovascular: Negative for chest pain and palpitations.   Gastrointestinal: Negative for abdominal distention, abdominal pain, blood in stool, constipation, nausea and vomiting.   Endocrine: Negative for cold intolerance and heat intolerance.   Genitourinary: Negative for dysuria.   Musculoskeletal: Negative for neck stiffness.   Skin: Negative for rash.   Allergic/Immunologic: " Negative for environmental allergies and food allergies.   Neurological: Negative for headache.   Hematological: Negative for adenopathy.   Psychiatric/Behavioral: Negative for depressed mood.        History  Past Medical History:   Diagnosis Date   • ADHD (attention deficit hyperactivity disorder)    • Anxiety     PCP manages   • Anxiety    • Blood in urine    • Chills    • Chronic headaches    • Fatigue    • Gestational diabetes 2017    took oral meds in 2014 pregnancy; recently dx in 2017 pregnancy    • Kidney stones    • Low back pain    • Mood changes    • Nausea    • Night sweats    • Obesity, Class III, BMI 40-49.9 (morbid obesity) (CMS/HCC)    • Weight gain       Past Surgical History:   Procedure Laterality Date   • CYSTOSCOPY BLADDER STONE LITHOTRIPSY  02/2017    reports has 2 remaining stones   • WISDOM TOOTH EXTRACTION     • WISDOM TOOTH EXTRACTION        Allergies   Allergen Reactions   • Bactrim [Sulfamethoxazole-Trimethoprim] Rash      Family History   Problem Relation Age of Onset   • Hypertension Father    • Anxiety disorder Father    • Arthritis Father    • Mental illness Father    • Diabetes Mother    • Anxiety disorder Mother    • Arthritis Mother    • Cancer Mother    • Early death Mother    • Cancer Maternal Grandmother    • Asthma Maternal Grandmother    • Diabetes Brother    • Vision loss Brother    • Asthma Maternal Grandfather       Social History     Socioeconomic History   • Marital status:      Spouse name: Not on file   • Number of children: Not on file   • Years of education: Not on file   • Highest education level: Not on file   Tobacco Use   • Smoking status: Current Every Day Smoker     Packs/day: 1.00     Types: Cigarettes   • Smokeless tobacco: Never Used   Substance and Sexual Activity   • Alcohol use: Yes     Alcohol/week: 2.0 standard drinks     Types: 2 Glasses of wine per week     Comment: OCCASIONAL   • Drug use: No   • Sexual activity: Defer        Current Outpatient  "Medications:   •  ALPRAZolam (XANAX) 1 MG tablet, , Disp: , Rfl:   •  amphetamine-dextroamphetamine (ADDERALL) 20 MG tablet, , Disp: , Rfl:   •  cefdinir (OMNICEF) 300 MG capsule, , Disp: , Rfl:   •  cloNIDine (CATAPRES) 0.2 MG tablet, , Disp: , Rfl:   •  predniSONE (DELTASONE) 10 MG (21) tablet pack, Use as directed on package, Disp: 21 tablet, Rfl: 0    Health Maintenance   Topic Date Due   • ANNUAL PHYSICAL  1987   • TDAP/TD VACCINES (1 - Tdap) 1995   • PNEUMOCOCCAL VACCINE (19-64 MEDIUM RISK) (1 of 1 - PPSV23) 2003   • PAP SMEAR  2017   • INFLUENZA VACCINE  2019       Immunizations  Td/Tdap(Booster Q 10 yrs): Will discuss at RPE  Flu (Yearly): Will discuss at RPE  Pneumovax (1 yr after Prevnar): Will discuss at RPE    Immunization History   Administered Date(s) Administered   • MMR 2017     Lab Results   Component Value Date    CHLPL 219 (H) 2017    TRIG 131 2017    HDL 44 2017     (H) 2017       Colorectal Screening:  N/A  Pap: Dr. Kirby; has appt tomorrow. Last pap .  Mammogram:  N/A  PSA(Over age 50):  N/A  US Aorta (For male smokers, age 65):  N/A  CT for Smoker (Age 55-75, 30pk yr):  N/A  Bone Density/DEXA:  N/A  Hep C ( 6770-7164):  N/A    Vitals:    20 0944   BP: 140/96   BP Location: Right arm   Patient Position: Sitting   Cuff Size: Adult   Pulse: 86   Resp: 18   Temp: 97.7 °F (36.5 °C)   TempSrc: Temporal   SpO2: 96%   Weight: 98.5 kg (217 lb 3.2 oz)   Height: 162.6 cm (64\")   PainSc: 0-No pain         Physical Exam   Constitutional: She is oriented to person, place, and time. She appears well-developed and well-nourished. No distress.   HENT:   Head: Normocephalic and atraumatic.   Right Ear: Tympanic membrane and external ear normal.   Left Ear: Tympanic membrane and external ear normal.   Mouth/Throat: Oropharynx is clear and moist. No oropharyngeal exudate.   Eyes: Pupils are equal, round, and reactive to light. " Conjunctivae and EOM are normal. Right eye exhibits no discharge. Left eye exhibits no discharge. No scleral icterus.   Neck: Normal range of motion. Neck supple. No JVD present. No tracheal deviation present. No thyromegaly present.   Cardiovascular: Normal rate, regular rhythm and normal heart sounds. Exam reveals no gallop and no friction rub.   No murmur heard.  Pulmonary/Chest: Effort normal and breath sounds normal. No stridor. No respiratory distress. She has no wheezes. She has no rales.   Abdominal: Soft. Bowel sounds are normal. She exhibits no distension and no mass. There is no tenderness. There is no rebound and no guarding.   Limited by obesity.   Musculoskeletal: She exhibits no edema.   Lymphadenopathy:     She has no cervical adenopathy.   Neurological: She is alert and oriented to person, place, and time. She exhibits normal muscle tone.   Skin: Skin is warm and dry. Capillary refill takes less than 2 seconds. No rash noted. She is not diaphoretic.   Psychiatric: She has a normal mood and affect.   Vitals reviewed.       Assessment and Plan: 34 y.o. female here to establish care  Anxiety  Stable on Xanax, clonidine per psych.    Sinusitis  Improving.  Complete course of cefdinir and Omnicef as prescribed.  Can try OTC Mucinex for residual chest congestion.  Call for any new fevers, worsening chest tightness, increasing sputum, blood in sputum, wheezing etc.  Discussed cough may linger for several weeks but should slowly improve.    Attention deficit hyperactivity disorder (ADHD)  Stable on Adderall per psych.  Discussed with patient that she will need to continue following with a psychiatrist as I do not prescribe adult ADHD meds.  She is agreeable to this.    Elevated BP without diagnosis of hypertension  Improved on repeat.  Likely some underlying whitecoat hypertension but also discussed that obesity, tobacco use and recent prednisone may be contributing.  Advised home BP checks with automatic  "arm cuff, limiting dietary sodium as able.  Follow-up in 4 weeks to reassess.    Encounter for vitamin deficiency screening  Per patient request check vitamin D and B12.    Tobacco use  Advised pt re: risks of continued smoking (i.e. Risk of infection, asthma exacerbation, future risk of lung CA etc) and benefits of quitting (improved pulmonary health, financial benefits etc). Options for quitting reviewed (\"cold turkey\" vs nicotine replacement therapy--I.e. Patch vs meds like Chantix or Wellbutrin). Pt is contemplative and plans to start Chantix with the approval of her psychiatrist. Discussed potential of setting a quit date and/or goal of reducing cigarette consumption by 1-2 cigarettes every few days until finally able to quit.        Healthcare Maintenance:   1.  Patient will return for fasting labs to include CBC, CMP, lipids, A1c in addition to vitamin checks as above  2.  Obtain prior PCP records  3.  Follows with GYN for Pap smears  4.  Discussed immunizations at next    Return in about 4 weeks (around 2/10/2020) for Annual physical.    Alejandrina Ludwig MD  1/13/2020    "

## 2020-01-13 ENCOUNTER — OFFICE VISIT (OUTPATIENT)
Dept: INTERNAL MEDICINE | Facility: CLINIC | Age: 35
End: 2020-01-13

## 2020-01-13 VITALS
OXYGEN SATURATION: 96 % | BODY MASS INDEX: 37.08 KG/M2 | RESPIRATION RATE: 18 BRPM | SYSTOLIC BLOOD PRESSURE: 140 MMHG | HEIGHT: 64 IN | DIASTOLIC BLOOD PRESSURE: 96 MMHG | WEIGHT: 217.2 LBS | TEMPERATURE: 97.7 F | HEART RATE: 86 BPM

## 2020-01-13 DIAGNOSIS — F90.9 ATTENTION DEFICIT HYPERACTIVITY DISORDER (ADHD), UNSPECIFIED ADHD TYPE: ICD-10-CM

## 2020-01-13 DIAGNOSIS — Z13.6 ENCOUNTER FOR LIPID SCREENING FOR CARDIOVASCULAR DISEASE: ICD-10-CM

## 2020-01-13 DIAGNOSIS — Z13.1 ENCOUNTER FOR SCREENING FOR DIABETES MELLITUS: ICD-10-CM

## 2020-01-13 DIAGNOSIS — J32.9 SINUSITIS, UNSPECIFIED CHRONICITY, UNSPECIFIED LOCATION: ICD-10-CM

## 2020-01-13 DIAGNOSIS — Z72.0 TOBACCO USE: ICD-10-CM

## 2020-01-13 DIAGNOSIS — Z13.21 ENCOUNTER FOR VITAMIN DEFICIENCY SCREENING: ICD-10-CM

## 2020-01-13 DIAGNOSIS — R03.0 ELEVATED BP WITHOUT DIAGNOSIS OF HYPERTENSION: Primary | ICD-10-CM

## 2020-01-13 DIAGNOSIS — F41.9 ANXIETY: ICD-10-CM

## 2020-01-13 DIAGNOSIS — Z13.220 ENCOUNTER FOR LIPID SCREENING FOR CARDIOVASCULAR DISEASE: ICD-10-CM

## 2020-01-13 PROCEDURE — 99204 OFFICE O/P NEW MOD 45 MIN: CPT | Performed by: INTERNAL MEDICINE

## 2020-01-13 RX ORDER — CEFDINIR 300 MG/1
CAPSULE ORAL
COMMUNITY
Start: 2020-01-10 | End: 2021-03-16

## 2020-01-13 NOTE — ASSESSMENT & PLAN NOTE
"Advised pt re: risks of continued smoking (i.e. Risk of infection, asthma exacerbation, future risk of lung CA etc) and benefits of quitting (improved pulmonary health, financial benefits etc). Options for quitting reviewed (\"cold turkey\" vs nicotine replacement therapy--I.e. Patch vs meds like Chantix or Wellbutrin). Pt is contemplative and plans to start Chantix with the approval of her psychiatrist. Discussed potential of setting a quit date and/or goal of reducing cigarette consumption by 1-2 cigarettes every few days until finally able to quit.      "

## 2020-01-13 NOTE — ASSESSMENT & PLAN NOTE
Stable on Adderall per psych.  Discussed with patient that she will need to continue following with a psychiatrist as I do not prescribe adult ADHD meds.  She is agreeable to this.

## 2020-01-13 NOTE — ASSESSMENT & PLAN NOTE
Improving.  Complete course of cefdinir and Omnicef as prescribed.  Can try OTC Mucinex for residual chest congestion.  Call for any new fevers, worsening chest tightness, increasing sputum, blood in sputum, wheezing etc.  Discussed cough may linger for several weeks but should slowly improve.

## 2020-01-13 NOTE — ASSESSMENT & PLAN NOTE
Improved on repeat.  Likely some underlying whitecoat hypertension but also discussed that obesity, tobacco use and recent prednisone may be contributing.  Advised home BP checks with automatic arm cuff, limiting dietary sodium as able.  Follow-up in 4 weeks to reassess.

## 2020-08-17 ENCOUNTER — APPOINTMENT (OUTPATIENT)
Dept: PREADMISSION TESTING | Facility: HOSPITAL | Age: 35
End: 2020-08-17

## 2020-08-17 PROCEDURE — U0004 COV-19 TEST NON-CDC HGH THRU: HCPCS

## 2020-08-17 PROCEDURE — C9803 HOPD COVID-19 SPEC COLLECT: HCPCS

## 2020-08-17 PROCEDURE — U0002 COVID-19 LAB TEST NON-CDC: HCPCS

## 2020-08-18 ENCOUNTER — LAB REQUISITION (OUTPATIENT)
Dept: LAB | Facility: HOSPITAL | Age: 35
End: 2020-08-18

## 2020-08-18 DIAGNOSIS — Z00.00 ROUTINE GENERAL MEDICAL EXAMINATION AT A HEALTH CARE FACILITY: ICD-10-CM

## 2020-08-18 LAB
BASOPHILS # BLD AUTO: 0.08 10*3/MM3 (ref 0–0.2)
BASOPHILS NFR BLD AUTO: 0.7 % (ref 0–1.5)
DEPRECATED RDW RBC AUTO: 43.6 FL (ref 37–54)
EOSINOPHIL # BLD AUTO: 0.12 10*3/MM3 (ref 0–0.4)
EOSINOPHIL NFR BLD AUTO: 1.1 % (ref 0.3–6.2)
ERYTHROCYTE [DISTWIDTH] IN BLOOD BY AUTOMATED COUNT: 13 % (ref 12.3–15.4)
HCG INTACT+B SERPL-ACNC: <0.5 MIU/ML
HCT VFR BLD AUTO: 43.9 % (ref 34–46.6)
HGB BLD-MCNC: 14.5 G/DL (ref 12–15.9)
IMM GRANULOCYTES # BLD AUTO: 0.06 10*3/MM3 (ref 0–0.05)
IMM GRANULOCYTES NFR BLD AUTO: 0.5 % (ref 0–0.5)
LYMPHOCYTES # BLD AUTO: 2.59 10*3/MM3 (ref 0.7–3.1)
LYMPHOCYTES NFR BLD AUTO: 23.3 % (ref 19.6–45.3)
MCH RBC QN AUTO: 30.3 PG (ref 26.6–33)
MCHC RBC AUTO-ENTMCNC: 33 G/DL (ref 31.5–35.7)
MCV RBC AUTO: 91.6 FL (ref 79–97)
MONOCYTES # BLD AUTO: 0.82 10*3/MM3 (ref 0.1–0.9)
MONOCYTES NFR BLD AUTO: 7.4 % (ref 5–12)
NEUTROPHILS NFR BLD AUTO: 67 % (ref 42.7–76)
NEUTROPHILS NFR BLD AUTO: 7.44 10*3/MM3 (ref 1.7–7)
NRBC BLD AUTO-RTO: 0 /100 WBC (ref 0–0.2)
PLATELET # BLD AUTO: 309 10*3/MM3 (ref 140–450)
PMV BLD AUTO: 11.1 FL (ref 6–12)
RBC # BLD AUTO: 4.79 10*6/MM3 (ref 3.77–5.28)
REF LAB TEST METHOD: NORMAL
SARS-COV-2 RNA RESP QL NAA+PROBE: NOT DETECTED
WBC # BLD AUTO: 11.11 10*3/MM3 (ref 3.4–10.8)

## 2020-08-18 PROCEDURE — 85025 COMPLETE CBC W/AUTO DIFF WBC: CPT

## 2020-08-18 PROCEDURE — 84702 CHORIONIC GONADOTROPIN TEST: CPT

## 2020-08-19 ENCOUNTER — LAB REQUISITION (OUTPATIENT)
Dept: LAB | Facility: HOSPITAL | Age: 35
End: 2020-08-19

## 2020-08-19 ENCOUNTER — TRANSCRIBE ORDERS (OUTPATIENT)
Dept: LAB | Facility: HOSPITAL | Age: 35
End: 2020-08-19

## 2020-08-19 ENCOUNTER — LAB (OUTPATIENT)
Dept: LAB | Facility: HOSPITAL | Age: 35
End: 2020-08-19

## 2020-08-19 DIAGNOSIS — I10 HYPERTENSION, UNSPECIFIED TYPE: Primary | ICD-10-CM

## 2020-08-19 DIAGNOSIS — Z30.2 ENCOUNTER FOR STERILIZATION: ICD-10-CM

## 2020-08-19 DIAGNOSIS — I10 HYPERTENSION, UNSPECIFIED TYPE: ICD-10-CM

## 2020-08-19 LAB — POTASSIUM SERPL-SCNC: 3.6 MMOL/L (ref 3.5–5.2)

## 2020-08-19 PROCEDURE — 84132 ASSAY OF SERUM POTASSIUM: CPT

## 2020-08-19 PROCEDURE — 36415 COLL VENOUS BLD VENIPUNCTURE: CPT

## 2020-08-19 PROCEDURE — 88305 TISSUE EXAM BY PATHOLOGIST: CPT | Performed by: OBSTETRICS & GYNECOLOGY

## 2020-08-21 LAB
CYTO UR: NORMAL
LAB AP CASE REPORT: NORMAL
LAB AP CLINICAL INFORMATION: NORMAL
PATH REPORT.FINAL DX SPEC: NORMAL
PATH REPORT.GROSS SPEC: NORMAL

## 2020-12-10 RX ORDER — IBUPROFEN 600 MG/1
TABLET ORAL
Qty: 25 TABLET | Refills: 0 | Status: SHIPPED | OUTPATIENT
Start: 2020-12-10 | End: 2021-03-16

## 2020-12-13 RX ORDER — IBUPROFEN 600 MG/1
TABLET ORAL
Qty: 25 TABLET | Refills: 0 | OUTPATIENT
Start: 2020-12-13

## 2021-01-15 RX ORDER — PROMETHAZINE HYDROCHLORIDE 25 MG/1
TABLET ORAL
Qty: 25 TABLET | OUTPATIENT
Start: 2021-01-15

## 2021-03-02 ENCOUNTER — OFFICE VISIT (OUTPATIENT)
Dept: OBSTETRICS AND GYNECOLOGY | Facility: CLINIC | Age: 36
End: 2021-03-02

## 2021-03-02 VITALS
SYSTOLIC BLOOD PRESSURE: 122 MMHG | BODY MASS INDEX: 41.32 KG/M2 | DIASTOLIC BLOOD PRESSURE: 90 MMHG | WEIGHT: 242 LBS | HEIGHT: 64 IN

## 2021-03-02 DIAGNOSIS — Z01.419 PAP TEST, AS PART OF ROUTINE GYNECOLOGICAL EXAMINATION: Primary | ICD-10-CM

## 2021-03-02 DIAGNOSIS — D28.0: ICD-10-CM

## 2021-03-02 DIAGNOSIS — I25.118 CORONARY ARTERY DISEASE OF NATIVE HEART WITH STABLE ANGINA PECTORIS, UNSPECIFIED VESSEL OR LESION TYPE (HCC): ICD-10-CM

## 2021-03-02 PROCEDURE — 99395 PREV VISIT EST AGE 18-39: CPT | Performed by: OBSTETRICS & GYNECOLOGY

## 2021-03-02 PROCEDURE — 56605 BIOPSY OF VULVA/PERINEUM: CPT | Performed by: OBSTETRICS & GYNECOLOGY

## 2021-03-02 RX ORDER — LORATADINE 10 MG
81 TABLET ORAL DAILY
COMMUNITY
Start: 2021-02-21 | End: 2022-10-25 | Stop reason: HOSPADM

## 2021-03-02 RX ORDER — PRASUGREL 10 MG/1
10 TABLET, FILM COATED ORAL DAILY
Status: ON HOLD | COMMUNITY
Start: 2021-02-21 | End: 2022-03-15

## 2021-03-02 RX ORDER — ESCITALOPRAM OXALATE 10 MG/1
10 TABLET ORAL DAILY
Status: ON HOLD | COMMUNITY
End: 2022-03-15

## 2021-03-02 RX ORDER — ATORVASTATIN CALCIUM 80 MG/1
80 TABLET, FILM COATED ORAL DAILY
Status: ON HOLD | COMMUNITY
Start: 2021-02-21 | End: 2023-03-30

## 2021-03-02 RX ORDER — TAMSULOSIN HYDROCHLORIDE 0.4 MG/1
1 CAPSULE ORAL DAILY
COMMUNITY
Start: 2020-12-20 | End: 2021-03-16

## 2021-03-02 NOTE — PROGRESS NOTES
Vulvar Biopsy Procedure Note  Procedures     Indications:  Souleymane Grossman is a 35 y.o. , who presents today for vulvar biopsy.  The patient was noted to have a   Right vulvar lesion.   After being presented with the risk, benefits, and specific detail of the procedure, the patient wished to proceed.  Verbal consent was obtained from patient.      Review of Systems negative       Procedure Details   The patient was placed in the dorsal lithotomy position and the area was prepped with betadine.   The area was injected with 1 mL of lidocaine with epinephrine using a 25 gauge needle.  After adequate anesthesia was reached, the skin was flattened with one hand and a sample of the underlying tissue was made with a punch biopsy instrument.  The skin disc was elevated and scissors used to cut the underlying tissue.  The specimen was placed in formalin and sent to pathology for evaluation.  Pressure was applied to the biopsy site to control bleeding and silver nitrate was applied.  Hemostasis was adequate.       There was minimal bleeding.  The patient tolerated the procedure well.     Physical Exam  Genitourinary:         Comments: Lesion on right vulva          Procedures area was picked up with pickups and excised with scissors and cautery silver nitrate was used to cauterize the area.    Complications: none.  Patient tolerated procedure very well        Hanna Kirby MD  2021

## 2021-03-02 NOTE — PROGRESS NOTES
GYN Annual Exam     CC - Here for annual exam.        HPI  Souleymane Grossman is a 35 y.o. female, , who presents for annual well woman exam. No LMP recorded. Patient has had an ablation..  Periods are infrequent since ablation 2020.  Dysmenorrhea:none.  Patient reports problems with: pelvic pain/back pain that has worsened since ablation .  Since her last visit the patient was diagnosed with kidney stones-almost septic 2020, heart attack with stent placement 2021. Partner Status: Marital Status: .  New Partners since last visit: no.  Desires STD Screening: no.    Patient with heart attack last week-states 90% blockage and stent placed. She has f/u with cardiology 2021. Trying to quit smoking.     She is c/o increased pelvic and back pain since ablation, laser of endo last year. She is also c/o 'skittle' sized knot at her vaginal wall-tender to the touch.     She is  from her .     Additional OB/GYN History   Current contraception: contraceptive methods: BTL  Desires to: do not start contraception  Last Pap :   Last Completed Pap Smear       Status Date      PAP SMEAR No completions recorded        History of abnormal Pap smear: yes - h/o colpo  Family history of uterine, colon, breast, or ovarian cancer: yes - ovarian CA in her MGM  Performs monthly Self-Breast Exam: no  Exercises Regularly:no  Feelings of Anxiety or Depression: yes - h/o anxiety  Tobacco Usage?: Yes Souleymane Grossman  reports that she has been smoking cigarettes. She has been smoking about 1.00 pack per day. She has never used smokeless tobacco.. I have educated her on the risk of diseases from using tobacco products such as heart disease and had heart attack and stent.     I advised her to quit and she is willing to quit. We have discussed the following method/s for tobacco cessation:  Counseling OTC Cessation Products Has had recent blockage of the coronary artery and needs to stop smoking.  Together we have  "set a quit date for 1 month from today.  She will follow up with me in 4 weeks or sooner to check on her progress.    I spent 5 minutes counseling the patient.        OB History        2    Para   2    Term   2       0    AB   0    Living   2       SAB   0    TAB   0    Ectopic   0    Molar        Multiple   0    Live Births   2                Health Maintenance   Topic Date Due   • Annual Gynecologic Pelvic and Breast Exam  1984   • ANNUAL PHYSICAL  1987   • Pneumococcal Vaccine 0-64 (1 of 1 - PPSV23) 1990   • HEPATITIS C SCREENING  2017   • PAP SMEAR  2017   • INFLUENZA VACCINE  2020   • TDAP/TD VACCINES (2 - Td) 2027   • Hepatitis B  Aged Out   • MENINGOCOCCAL VACCINE  Aged Out       The additional following portions of the patient's history were reviewed and updated as appropriate: allergies, current medications, past family history, past medical history, past social history, past surgical history and problem list.    Review of Systems   Constitutional: Negative.    HENT: Negative.    Respiratory: Negative.    Cardiovascular: Negative.    Gastrointestinal: Negative.    Genitourinary: Positive for pelvic pain.   Musculoskeletal: Negative.    Skin: Negative.    Allergic/Immunologic: Negative.    Neurological: Negative.    Hematological: Negative.    Psychiatric/Behavioral: Negative.      All other systems reviewed and are negative.     I have reviewed and agree with the HPI, ROS, and historical information as entered above. Hanna Kirby MD    Objective   /90   Ht 162.6 cm (64\")   Wt 110 kg (242 lb)   BMI 41.54 kg/m²     Physical Exam  Vitals signs and nursing note reviewed. Exam conducted with a chaperone present.   Constitutional:       Appearance: She is well-developed.   HENT:      Head: Normocephalic and atraumatic.   Neck:      Musculoskeletal: Normal range of motion. No muscular tenderness.      Thyroid: No thyroid mass or thyromegaly. "   Cardiovascular:      Rate and Rhythm: Normal rate and regular rhythm.      Heart sounds: No murmur.   Pulmonary:      Effort: Pulmonary effort is normal. No retractions.      Breath sounds: Normal breath sounds. No wheezing, rhonchi or rales.   Chest:      Chest wall: No mass or tenderness.      Breasts:         Right: Normal. No mass, nipple discharge, skin change or tenderness.         Left: Normal. No mass, nipple discharge, skin change or tenderness.   Abdominal:      General: Bowel sounds are normal.      Palpations: Abdomen is soft. Abdomen is not rigid. There is no mass.      Tenderness: There is no abdominal tenderness. There is no guarding.      Hernia: No hernia is present. There is no hernia in the left inguinal area.   Genitourinary:     Labia:         Right: No rash, tenderness or lesion.         Left: No rash, tenderness or lesion.       Vagina: Normal. No vaginal discharge or lesions.      Cervix: No cervical motion tenderness, discharge, lesion or cervical bleeding.      Uterus: Normal. Not enlarged, not fixed and not tender.       Adnexa:         Right: No mass or tenderness.          Left: No mass or tenderness.        Rectum: No external hemorrhoid.          Comments: 0.5 cm skin lesion here which we will remove  Neurological:      Mental Status: She is alert and oriented to person, place, and time.   Psychiatric:         Behavior: Behavior normal.            Assessment and Plan    Problem List Items Addressed This Visit     None      Visit Diagnoses     Pap test, as part of routine gynecological examination    -  Primary    Relevant Orders    Pap IG, Rfx HPV ASCU          1. GYN annual well woman exam.   2. Reviewed monthly self breast exams.  Instructed to call with lumps, pain, or breast discharge.    3. Fibrocystic breast changes - Encouraged decreasing caffeine, supportive bra, low dose vitamin E supplementation.  4. Reviewed exercise as a preventative health measures.   5. Recommended use  of Vitamin D replacement and getting adequate calcium in her diet. (1500mg)  6. Smoking cessation encouraged.  Resources reviewed. (See above for full cessation details).  7. Other: Will refer her to cardiologist and she is going to try quit smoking she also has some pelvic pain dysmenorrhea after an ablation and may need a hysterectomy in the future as she also has endometriosis.  At this time we told her that she should not do that due to her recent cardiac evaluation.      Hanna Kirby MD  03/02/2021

## 2021-03-04 DIAGNOSIS — Z01.419 PAP TEST, AS PART OF ROUTINE GYNECOLOGICAL EXAMINATION: ICD-10-CM

## 2021-03-08 DIAGNOSIS — D28.0: ICD-10-CM

## 2021-03-16 ENCOUNTER — APPOINTMENT (OUTPATIENT)
Dept: OTHER | Facility: HOSPITAL | Age: 36
End: 2021-03-16

## 2021-03-16 ENCOUNTER — APPOINTMENT (OUTPATIENT)
Dept: GENERAL RADIOLOGY | Facility: HOSPITAL | Age: 36
End: 2021-03-16

## 2021-03-16 ENCOUNTER — HOSPITAL ENCOUNTER (OUTPATIENT)
Facility: HOSPITAL | Age: 36
Setting detail: OBSERVATION
Discharge: HOME | End: 2021-03-18
Attending: EMERGENCY MEDICINE | Admitting: INTERNAL MEDICINE

## 2021-03-16 DIAGNOSIS — I20.0 UNSTABLE ANGINA (HCC): Primary | ICD-10-CM

## 2021-03-16 PROBLEM — R07.9 CHEST PAIN: Status: ACTIVE | Noted: 2021-03-16

## 2021-03-16 PROBLEM — I10 ESSENTIAL HYPERTENSION: Chronic | Status: ACTIVE | Noted: 2021-03-16

## 2021-03-16 PROBLEM — F12.90 MARIJUANA USE: Chronic | Status: ACTIVE | Noted: 2021-03-16

## 2021-03-16 PROBLEM — I25.10 CAD (CORONARY ARTERY DISEASE): Chronic | Status: ACTIVE | Noted: 2021-03-16

## 2021-03-16 PROBLEM — I25.2 HISTORY OF MI (MYOCARDIAL INFARCTION): Status: ACTIVE | Noted: 2021-03-16

## 2021-03-16 LAB
ALBUMIN SERPL-MCNC: 3.9 G/DL (ref 3.5–5.2)
ALBUMIN/GLOB SERPL: 1.4 G/DL
ALP SERPL-CCNC: 90 U/L (ref 39–117)
ALT SERPL W P-5'-P-CCNC: 20 U/L (ref 1–33)
ANION GAP SERPL CALCULATED.3IONS-SCNC: 8 MMOL/L (ref 5–15)
AST SERPL-CCNC: 20 U/L (ref 1–32)
B-HCG UR QL: NEGATIVE
BASOPHILS # BLD AUTO: 0.05 10*3/MM3 (ref 0–0.2)
BASOPHILS NFR BLD AUTO: 0.6 % (ref 0–1.5)
BILIRUB SERPL-MCNC: 0.3 MG/DL (ref 0–1.2)
BUN SERPL-MCNC: 9 MG/DL (ref 6–20)
BUN/CREAT SERPL: 14.8 (ref 7–25)
CALCIUM SPEC-SCNC: 8.7 MG/DL (ref 8.6–10.5)
CHLORIDE SERPL-SCNC: 105 MMOL/L (ref 98–107)
CO2 SERPL-SCNC: 28 MMOL/L (ref 22–29)
CREAT SERPL-MCNC: 0.61 MG/DL (ref 0.57–1)
DEPRECATED RDW RBC AUTO: 44.7 FL (ref 37–54)
EOSINOPHIL # BLD AUTO: 0.15 10*3/MM3 (ref 0–0.4)
EOSINOPHIL NFR BLD AUTO: 1.7 % (ref 0.3–6.2)
ERYTHROCYTE [DISTWIDTH] IN BLOOD BY AUTOMATED COUNT: 13.2 % (ref 12.3–15.4)
GFR SERPL CREATININE-BSD FRML MDRD: 111 ML/MIN/1.73
GLOBULIN UR ELPH-MCNC: 2.7 GM/DL
GLUCOSE SERPL-MCNC: 115 MG/DL (ref 65–99)
HCT VFR BLD AUTO: 39.9 % (ref 34–46.6)
HGB BLD-MCNC: 13.2 G/DL (ref 12–15.9)
HOLD SPECIMEN: NORMAL
IMM GRANULOCYTES # BLD AUTO: 0.03 10*3/MM3 (ref 0–0.05)
IMM GRANULOCYTES NFR BLD AUTO: 0.3 % (ref 0–0.5)
INTERNAL NEGATIVE CONTROL: NEGATIVE
INTERNAL POSITIVE CONTROL: POSITIVE
LIPASE SERPL-CCNC: 23 U/L (ref 13–60)
LYMPHOCYTES # BLD AUTO: 2.56 10*3/MM3 (ref 0.7–3.1)
LYMPHOCYTES NFR BLD AUTO: 28.8 % (ref 19.6–45.3)
Lab: NORMAL
MCH RBC QN AUTO: 30.6 PG (ref 26.6–33)
MCHC RBC AUTO-ENTMCNC: 33.1 G/DL (ref 31.5–35.7)
MCV RBC AUTO: 92.6 FL (ref 79–97)
MONOCYTES # BLD AUTO: 0.49 10*3/MM3 (ref 0.1–0.9)
MONOCYTES NFR BLD AUTO: 5.5 % (ref 5–12)
NEUTROPHILS NFR BLD AUTO: 5.6 10*3/MM3 (ref 1.7–7)
NEUTROPHILS NFR BLD AUTO: 63.1 % (ref 42.7–76)
NRBC BLD AUTO-RTO: 0 /100 WBC (ref 0–0.2)
NT-PROBNP SERPL-MCNC: 335.4 PG/ML (ref 0–450)
PLATELET # BLD AUTO: 263 10*3/MM3 (ref 140–450)
PMV BLD AUTO: 10.7 FL (ref 6–12)
POTASSIUM SERPL-SCNC: 3.9 MMOL/L (ref 3.5–5.2)
PROT SERPL-MCNC: 6.6 G/DL (ref 6–8.5)
RBC # BLD AUTO: 4.31 10*6/MM3 (ref 3.77–5.28)
SARS-COV-2 RNA RESP QL NAA+PROBE: NOT DETECTED
SODIUM SERPL-SCNC: 141 MMOL/L (ref 136–145)
TROPONIN T SERPL-MCNC: <0.01 NG/ML (ref 0–0.03)
WBC # BLD AUTO: 8.88 10*3/MM3 (ref 3.4–10.8)
WHOLE BLOOD HOLD SPECIMEN: NORMAL
WHOLE BLOOD HOLD SPECIMEN: NORMAL

## 2021-03-16 PROCEDURE — 84484 ASSAY OF TROPONIN QUANT: CPT | Performed by: EMERGENCY MEDICINE

## 2021-03-16 PROCEDURE — 83880 ASSAY OF NATRIURETIC PEPTIDE: CPT | Performed by: EMERGENCY MEDICINE

## 2021-03-16 PROCEDURE — 99220 PR INITIAL OBSERVATION CARE/DAY 70 MINUTES: CPT | Performed by: NURSE PRACTITIONER

## 2021-03-16 PROCEDURE — 83690 ASSAY OF LIPASE: CPT | Performed by: EMERGENCY MEDICINE

## 2021-03-16 PROCEDURE — U0003 INFECTIOUS AGENT DETECTION BY NUCLEIC ACID (DNA OR RNA); SEVERE ACUTE RESPIRATORY SYNDROME CORONAVIRUS 2 (SARS-COV-2) (CORONAVIRUS DISEASE [COVID-19]), AMPLIFIED PROBE TECHNIQUE, MAKING USE OF HIGH THROUGHPUT TECHNOLOGIES AS DESCRIBED BY CMS-2020-01-R: HCPCS | Performed by: EMERGENCY MEDICINE

## 2021-03-16 PROCEDURE — G0378 HOSPITAL OBSERVATION PER HR: HCPCS

## 2021-03-16 PROCEDURE — 85025 COMPLETE CBC W/AUTO DIFF WBC: CPT

## 2021-03-16 PROCEDURE — 81025 URINE PREGNANCY TEST: CPT | Performed by: EMERGENCY MEDICINE

## 2021-03-16 PROCEDURE — 84484 ASSAY OF TROPONIN QUANT: CPT | Performed by: NURSE PRACTITIONER

## 2021-03-16 PROCEDURE — C9803 HOPD COVID-19 SPEC COLLECT: HCPCS

## 2021-03-16 PROCEDURE — 96375 TX/PRO/DX INJ NEW DRUG ADDON: CPT

## 2021-03-16 PROCEDURE — 93005 ELECTROCARDIOGRAM TRACING: CPT

## 2021-03-16 PROCEDURE — 71045 X-RAY EXAM CHEST 1 VIEW: CPT

## 2021-03-16 PROCEDURE — 99284 EMERGENCY DEPT VISIT MOD MDM: CPT

## 2021-03-16 PROCEDURE — 96365 THER/PROPH/DIAG IV INF INIT: CPT

## 2021-03-16 PROCEDURE — 93005 ELECTROCARDIOGRAM TRACING: CPT | Performed by: EMERGENCY MEDICINE

## 2021-03-16 PROCEDURE — 25010000002 FUROSEMIDE PER 20 MG: Performed by: EMERGENCY MEDICINE

## 2021-03-16 PROCEDURE — 80053 COMPREHEN METABOLIC PANEL: CPT | Performed by: EMERGENCY MEDICINE

## 2021-03-16 PROCEDURE — 25010000002 MORPHINE PER 10 MG: Performed by: NURSE PRACTITIONER

## 2021-03-16 RX ORDER — PRASUGREL 10 MG/1
10 TABLET, FILM COATED ORAL DAILY
Status: DISCONTINUED | OUTPATIENT
Start: 2021-03-17 | End: 2021-03-18 | Stop reason: HOSPADM

## 2021-03-16 RX ORDER — SODIUM CHLORIDE 0.9 % (FLUSH) 0.9 %
10 SYRINGE (ML) INJECTION AS NEEDED
Status: DISCONTINUED | OUTPATIENT
Start: 2021-03-16 | End: 2021-03-18 | Stop reason: HOSPADM

## 2021-03-16 RX ORDER — SODIUM CHLORIDE 0.9 % (FLUSH) 0.9 %
10 SYRINGE (ML) INJECTION EVERY 12 HOURS SCHEDULED
Status: DISCONTINUED | OUTPATIENT
Start: 2021-03-16 | End: 2021-03-18 | Stop reason: HOSPADM

## 2021-03-16 RX ORDER — ACETAMINOPHEN 325 MG/1
650 TABLET ORAL EVERY 4 HOURS PRN
Status: DISCONTINUED | OUTPATIENT
Start: 2021-03-16 | End: 2021-03-18 | Stop reason: HOSPADM

## 2021-03-16 RX ORDER — ESCITALOPRAM OXALATE 10 MG/1
10 TABLET ORAL DAILY
Status: DISCONTINUED | OUTPATIENT
Start: 2021-03-17 | End: 2021-03-18 | Stop reason: HOSPADM

## 2021-03-16 RX ORDER — ACETAMINOPHEN 650 MG/1
650 SUPPOSITORY RECTAL EVERY 4 HOURS PRN
Status: DISCONTINUED | OUTPATIENT
Start: 2021-03-16 | End: 2021-03-18 | Stop reason: HOSPADM

## 2021-03-16 RX ORDER — ACETAMINOPHEN 160 MG/5ML
650 SOLUTION ORAL EVERY 4 HOURS PRN
Status: DISCONTINUED | OUTPATIENT
Start: 2021-03-16 | End: 2021-03-18 | Stop reason: HOSPADM

## 2021-03-16 RX ORDER — ASPIRIN 81 MG/1
324 TABLET, CHEWABLE ORAL ONCE
Status: COMPLETED | OUTPATIENT
Start: 2021-03-16 | End: 2021-03-16

## 2021-03-16 RX ORDER — FUROSEMIDE 10 MG/ML
40 INJECTION INTRAMUSCULAR; INTRAVENOUS ONCE
Status: COMPLETED | OUTPATIENT
Start: 2021-03-16 | End: 2021-03-16

## 2021-03-16 RX ORDER — NICOTINE 21 MG/24HR
1 PATCH, TRANSDERMAL 24 HOURS TRANSDERMAL ONCE
Status: DISCONTINUED | OUTPATIENT
Start: 2021-03-16 | End: 2021-03-17

## 2021-03-16 RX ORDER — MORPHINE SULFATE 2 MG/ML
1 INJECTION, SOLUTION INTRAMUSCULAR; INTRAVENOUS ONCE
Status: COMPLETED | OUTPATIENT
Start: 2021-03-16 | End: 2021-03-16

## 2021-03-16 RX ORDER — NICOTINE 21 MG/24HR
1 PATCH, TRANSDERMAL 24 HOURS TRANSDERMAL EVERY 24 HOURS
Status: DISCONTINUED | OUTPATIENT
Start: 2021-03-17 | End: 2021-03-18 | Stop reason: HOSPADM

## 2021-03-16 RX ORDER — ASPIRIN 81 MG/1
81 TABLET, CHEWABLE ORAL DAILY
Status: DISCONTINUED | OUTPATIENT
Start: 2021-03-17 | End: 2021-03-18 | Stop reason: HOSPADM

## 2021-03-16 RX ORDER — ATORVASTATIN CALCIUM 40 MG/1
80 TABLET, FILM COATED ORAL DAILY
Status: DISCONTINUED | OUTPATIENT
Start: 2021-03-17 | End: 2021-03-18 | Stop reason: HOSPADM

## 2021-03-16 RX ORDER — NITROGLYCERIN 20 MG/100ML
10-50 INJECTION INTRAVENOUS
Status: DISCONTINUED | OUTPATIENT
Start: 2021-03-16 | End: 2021-03-18 | Stop reason: HOSPADM

## 2021-03-16 RX ADMIN — ASPIRIN 243 MG: 81 TABLET, CHEWABLE ORAL at 18:01

## 2021-03-16 RX ADMIN — ACETAMINOPHEN 650 MG: 325 TABLET ORAL at 21:13

## 2021-03-16 RX ADMIN — NITROGLYCERIN 10 MCG/MIN: 20 INJECTION INTRAVENOUS at 22:34

## 2021-03-16 RX ADMIN — METOPROLOL TARTRATE 25 MG: 25 TABLET, FILM COATED ORAL at 21:13

## 2021-03-16 RX ADMIN — FUROSEMIDE 40 MG: 10 INJECTION, SOLUTION INTRAMUSCULAR; INTRAVENOUS at 19:13

## 2021-03-16 RX ADMIN — MORPHINE SULFATE 1 MG: 2 INJECTION, SOLUTION INTRAMUSCULAR; INTRAVENOUS at 22:31

## 2021-03-16 RX ADMIN — NITROGLYCERIN 1 INCH: 20 OINTMENT TOPICAL at 19:11

## 2021-03-16 NOTE — H&P
"    [facility] Medicine Services  HISTORY AND PHYSICAL    Patient Name: Souleymane Grossman  : 1985  MRN: [id number]  Primary Care Physician: Rogelio Yanes MD  Date of admission: 3/16/2021      Subjective   Subjective     Chief Complaint:  Chest pain    HPI:  Souleymane Grossman is a 35 y.o. female with past medical history significant for gestational diabetes, anxiety, ADHD, hypertension, hyperlipidemia, endometriosis, obesity, tobacco abuse, occasional marijuana use, and recently diagnosed CAD s/p NSTEMI.  Patient underwent left heart cath with drug-eluting stent to a 90% occluded circumflex on 2021 at [facility].  Patient reports she was started on baby aspirin, gabapentin, Lipitor, and Lopressor and she has been taking these as prescribed daily.  Does continue to smoke however is attempting to cut back.  Feels she is under a lot of stress recently is going through divorce.  Presents to ER today with sudden onset of midsternal pressure-like chest pain that started approximately noon while arguing on the phone with her .  Mild associated nausea and headache.  Pain originally 5/10 scale increasing to 7/10 scale once she presented to ER.  Troponin x1 -, EKG x1 -.  Labs essentially negative.  Scheduled to establish care with Dr. Juan Diego Oneill on 3/29.  Due to recent NSTEMI and continued chest pain with Nitropaste in ER, will start on nitro drip and admit to hospitalist service.  Consult cardiology in a.m.        COVID Details: [x] No Symptoms  Symptoms: [] Fever []  Cough [] Shortness of breath [] Change in taste or smell  Risks:  [] Direct Exposure [] High risk facility   Date of Onset:  ____  Date of first positive COVID test:     Review of Systems   Constitutional: Positive for activity change and unexpected weight change. Negative for chills, diaphoresis and fever.        Recently gained 10-15 pounds.  Feels \"puffy\".   HENT: Negative for congestion and " trouble swallowing.    Eyes: Negative.    Respiratory: Positive for chest tightness. Negative for cough, choking, shortness of breath and wheezing.    Cardiovascular: Positive for chest pain and leg swelling. Negative for palpitations.   Gastrointestinal: Positive for nausea. Negative for abdominal distention, diarrhea and vomiting.   Endocrine: Negative.    Genitourinary: Negative.    Musculoskeletal: Negative.    Skin: Negative.    Allergic/Immunologic: Negative.    Neurological: Positive for headaches. Negative for dizziness, syncope and light-headedness.   Hematological: Negative.    Psychiatric/Behavioral: Negative.         All other systems reviewed and are negative.     Personal History     Past Medical History:   Diagnosis Date   • Abnormal Pap smear of cervix    • ADHD (attention deficit hyperactivity disorder)    • Anxiety     PCP manages   • Anxiety    • Blood in urine    • CAD (coronary artery disease) 3/16/2021   • Chills    • Chronic headaches    • Endometriosis    • Essential hypertension 3/16/2021   • Fatigue    • Gestational diabetes 2017    took oral meds in 2014 pregnancy; recently dx in 2017 pregnancy    • Heart attack (CMS/[facility])     02/19/2021   • History of MI (myocardial infarction) 3/16/2021    S/p NSTEMI 2/18/2021 with cath and THAIS to circumflex on 2/19/2021   • Kidney stones    • Low back pain    • Marijuana use 3/16/2021    occ vapes marijuana    • Mood changes    • Nausea    • Night sweats    • Obesity, Class III, BMI 40-49.9 (morbid obesity) (CMS/[facility])    • Pelvic pain    • Urinary incontinence    • Weight gain        Past Surgical History:   Procedure Laterality Date   • COLPOSCOPY     • CORONARY ANGIOPLASTY WITH STENT PLACEMENT  02/2021   • CRYOTHERAPY      cervix   • CYSTOSCOPY BLADDER STONE LITHOTRIPSY  02/2017    reports has 2 remaining stones   • CYSTOSCOPY BLADDER STONE LITHOTRIPSY      kidney stones, h/o stent placement   • DILATATION AND CURETTAGE      08/2020   • ENDOMETRIAL  ABLATION  08/2020   • HYSTEROSCOPY  08/2020   • LAPAROSCOPIC TUBAL LIGATION     • WISDOM TOOTH EXTRACTION     • WISDOM TOOTH EXTRACTION         Family History: family history includes Anxiety disorder in her father and mother; Arthritis in her father and mother; Cancer in her maternal grandmother and mother; Colon cancer in an other family member; Diabetes in her brother, father, and mother; Early death in her mother; Hypertension in her father; Mental illness in her father; No Known Problems in her brother, brother, daughter, and daughter; Ovarian cancer in her maternal grandmother; Vision loss in her brother. Otherwise pertinent FHx was reviewed and unremarkable.     Social History:  reports that she has been smoking cigarettes. She has been smoking about 0.50 packs per day. She has never used smokeless tobacco. She reports current alcohol use of about 2.0 standard drinks of alcohol per week. She reports current drug use. Drug: Marijuana.  Social History     Social History Narrative     but going through a divorce.         Medications:  Available home medication information reviewed.  (Not in a hospital admission)      Allergies   Allergen Reactions   • Bactrim [Sulfamethoxazole-Trimethoprim] Rash       Objective   Objective     Vital Signs:   Temp:  [97.7 °F (36.5 °C)] 97.7 °F (36.5 °C)  Heart Rate:  [71-95] 75  Resp:  [18] 18  BP: (117-145)/(72-88) 121/80       Physical Exam   Constitutional: Awake, alert.  Resting back in bed in ER.  No acute distress.  Eyes: PERRLA, sclerae anicteric, no conjunctival injection  HENT: NCAT, mucous membranes moist  Neck: Supple, no thyromegaly, no lymphadenopathy, trachea midline  Respiratory: Clear to auscultation bilaterally A/P, nonlabored respirations on RA.  Cardiovascular: RRR, no murmurs, rubs, or gallops, palpable pedal pulses bilaterally  Gastrointestinal: Positive bowel sounds, soft, nontender, nondistended.  Morbidly obese.  Musculoskeletal: Trace bilateral  ankle edema, no clubbing or cyanosis to extremities.  ZULETA spontaneously.  Psychiatric: Appropriate affect, cooperative  Neurologic: Oriented x 3, strength symmetric in all extremities, Cranial Nerves grossly intact to confrontation, speech clear and appropriate.  Skin: No rashes      Result Review:  I have personally reviewed the results from the time of this admission to 03/16/21 7:56 PM EDT and agree with these findings:  [x]  Laboratory  []  Microbiology  [x]  Radiology  [x]  EKG/Telemetry   []  Cardiology/Vascular   []  Pathology  [x]  Old records  []  Other:  Most notable findings include:       LAB RESULTS:      Lab 03/16/21  1710   WBC 8.88   HEMOGLOBIN 13.2   HEMATOCRIT 39.9   PLATELETS 263   NEUTROS ABS 5.60   IMMATURE GRANS (ABS) 0.03   LYMPHS ABS 2.56   MONOS ABS 0.49   EOS ABS 0.15   MCV 92.6         Lab 03/16/21  1710   SODIUM 141   POTASSIUM 3.9   CHLORIDE 105   CO2 28.0   ANION GAP 8.0   BUN 9   CREATININE 0.61   GLUCOSE 115*   CALCIUM 8.7         Lab 03/16/21  1710   TOTAL PROTEIN 6.6   ALBUMIN 3.90   GLOBULIN 2.7   ALT (SGPT) 20   AST (SGOT) 20   BILIRUBIN 0.3   ALK PHOS 90   LIPASE 23         Lab 03/16/21  1905 03/16/21  1710   PROBNP  --  335.4   TROPONIN T <0.010 <0.010                 Brief Urine Lab Results     None        Microbiology Results (last 10 days)     ** No results found for the last 240 hours. **          XR Chest 1 View    Result Date: 3/16/2021  EXAMINATION: XR CHEST 1 VW-  INDICATION: Chest Pain triage protocol  COMPARISON: 12/12/2019  FINDINGS: There is mild cardiac enlargement and pulmonary vascular engorgement, without overt edema. No focal lobar consolidation. No significant pleural effusion or distinct pneumothorax.      Impression: Mild pulmonary vascular congestion without overt edema or other acute cardiopulmonary abnormality.  This report was finalized on 3/16/2021 6:13 PM by Du Ross.      US Outside Films    Result Date: 3/16/2021  This procedure was  auto-finalized with no dictation required.          Assessment/Plan   Assessment & Plan     Active Hospital Problems    Diagnosis  POA   • **Chest pain [R07.9]  Yes     Priority: High   • History of MI (myocardial infarction) [I25.2]  Not Applicable     Priority: High     S/p NSTEMI 2/18/2021 with cath and THAIS to circumflex on 2/19/2021     • Essential hypertension [I10]  Unknown   • Marijuana use [F12.90]  Unknown     occ vapes marijuana      • CAD (coronary artery disease) [I25.10]  Unknown   • Anxiety [F41.9]  Yes   • Attention deficit hyperactivity disorder (ADHD) [F90.9]  Yes   • Tobacco use [Z72.0]  Yes   • Morbid obesity due to excess calories (CMS/[facility]) [E66.01]  Yes     Souleymane Grossman is a 35 y.o. female with past medical history significant for gestational diabetes, anxiety, ADHD, hypertension, hyperlipidemia, endometriosis, obesity, tobacco abuse, occasional marijuana use, and recently diagnosed CAD s/p NSTEMI.  Patient underwent left heart cath with drug-eluting stent to a 90% occluded circumflex on 2/19/2021 at [facility].  Patient reports she was started on baby aspirin, gabapentin, Lipitor, and Lopressor and she has been taking these as prescribed daily.  Does continue to smoke however is attempting to cut back.  Feels she is under a lot of stress recently is going through divorce.  Presents to ER today with sudden onset of midsternal pressure-like chest pain that started approximately noon while arguing on the phone with her .  Mild associated nausea and headache.  Pain originally 5/10 scale increasing to 7/10 scale once she presented to ER.  Troponin x1 -, EKG x1 -.  Labs essentially negative.  Scheduled to establish care with Dr. Juan Diego Oneill on 3/29.  Due to recent NSTEMI and continued chest pain with Nitropaste in ER, will start on nitro drip and admit to hospitalist service.  Consult cardiology in a.m.    Assessment/plan:    Chest pain/[place]  Hx recent diagnosis CAD s/p  NSTEMI  S/p cath with THAIS to 90% occluded circumflex  HTN/HLD  --Was treated at [facility] 2/18/2021.  --Received full-strength aspirin in ER.  On 81 aspirin, Effient, Lipitor, Lopressor outpatient.  Continue.  --Chest pain persists in ER on nitro patch.  Currently still rating 7/10 scale.  Initiate nitroglycerin drip.  --Reported echo showed LV SF, EF 55 to 60% with normal valve functioning.  --Was scheduled to establish care with Dr. Juan Diego Oneill on 3/29.  Consult cards in a.m.  --N.p.o. after midnight    Anxiety/ADHD  --takes Adderall.  Hold for now.  --Alprazolam as needed.    Obesity  --BMI 42.8    Hx gestational diabetes 2014/2017  --Monitor lab    Ongoing tobacco use  Intermittent marijuana vaping  --Assist/advise  --Nicotine patch      DVT prophylaxis: Sequentials, home aspirin and Effient for now.  (Add heparin drip versus Lovenox as indicated for worsening)      CODE STATUS:    Code Status and Medical Interventions:   Ordered at: 03/16/21 1956     Level Of Support Discussed With:    Patient     Code Status:    CPR     Medical Interventions (Level of Support Prior to Arrest):    Full       Admission Status:  I believe this patient meets OBSERVATION status, however if further evaluation or treatment plans warrant, status may change.  Based upon current information, I predict patient's care encounter to be less than or equal to 2 midnights.      Hailey Bauer, APRN  03/16/21

## 2021-03-17 ENCOUNTER — APPOINTMENT (OUTPATIENT)
Dept: CARDIOLOGY | Facility: HOSPITAL | Age: 36
End: 2021-03-17

## 2021-03-17 LAB
ANION GAP SERPL CALCULATED.3IONS-SCNC: 9 MMOL/L (ref 5–15)
BASOPHILS # BLD AUTO: 0.06 10*3/MM3 (ref 0–0.2)
BASOPHILS NFR BLD AUTO: 0.6 % (ref 0–1.5)
BH CV ECHO MEAS - AO MAX PG (FULL): 1.3 MMHG
BH CV ECHO MEAS - AO MAX PG: 7 MMHG
BH CV ECHO MEAS - AO MEAN PG (FULL): 1 MMHG
BH CV ECHO MEAS - AO MEAN PG: 4 MMHG
BH CV ECHO MEAS - AO ROOT AREA (BSA CORRECTED): 1.5
BH CV ECHO MEAS - AO ROOT AREA: 8 CM^2
BH CV ECHO MEAS - AO ROOT DIAM: 3.2 CM
BH CV ECHO MEAS - AO V2 MAX: 131.5 CM/SEC
BH CV ECHO MEAS - AO V2 MEAN: 97.4 CM/SEC
BH CV ECHO MEAS - AO V2 VTI: 29.6 CM
BH CV ECHO MEAS - ASC AORTA: 3.4 CM
BH CV ECHO MEAS - AVA(I,A): 3 CM^2
BH CV ECHO MEAS - AVA(I,D): 3 CM^2
BH CV ECHO MEAS - AVA(V,A): 2.8 CM^2
BH CV ECHO MEAS - AVA(V,D): 2.8 CM^2
BH CV ECHO MEAS - BSA(HAYCOCK): 2.3 M^2
BH CV ECHO MEAS - BSA: 2.1 M^2
BH CV ECHO MEAS - BZI_BMI: 42.2 KILOGRAMS/M^2
BH CV ECHO MEAS - BZI_METRIC_HEIGHT: 162.6 CM
BH CV ECHO MEAS - BZI_METRIC_WEIGHT: 111.6 KG
BH CV ECHO MEAS - EDV(CUBED): 140.6 ML
BH CV ECHO MEAS - EDV(MOD-SP2): 85.9 ML
BH CV ECHO MEAS - EDV(MOD-SP4): 73 ML
BH CV ECHO MEAS - EDV(TEICH): 129.5 ML
BH CV ECHO MEAS - EF(CUBED): 78.8 %
BH CV ECHO MEAS - EF(MOD-BP): 67.4 %
BH CV ECHO MEAS - EF(MOD-SP2): 66.9 %
BH CV ECHO MEAS - EF(MOD-SP4): 71.2 %
BH CV ECHO MEAS - EF(TEICH): 70.7 %
BH CV ECHO MEAS - ESV(CUBED): 29.8 ML
BH CV ECHO MEAS - ESV(MOD-SP2): 28.4 ML
BH CV ECHO MEAS - ESV(MOD-SP4): 21 ML
BH CV ECHO MEAS - ESV(TEICH): 37.9 ML
BH CV ECHO MEAS - FS: 40.4 %
BH CV ECHO MEAS - IVS/LVPW: 0.77
BH CV ECHO MEAS - IVSD: 1 CM
BH CV ECHO MEAS - LA DIMENSION: 4.1 CM
BH CV ECHO MEAS - LA/AO: 1.3
BH CV ECHO MEAS - LAD MAJOR: 4.9 CM
BH CV ECHO MEAS - LAT PEAK E' VEL: 9.1 CM/SEC
BH CV ECHO MEAS - LATERAL E/E' RATIO: 11.9
BH CV ECHO MEAS - LV DIASTOLIC VOL/BSA (35-75): 34.2 ML/M^2
BH CV ECHO MEAS - LV IVRT: 0.06 SEC
BH CV ECHO MEAS - LV MASS(C)D: 234.6 GRAMS
BH CV ECHO MEAS - LV MASS(C)DI: 109.8 GRAMS/M^2
BH CV ECHO MEAS - LV MAX PG: 5.7 MMHG
BH CV ECHO MEAS - LV MEAN PG: 3 MMHG
BH CV ECHO MEAS - LV SYSTOLIC VOL/BSA (12-30): 9.8 ML/M^2
BH CV ECHO MEAS - LV V1 MAX: 119 CM/SEC
BH CV ECHO MEAS - LV V1 MEAN: 85.8 CM/SEC
BH CV ECHO MEAS - LV V1 VTI: 28.5 CM
BH CV ECHO MEAS - LVIDD: 5.2 CM
BH CV ECHO MEAS - LVIDS: 3.1 CM
BH CV ECHO MEAS - LVLD AP2: 6.7 CM
BH CV ECHO MEAS - LVLD AP4: 6.9 CM
BH CV ECHO MEAS - LVLS AP2: 4.4 CM
BH CV ECHO MEAS - LVLS AP4: 5.1 CM
BH CV ECHO MEAS - LVOT AREA (M): 3.1 CM^2
BH CV ECHO MEAS - LVOT AREA: 3.1 CM^2
BH CV ECHO MEAS - LVOT DIAM: 2 CM
BH CV ECHO MEAS - LVPWD: 1.3 CM
BH CV ECHO MEAS - MED PEAK E' VEL: 7.7 CM/SEC
BH CV ECHO MEAS - MEDIAL E/E' RATIO: 14.1
BH CV ECHO MEAS - MR MAX PG: 38 MMHG
BH CV ECHO MEAS - MR MAX VEL: 306.5 CM/SEC
BH CV ECHO MEAS - MV A MAX VEL: 95.4 CM/SEC
BH CV ECHO MEAS - MV DEC SLOPE: 449 CM/SEC^2
BH CV ECHO MEAS - MV DEC TIME: 0.24 SEC
BH CV ECHO MEAS - MV E MAX VEL: 109 CM/SEC
BH CV ECHO MEAS - MV E/A: 1.1
BH CV ECHO MEAS - MV P1/2T MAX VEL: 108 CM/SEC
BH CV ECHO MEAS - MV P1/2T: 70.5 MSEC
BH CV ECHO MEAS - MVA P1/2T LCG: 2 CM^2
BH CV ECHO MEAS - MVA(P1/2T): 3.1 CM^2
BH CV ECHO MEAS - PA ACC TIME: 0.15 SEC
BH CV ECHO MEAS - PA MAX PG: 3 MMHG
BH CV ECHO MEAS - PA PR(ACCEL): 12.4 MMHG
BH CV ECHO MEAS - PA V2 MAX: 87.3 CM/SEC
BH CV ECHO MEAS - RAP SYSTOLE: 3 MMHG
BH CV ECHO MEAS - RVSP: 30 MMHG
BH CV ECHO MEAS - SI(AO): 111.3 ML/M^2
BH CV ECHO MEAS - SI(CUBED): 51.9 ML/M^2
BH CV ECHO MEAS - SI(LVOT): 41.9 ML/M^2
BH CV ECHO MEAS - SI(MOD-SP2): 26.9 ML/M^2
BH CV ECHO MEAS - SI(MOD-SP4): 24.3 ML/M^2
BH CV ECHO MEAS - SI(TEICH): 42.9 ML/M^2
BH CV ECHO MEAS - SV(AO): 237.7 ML
BH CV ECHO MEAS - SV(CUBED): 110.8 ML
BH CV ECHO MEAS - SV(LVOT): 89.5 ML
BH CV ECHO MEAS - SV(MOD-SP2): 57.5 ML
BH CV ECHO MEAS - SV(MOD-SP4): 52 ML
BH CV ECHO MEAS - SV(TEICH): 91.6 ML
BH CV ECHO MEAS - TAPSE (>1.6): 2.5 CM
BH CV ECHO MEAS - TR MAX PG: 27 MMHG
BH CV ECHO MEAS - TR MAX VEL: 259 CM/SEC
BH CV ECHO MEASUREMENTS AVERAGE E/E' RATIO: 12.98
BH CV VAS BP LEFT ARM: NORMAL MMHG
BH CV XLRA - RV BASE: 3.3 CM
BH CV XLRA - RV LENGTH: 7.6 CM
BH CV XLRA - RV MID: 3.4 CM
BH CV XLRA - TDI S': 12.7 CM/SEC
BUN SERPL-MCNC: 16 MG/DL (ref 6–20)
BUN/CREAT SERPL: 27.1 (ref 7–25)
CALCIUM SPEC-SCNC: 8.6 MG/DL (ref 8.6–10.5)
CHLORIDE SERPL-SCNC: 102 MMOL/L (ref 98–107)
CO2 SERPL-SCNC: 27 MMOL/L (ref 22–29)
CREAT SERPL-MCNC: 0.59 MG/DL (ref 0.57–1)
DEPRECATED RDW RBC AUTO: 44.4 FL (ref 37–54)
EOSINOPHIL # BLD AUTO: 0.21 10*3/MM3 (ref 0–0.4)
EOSINOPHIL NFR BLD AUTO: 2.3 % (ref 0.3–6.2)
ERYTHROCYTE [DISTWIDTH] IN BLOOD BY AUTOMATED COUNT: 13.2 % (ref 12.3–15.4)
GFR SERPL CREATININE-BSD FRML MDRD: 115 ML/MIN/1.73
GLUCOSE SERPL-MCNC: 98 MG/DL (ref 65–99)
HCT VFR BLD AUTO: 37.7 % (ref 34–46.6)
HGB BLD-MCNC: 12.4 G/DL (ref 12–15.9)
IMM GRANULOCYTES # BLD AUTO: 0.04 10*3/MM3 (ref 0–0.05)
IMM GRANULOCYTES NFR BLD AUTO: 0.4 % (ref 0–0.5)
INR PPP: 1.05 (ref 0.85–1.16)
LEFT ATRIUM VOLUME INDEX: 32.6 ML/M^2
LEFT ATRIUM VOLUME: 69.6 ML
LV EF 2D ECHO EST: 67 %
LYMPHOCYTES # BLD AUTO: 2.44 10*3/MM3 (ref 0.7–3.1)
LYMPHOCYTES NFR BLD AUTO: 26.2 % (ref 19.6–45.3)
MCH RBC QN AUTO: 30.4 PG (ref 26.6–33)
MCHC RBC AUTO-ENTMCNC: 32.9 G/DL (ref 31.5–35.7)
MCV RBC AUTO: 92.4 FL (ref 79–97)
MONOCYTES # BLD AUTO: 0.55 10*3/MM3 (ref 0.1–0.9)
MONOCYTES NFR BLD AUTO: 5.9 % (ref 5–12)
NEUTROPHILS NFR BLD AUTO: 6.02 10*3/MM3 (ref 1.7–7)
NEUTROPHILS NFR BLD AUTO: 64.6 % (ref 42.7–76)
NRBC BLD AUTO-RTO: 0 /100 WBC (ref 0–0.2)
NT-PROBNP SERPL-MCNC: 154.3 PG/ML (ref 0–450)
PLATELET # BLD AUTO: 235 10*3/MM3 (ref 140–450)
PMV BLD AUTO: 10.8 FL (ref 6–12)
POTASSIUM SERPL-SCNC: 3.7 MMOL/L (ref 3.5–5.2)
PROTHROMBIN TIME: 13.4 SECONDS (ref 11.5–14)
QT INTERVAL: 436 MS
QTC INTERVAL: 449 MS
RBC # BLD AUTO: 4.08 10*6/MM3 (ref 3.77–5.28)
SODIUM SERPL-SCNC: 138 MMOL/L (ref 136–145)
TROPONIN T SERPL-MCNC: <0.01 NG/ML (ref 0–0.03)
TROPONIN T SERPL-MCNC: <0.01 NG/ML (ref 0–0.03)
WBC # BLD AUTO: 9.32 10*3/MM3 (ref 3.4–10.8)

## 2021-03-17 PROCEDURE — 93005 ELECTROCARDIOGRAM TRACING: CPT | Performed by: NURSE PRACTITIONER

## 2021-03-17 PROCEDURE — 85610 PROTHROMBIN TIME: CPT | Performed by: NURSE PRACTITIONER

## 2021-03-17 PROCEDURE — 93010 ELECTROCARDIOGRAM REPORT: CPT | Performed by: INTERNAL MEDICINE

## 2021-03-17 PROCEDURE — G0378 HOSPITAL OBSERVATION PER HR: HCPCS

## 2021-03-17 PROCEDURE — 99226 PR SBSQ OBSERVATION CARE/DAY 35 MINUTES: CPT | Performed by: INTERNAL MEDICINE

## 2021-03-17 PROCEDURE — 93306 TTE W/DOPPLER COMPLETE: CPT | Performed by: INTERNAL MEDICINE

## 2021-03-17 PROCEDURE — 93306 TTE W/DOPPLER COMPLETE: CPT

## 2021-03-17 PROCEDURE — 96366 THER/PROPH/DIAG IV INF ADDON: CPT

## 2021-03-17 PROCEDURE — 80048 BASIC METABOLIC PNL TOTAL CA: CPT | Performed by: NURSE PRACTITIONER

## 2021-03-17 PROCEDURE — 85025 COMPLETE CBC W/AUTO DIFF WBC: CPT | Performed by: NURSE PRACTITIONER

## 2021-03-17 PROCEDURE — 99203 OFFICE O/P NEW LOW 30 MIN: CPT | Performed by: INTERNAL MEDICINE

## 2021-03-17 PROCEDURE — 83880 ASSAY OF NATRIURETIC PEPTIDE: CPT | Performed by: NURSE PRACTITIONER

## 2021-03-17 PROCEDURE — 84484 ASSAY OF TROPONIN QUANT: CPT | Performed by: NURSE PRACTITIONER

## 2021-03-17 RX ORDER — FLUTICASONE PROPIONATE 50 MCG
1 SPRAY, SUSPENSION (ML) NASAL 2 TIMES DAILY
COMMUNITY

## 2021-03-17 RX ORDER — OMEGA-3S/DHA/EPA/FISH OIL/D3 300MG-1000
400 CAPSULE ORAL DAILY
COMMUNITY
End: 2021-04-20

## 2021-03-17 RX ADMIN — ATORVASTATIN CALCIUM 80 MG: 40 TABLET, FILM COATED ORAL at 08:48

## 2021-03-17 RX ADMIN — PRASUGREL 10 MG: 10 TABLET, FILM COATED ORAL at 08:56

## 2021-03-17 RX ADMIN — ESCITALOPRAM OXALATE 10 MG: 10 TABLET ORAL at 08:48

## 2021-03-17 RX ADMIN — ACETAMINOPHEN 650 MG: 325 TABLET ORAL at 15:34

## 2021-03-17 RX ADMIN — METOPROLOL TARTRATE 25 MG: 25 TABLET, FILM COATED ORAL at 20:58

## 2021-03-17 RX ADMIN — ASPIRIN 81 MG: 81 TABLET, CHEWABLE ORAL at 08:48

## 2021-03-17 RX ADMIN — ACETAMINOPHEN 650 MG: 325 TABLET ORAL at 09:02

## 2021-03-17 RX ADMIN — ACETAMINOPHEN 650 MG: 325 TABLET ORAL at 01:12

## 2021-03-17 NOTE — PLAN OF CARE
Problem: Adult Inpatient Plan of Care  Goal: Plan of Care Review  3/17/2021 1827 by Julieta Jones RN  Outcome: Ongoing, Progressing  Flowsheets  Taken 3/17/2021 1827 by Julieta Jones RN  Progress: improving  Outcome Summary: pt has remained chest pain free this shift, nitro gtt no longer infusing. has c/o headache, PRN tylenol given. VSS. plan is home tomorrow.  Taken 3/16/2021 2103 by Peggy Kinney RN  Plan of Care Reviewed With: patient  3/17/2021 1618 by Julieta Jones RN  Outcome: Ongoing, Progressing  Goal: Patient-Specific Goal (Individualized)  3/17/2021 1827 by Julieta Jones RN  Outcome: Ongoing, Progressing  3/17/2021 1618 by Julieta Jones RN  Outcome: Ongoing, Progressing  Goal: Absence of Hospital-Acquired Illness or Injury  3/17/2021 1827 by Julieta Jones RN  Outcome: Ongoing, Progressing  3/17/2021 1618 by Julieta Jones RN  Outcome: Ongoing, Progressing  Intervention: Identify and Manage Fall Risk  Description: Perform standard risk assessment with a validated tool or comprehensive approach appropriate to the patient on admission; reassess fall risk frequently, with change in status or transfer to another level of care.  Communicate fall injury risk to interprofessional healthcare team.  Determine need for increased observation, equipment and environmental modification, such as low bed and signage, as well as supportive, nonskid footwear.  Adjust safety measures to individual developmental age, stage and identified risk factors.  Reinforce the importance of safety and physical activity with patient and family.  Perform regular intentional rounding to assess need for position change, pain assessment, personal needs, including assistance with toileting.  Recent Flowsheet Documentation  Taken 3/17/2021 1200 by Julieta Jones RN  Safety Promotion/Fall Prevention:   activity supervised   fall prevention program maintained   nonskid shoes/slippers when out of bed   safety round/check  completed  Taken 3/17/2021 0800 by Julieta Jones RN  Safety Promotion/Fall Prevention:   activity supervised   fall prevention program maintained   nonskid shoes/slippers when out of bed   safety round/check completed  Intervention: Prevent Skin Injury  Description: Assess skin risk on admission and at regular intervals throughout hospital stay.  Keep all areas of skin (especially folds) clean and dry.  Maintain adequate skin hydration.  Relieve and redistribute pressure and protect bony prominences; implement measures based on patient-specific risk factors.  Match turning and repositioning schedule to clinical condition.  Encourage weight shift frequently; assist with reposition if unable to complete independently.  Float heels off bed. Avoid pressure on the Achilles tendon.  Keep skin free from extended contact with medical devices.  Use aids (e.g., slide boards, mechanical lift) during transfer.  Recent Flowsheet Documentation  Taken 3/17/2021 1200 by Julieta Jones RN  Body Position: position changed independently  Taken 3/17/2021 0800 by Julieta Jones RN  Body Position: position changed independently  Goal: Optimal Comfort and Wellbeing  3/17/2021 1827 by Julieta Jones RN  Outcome: Ongoing, Progressing  3/17/2021 1618 by Julieta Jones RN  Outcome: Ongoing, Progressing  Intervention: Provide Person-Centered Care  Description: Use a family-focused approach to care.  Develop trust and rapport by proactively providing information, encouraging questions, addressing concerns and offering reassurance.  Acknowledge emotional response to hospitalization.  Recognize and utilize personal coping strategies.  Honor spiritual and cultural preferences.  Recent Flowsheet Documentation  Taken 3/17/2021 1200 by Julieta Jones RN  Trust Relationship/Rapport:   care explained   choices provided  Goal: Readiness for Transition of Care  3/17/2021 1827 by Julieta Jones RN  Outcome: Ongoing, Progressing  3/17/2021 1618 by  Julieta Jones, RN  Outcome: Ongoing, Progressing   Goal Outcome Evaluation:     Progress: improving  Outcome Summary: pt has remained chest pain free this shift, nitro gtt no longer infusing. has c/o headache, PRN tylenol given. VSS. plan is home tomorrow.

## 2021-03-17 NOTE — CONSULTS
[facility]  CARDIOLOGY CONSULTATION NOTE    Souleymane Grossman  : 1985  MRN:[id number]  Home Phone:[phone]    Date of Admission:3/16/2021  Date of Consultation: 21    PCP: Rogelio Yanes MD    IDENTIFICATION: A 35 y.o. female resident of [place], [place]     Chief Complaint   Patient presents with   • Chest Pain     PROBLEM LIST:   1. Coronary artery disease  a. NSTEMI 2021 [facility]. [facility] 21 Dr. Genao: single vessel circumflex disease, s/p THAIS  c. Echo 21: normal LVEF 55-60%, mild inferior wall hypkinesis, no valvular abnormalities   d. [facility] admission with recurrent angina at rest, 3/16/21, ruled out  2. Hypertension   3. Dyslipidemia  4. Tobacco abuse  5. Morbid obesity  6. History of gestational diabetes  7. Anxiety   8. ADHD    ALLERGIES:   Allergies   Allergen Reactions   • Bactrim [Sulfamethoxazole-Trimethoprim] Rash       HOME MEDICINES:   Current Outpatient Medications   Medication Instructions   • ALPRAZolam (XANAX) 1 MG tablet No dose, route, or frequency recorded.   • amphetamine-dextroamphetamine (ADDERALL) 20 MG tablet No dose, route, or frequency recorded.   • atorvastatin (LIPITOR) 80 MG tablet No dose, route, or frequency recorded.   • CVS Aspirin Adult Low Dose 81 MG chewable tablet No dose, route, or frequency recorded.   • escitalopram (LEXAPRO) 10 mg, Oral, Daily   • metoprolol tartrate (LOPRESSOR) 25 mg, Oral, 2 Times Daily   • prasugrel (EFFIENT) 10 mg, Oral, Daily     HPI: Mrs. Grossman is a pleasant obese 36 y/o WF smoker with above noted history who is seen in consultation today for chest pain. She has recently suffered a NSTEMI last month, and this was treated in [place] with THAIS to single vessel disease involving the circumflex artery. She has normal LV function. She has been doing well since her intervention without any symptoms until last night. She reports acute onset of midsternal chest pressure with  "radiation to her jaws, similar to her prior angina at the time of MI. This occurred at rest and lasted for several hours. She took SL Nitro and this improved her pain so she became concerned and came to our hospital for further evaluation. Her troponins have remained negative x 5, EKG shows no acute ST changes, and her proBNP is normal. She was started on Nitro gtt and her symptoms subsided soon after and have not returned. She also reports waking up yesterday with \"swelling everywhere\" and states she gained 12 lbs overnight. Her proBNP is normal, and she does not have any clinical evidence of heart failure. She is trying to cut back on smoking, and has recently purchased a vape pen in this regard.       ROS: All systems have been reviewed and are negative with the exception of those mentioned in the HPI and problem list above.    Surgical History:   Past Surgical History:   Procedure Laterality Date   • COLPOSCOPY     • CRYOTHERAPY      cervix   • CYSTOSCOPY BLADDER STONE LITHOTRIPSY      kidney stones, h/o stent placement   • DILATATION AND CURETTAGE      08/2020   • ENDOMETRIAL ABLATION  08/2020   • HYSTEROSCOPY  08/2020   • LAPAROSCOPIC TUBAL LIGATION     • WISDOM TOOTH EXTRACTION         Social History:   Social History     Socioeconomic History   • Marital status:    Tobacco Use   • Smoking status: Current Every Day Smoker     Packs/day: 0.50     Types: Cigarettes   • Smokeless tobacco: Never Used   • Tobacco comment: smoked x 15 yrs, down to 1/2 ppd now    Vaping Use   • Vaping Use: Some days   • Substances: Nicotine, THC   Substance and Sexual Activity   • Alcohol use: Yes     Alcohol/week: 2.0 standard drinks     Types: 2 Glasses of wine per week     Comment: approx 2-3 drinks a month    • Drug use: Yes     Types: Marijuana     Comment: via vapes    • Sexual activity: Defer     Family History:   Family History   Problem Relation Age of Onset   • Hypertension Father    • Diabetes Father    • Diabetes " "Mother    • Anxiety disorder Mother    • Cancer Mother    • Early death Mother    • Ovarian cancer Maternal Grandmother    • Diabetes Brother      Objective     /61 (BP Location: Left arm, Patient Position: Lying)   Pulse 92   Temp 98.2 °F (36.8 °C) (Oral)   Resp 16   Ht 162.6 cm (64\")   Wt 112 kg (246 lb)   SpO2 96%   BMI 42.23 kg/m²     Intake/Output Summary (Last 24 hours) at 3/17/2021 0915  Last data filed at 3/17/2021 0240  Gross per 24 hour   Intake 8.4 ml   Output --   Net 8.4 ml       PHYSICAL EXAM:  CONSTITUTIONAL: Obese, cooperative, in no acute distress  HEENT: Normocephalic, atraumatic, PERRLA, no JVD  CARDIOVASCULAR:  Regular rhythm and normal rate, no murmur, gallop, rub.   RESPIRATORY: Clear to auscultation, normal respiratory effort, no wheezing, rales or rhonchi  GI: Soft, nontender, normal bowel sounds  EXTREMITIES: No gross deformities, no edema. Peripheral pulses are present and equal bilaterally  SKIN: Warm, dry. No bleeding, bruising or rash  NEUROLOGICAL: No focal deficits  PSYCHIATRIC: Normal mood and affect. Behavior is normal     Labs/Diagnostic Data  Results from last 7 days   Lab Units 03/17/21  0758 03/16/21  1710   SODIUM mmol/L 138 141   POTASSIUM mmol/L 3.7 3.9   CHLORIDE mmol/L 102 105   CO2 mmol/L 27.0 28.0   BUN mg/dL 16 9   CREATININE mg/dL 0.59 0.61   GLUCOSE mg/dL 98 115*   CALCIUM mg/dL 8.6 8.7     Results from last 7 days   Lab Units 03/17/21  0758 03/17/21  0045 03/16/21  2053   TROPONIN T ng/mL <0.010 <0.010 <0.010     Results from last 7 days   Lab Units 03/17/21  0758 03/16/21  1710   WBC 10*3/mm3 9.32 8.88   HEMOGLOBIN g/dL 12.4 13.2   HEMATOCRIT % 37.7 39.9   PLATELETS 10*3/mm3 235 263         Results from last 7 days   Lab Units 03/17/21  0758   PROBNP pg/mL 154.3     Results from last 7 days   Lab Units 03/17/21  0758   PROTIME Seconds 13.4   INR  1.05       I personally reviewed the patient's EKG/Telemetry data    Radiology Data:   CXR " 3/16/21:  IMPRESSION:  Mild pulmonary vascular congestion without overt edema or  other acute cardiopulmonary abnormality.      Current Medications:    aspirin, 81 mg, Oral, Daily  atorvastatin, 80 mg, Oral, Daily  escitalopram, 10 mg, Oral, Daily  metoprolol tartrate, 25 mg, Oral, BID  nicotine, 1 patch, Transdermal, Q24H  prasugrel, 10 mg, Oral, Daily  sodium chloride, 10 mL, Intravenous, Q12H      nitroglycerin, 10-50 mcg/min, Last Rate: Stopped (03/17/21 0240)        Assessment and Plan:     1. CAD with chest pain  - symptoms similar to her prior angina, resolved with Nitro  - troponins negative x5, and EKGs with no acute ST changes  - Echo normal with no segmental wall motion abnormalities.  - OSH cath intervention films reviewed. A focal circumflex lesion was treated appropriately with very nice result.  - I feel that is is extremely  unlikely that symptoms at admission reflect myocardial ischemia.   - tentative home in AM    2. HLD  - continue Lipitor 80mg  - check lipid panel    3. Tobacco abuse  - encouraged complete tobacco and vaping products cessation      Thank you for allowing me to participate in the care of Souleymane Grossman. Feel free to contact me directly with any further questions or concerns.      Scribed for Juan Diego Oneill MD by Bhumi Turner PA-C. 3/17/2021  11:03 EDT

## 2021-03-17 NOTE — PROGRESS NOTES
[facility] Medicine Services  PROGRESS NOTE    Patient Name: Souleymane Grossman  : 1985  MRN: [id number]    Date of Admission: 3/16/2021  Primary Care Physician: Rogelio Yanes MD    Subjective   Subjective     CC:  Follow up chest pain    HPI:  Currently not having chest pain, denies palpitations. Has been off nitro drip.     ROS:  Gen- No fevers, chills  Resp- No cough, dyspnea  GI- No N/V/D, abd pain     Objective   Objective     Vital Signs:   Temp:  [97.7 °F (36.5 °C)-98.5 °F (36.9 °C)] 97.9 °F (36.6 °C)  Heart Rate:  [61-95] 65  Resp:  [16-18] 16  BP: ()/(51-88) 132/85        Physical Exam:  Constitutional: No acute distress, awake, alert  HENT: NCAT, mucous membranes moist  Respiratory: Clear to auscultation bilaterally, respiratory effort normal on room air   Cardiovascular: RRR, no murmurs  Gastrointestinal: Positive bowel sounds, soft, nontender, nondistended  Psychiatric: Appropriate affect, cooperative  Neurologic: Oriented x 3, Cranial Nerves grossly intact to confrontation, speech clear  Skin: No rashes    Results Reviewed:  Results from last 7 days   Lab Units 21  0758 21  1710   WBC 10*3/mm3 9.32 8.88   HEMOGLOBIN g/dL 12.4 13.2   HEMATOCRIT % 37.7 39.9   PLATELETS 10*3/mm3 235 263   INR  1.05  --      Results from last 7 days   Lab Units 21  0758 21  0045 21  1710   SODIUM mmol/L 138  --   --  141   POTASSIUM mmol/L 3.7  --   --  3.9   CHLORIDE mmol/L 102  --   --  105   CO2 mmol/L 27.0  --   --  28.0   BUN mg/dL 16  --   --  9   CREATININE mg/dL 0.59  --   --  0.61   GLUCOSE mg/dL 98  --   --  115*   CALCIUM mg/dL 8.6  --   --  8.7   ALT (SGPT) U/L  --   --   --  20   AST (SGOT) U/L  --   --   --  20   TROPONIN T ng/mL <0.010 <0.010 <0.010 <0.010   PROBNP pg/mL 154.3  --   --  335.4     Estimated Creatinine Clearance: 158.6 mL/min (by C-G formula based on SCr of 0.59 mg/dL).    Microbiology Results  Abnormal     Procedure Component Value - Date/Time    COVID-19,CEPHEID,MARINE IN-HOUSE(OR EMERGENT/ADD-ON),NP SWAB IN TRANSPORT MEDIA 3-4 HR TAT - Swab, Nasopharynx [[id number]]  (Normal) Collected: 03/16/21 2022    Lab Status: Final result Specimen: Swab from Nasopharynx Updated: 03/16/21 2135     COVID19 Not Detected    Narrative:      Fact sheet for providers: https://www.fda.gov/media/494245/download     Fact sheet for patients: https://www.fda.gov/media/928583/download          Imaging Results (Last 24 Hours)     Procedure Component Value Units Date/Time    US Outside Films [[id number]] Resulted: 03/16/21 1836     Updated: 03/16/21 1836    Narrative:      This procedure was auto-finalized with no dictation required.    XR Chest 1 View [[id number]] Collected: 03/16/21 1759     Updated: 03/16/21 1816    Narrative:      EXAMINATION: XR CHEST 1 VW-      INDICATION: Chest Pain triage protocol      COMPARISON: 12/12/2019     FINDINGS: There is mild cardiac enlargement and pulmonary vascular  engorgement, without overt edema. No focal lobar consolidation. No  significant pleural effusion or distinct pneumothorax.       Impression:      Mild pulmonary vascular congestion without overt edema or  other acute cardiopulmonary abnormality.     This report was finalized on 3/16/2021 6:13 PM by Du Ross.                 I have reviewed the medications:  Scheduled Meds:aspirin, 81 mg, Oral, Daily  atorvastatin, 80 mg, Oral, Daily  escitalopram, 10 mg, Oral, Daily  metoprolol tartrate, 25 mg, Oral, BID  nicotine, 1 patch, Transdermal, Q24H  prasugrel, 10 mg, Oral, Daily  sodium chloride, 10 mL, Intravenous, Q12H      Continuous Infusions:nitroglycerin, 10-50 mcg/min, Last Rate: Stopped (03/17/21 0240)      PRN Meds:.•  acetaminophen **OR** acetaminophen **OR** acetaminophen  •  sodium chloride  •  sodium chloride    Assessment/Plan   Assessment & Plan     Active Hospital Problems    Diagnosis  POA   • **Chest pain [R07.9]   Yes   • Essential hypertension [I10]  Unknown   • Marijuana use [F12.90]  Unknown   • CAD (coronary artery disease) [I25.10]  Unknown   • History of MI (myocardial infarction) [I25.2]  Not Applicable   • Anxiety [F41.9]  Yes   • Attention deficit hyperactivity disorder (ADHD) [F90.9]  Yes   • Tobacco use [Z72.0]  Yes   • Morbid obesity due to excess calories (CMS/HCC) [E66.01]  Yes      Resolved Hospital Problems   No resolved problems to display.        Brief Hospital Course to date:  Souleymane Grossman is a 35 y.o. female With a PMH of gestational diabetes, anxiety, ADHD, HTN, HLD, endometriosis, obesity, tobacco abuse, occasional marijuana use, and recently diagnosed CAD s/p NSTEMI who was admitted on 3/16/2021 for chest pain. She had a recent THAIS placed to left circumflex on 2/19/101 at [facility] and was started on aspirin, gabapentin, statin, and beta blocker.     Patient and problems new to me today     Chest pain  CAD s/p NSTEMI  Recent PCI   -started on nitro drip - has been stopped and chest pain has not recurred  -troponin negative x5  -echo ordered  -cards consult   -continue asa/statin/effient and metoprolol     Anxiety/ADHD  -continue holding adderall   -xanax prn     Tobacco use   Intermittent marijuana vaping  -counseled on smoking cessation   -continue nicotine patch     DVT Prophylaxis:  scd       Disposition: I expect the patient to be discharged home later today or early tomorrow pending cardiology recommendations.    CODE STATUS:   Code Status and Medical Interventions:   Ordered at: 03/16/21 1956     Level Of Support Discussed With:    Patient     Code Status:    CPR     Medical Interventions (Level of Support Prior to Arrest):    Full       Mirela iLm MD  03/17/21

## 2021-03-17 NOTE — ED PROVIDER NOTES
Subjective   Pt presents with chest pain.  Woke up today with swelling to hands, feet and face.  She says she has gained 12# in two weeks.  She began having midsternal chest pressure several hours ago.  It began to radiate to the jaw, and she has had nausea with it.  She took NTG with partial, transient relief.  No dizziness or dyspnea.    She says this feels similar to her MI.  In Feb she apparently had NSTEMI and was treated at [facility].  She had cath and echo, the reports of which I reviewed.  She was stented and started on DAPT plus metoprolol and statin. She reports compliance with all meds.      History provided by:  Patient      Review of Systems   Respiratory: Negative for shortness of breath.    Cardiovascular: Positive for chest pain and leg swelling.   Gastrointestinal: Positive for nausea.   Neurological: Negative for dizziness.   All other systems reviewed and are negative.      Past Medical History:   Diagnosis Date   • Abnormal Pap smear of cervix    • ADHD (attention deficit hyperactivity disorder)    • Anxiety     PCP manages   • Anxiety    • Blood in urine    • CAD (coronary artery disease) 3/16/2021   • Chills    • Chronic headaches    • Endometriosis    • Essential hypertension 3/16/2021   • Fatigue    • Gestational diabetes 2017    took oral meds in 2014 pregnancy; recently dx in 2017 pregnancy    • Heart attack (CMS/[facility])     02/19/2021   • History of MI (myocardial infarction) 3/16/2021    S/p NSTEMI 2/18/2021 with cath and THAIS to circumflex on 2/19/2021   • Kidney stones    • Low back pain    • Marijuana use 3/16/2021    occ vapes marijuana    • Mood changes    • Nausea    • Night sweats    • Obesity, Class III, BMI 40-49.9 (morbid obesity) (CMS/HCC)    • Pelvic pain    • Urinary incontinence    • Weight gain        Allergies   Allergen Reactions   • Bactrim [Sulfamethoxazole-Trimethoprim] Rash       Past Surgical History:   Procedure Laterality Date   • COLPOSCOPY     • CORONARY ANGIOPLASTY  WITH STENT PLACEMENT  02/2021   • CRYOTHERAPY      cervix   • CYSTOSCOPY BLADDER STONE LITHOTRIPSY  02/2017    reports has 2 remaining stones   • CYSTOSCOPY BLADDER STONE LITHOTRIPSY      kidney stones, h/o stent placement   • DILATATION AND CURETTAGE      08/2020   • ENDOMETRIAL ABLATION  08/2020   • HYSTEROSCOPY  08/2020   • LAPAROSCOPIC TUBAL LIGATION     • WISDOM TOOTH EXTRACTION     • WISDOM TOOTH EXTRACTION         Family History   Problem Relation Age of Onset   • Hypertension Father    • Anxiety disorder Father    • Arthritis Father    • Mental illness Father    • Diabetes Father    • Diabetes Mother    • Anxiety disorder Mother    • Arthritis Mother    • Cancer Mother    • Early death Mother    • Cancer Maternal Grandmother    • Ovarian cancer Maternal Grandmother    • Diabetes Brother    • Vision loss Brother    • Colon cancer Other    • No Known Problems Daughter    • No Known Problems Brother    • No Known Problems Brother    • No Known Problems Daughter    • Breast cancer Neg Hx    • Uterine cancer Neg Hx        Social History     Socioeconomic History   • Marital status:      Spouse name: Not on file   • Number of children: Not on file   • Years of education: Not on file   • Highest education level: Not on file   Tobacco Use   • Smoking status: Current Every Day Smoker     Packs/day: 0.50     Types: Cigarettes   • Smokeless tobacco: Never Used   • Tobacco comment: smoked x 15 yrs, down to 1/2 ppd now    Vaping Use   • Vaping Use: Some days   • Substances: Nicotine, THC   Substance and Sexual Activity   • Alcohol use: Yes     Alcohol/week: 2.0 standard drinks     Types: 2 Glasses of wine per week     Comment: approx 2-3 drinks a month    • Drug use: Yes     Types: Marijuana     Comment: via vapes    • Sexual activity: Defer           Objective   Physical Exam  Vitals and nursing note reviewed.   Constitutional:       General: She is not in acute distress.     Appearance: Normal appearance. She is  not ill-appearing.   HENT:      Head: Normocephalic and atraumatic.      Mouth/Throat:      Mouth: Mucous membranes are moist.   Eyes:      General: No scleral icterus.        Right eye: No discharge.         Left eye: No discharge.      Conjunctiva/sclera: Conjunctivae normal.   Cardiovascular:      Rate and Rhythm: Normal rate and regular rhythm.      Heart sounds: No murmur.   Pulmonary:      Effort: Pulmonary effort is normal. No respiratory distress.      Breath sounds: Normal breath sounds. No wheezing.   Abdominal:      General: Bowel sounds are normal. There is no distension.      Palpations: Abdomen is soft.      Tenderness: There is no abdominal tenderness. There is no guarding or rebound.   Musculoskeletal:         General: No swelling. Normal range of motion.      Cervical back: Normal range of motion and neck supple.      Right lower leg: Edema present.      Left lower leg: Edema present.   Skin:     General: Skin is warm and dry.      Findings: No rash.   Neurological:      General: No focal deficit present.      Mental Status: She is alert. Mental status is at baseline.   Psychiatric:         Mood and Affect: Mood normal.         Behavior: Behavior normal.         Thought Content: Thought content normal.         Procedures           ED Course         EKG NSR, RAD.  Troponin negative.  Labs benign.  CXR negative.       HEART score 4.  I reviewed her cath and echo.  She had mild inferior wall hypokinesia but overall normal LV function and EF 55-60%.  She had cath showing 90% circ lesion stented to 0% and otherwise LI only.    She does not appear to have stent occlusion today but she is having her anginal equivalent and I will admit for cardiology evaluation.                             MDM  Number of Diagnoses or Management Options     Amount and/or Complexity of Data Reviewed  Clinical lab tests: reviewed and ordered  Tests in the radiology section of CPT®: reviewed and ordered  Decide to obtain  previous medical records or to obtain history from someone other than the patient: yes  Review and summarize past medical records: yes  Discuss the patient with other providers: yes  Independent visualization of images, tracings, or specimens: yes        Final diagnoses:   Unstable angina (CMS/HCC)       ED Disposition  ED Disposition     ED Disposition Condition Comment    Decision to Admit  Level of Care: Telemetry [5]   Diagnosis: Chest pain [480982]   Bed Request Comments: noncovid tele            No follow-up provider specified.       Medication List      No changes were made to your prescriptions during this visit.          Saturnino Andrade MD  03/17/21 0104

## 2021-03-17 NOTE — PROGRESS NOTES
Discharge Planning Assessment  [facility]     Patient Name: Souleymane Grossman  MRN: [id number]  Today's Date: 3/17/2021    Admit Date: 3/16/2021    Discharge Needs Assessment     Row Name 03/17/21 0946       Living Environment    Lives With  spouse    Name(s) of Who Lives With Patient  Spouse at the bedside.  He states they also have 2 children.    Unique Family Situation  Her brother is listed as next of kin.    Current Living Arrangements  home/apartment/condo    Primary Care Provided by  self    Provides Primary Care For  child([place])    Family Caregiver if Needed  sibling(s);spouse    Quality of Family Relationships  helpful;involved;supportive    Able to Return to Prior Arrangements  yes    Living Arrangement Comments  Resides in [place], [place]       Resource/Environmental Concerns    Resource/Environmental Concerns  none       Transition Planning    Patient/Family Anticipates Transition to  home with family    Patient/Family Anticipated Services at Transition  none    Transportation Anticipated  family or friend will provide       Discharge Needs Assessment    Readmission Within the Last 30 Days  no previous admission in last 30 days    Equipment Currently Used at Home  none    Concerns to be Addressed  no discharge needs identified    Anticipated Changes Related to Illness  none    Equipment Needed After Discharge  none    Provided Post Acute Provider List?  N/A    Provided Post Acute Provider Quality & Resource List?  N/A    Discharge Coordination/Progress  Met with Ms. Grossman and spouse at the bedside.  She was admitted for chest pains.  Resides in [facility]. PCP is Rogelio Yanes who she visits regularly.  States she tries to work but has been ill off and on.  No Adv. directives;  Insurance is [facility].  States copays and deductibles have been manageable.  Ms. Grossman has a sleep study planned as she anticipates needing a CPAP.  No dc needs identified at this time.  Plans for dc home with spouse.  CM to follow.  Drake CARROLL ext [phone]        Discharge Plan     Row Name 03/17/21 0951       Plan    Plan  No dc needs identified at this time.  Plans for [place] to [place] spouse.  CM to follow. Drake CARROLL ext [phone]    Provided Post Acute Provider List?  N/A    Provided Post Acute Provider Quality & Resource List?  N/A    Patient/Family in Agreement with Plan  yes    Final Discharge Disposition Code  01 - home or self-care    Final Note  Plans for dc home.        Continued Care and Services - Admitted Since 3/16/2021    Coordination has not been started for this encounter.         Demographic Summary    No documentation.       Functional Status     Row Name 03/17/21 0944       Functional Status    Usual Activity Tolerance  good    Current Activity Tolerance  good       Functional Status, IADL    Medications  independent    Meal Preparation  independent    Housekeeping  independent    Laundry  independent    Shopping  independent       Mental Status    General Appearance WDL  WDL       Mental Status Summary    Recent Changes in Mental Status/Cognitive Functioning  no changes       Employment/    Employment Status  other (see comments) States she works when she can.        Psychosocial     Row Name 03/17/21 0944       Values/Beliefs    Spiritual, Cultural Beliefs, Denominational Practices, Values that Affect Care  no       Behavior WDL    Behavior WDL  WDL       Emotion Mood WDL    Emotion/Mood/Affect WDL  WDL       Speech WDL    Speech WDL  WDL       Perceptual State WDL    Perceptual State WDL  WDL       Thought Process WDL    Thought Process WDL  WDL       Intellectual Performance WDL    Intellectual Performance WDL  WDL    Level of Consciousness  Alert       Coping/Stress    Major Change/Loss/Stressor  denies    Patient Personal Strengths  able to adapt    Sources of Support  spouse    Reaction to Health Status  accepting    Understanding of Condition and Treatment  adequate understanding of medical condition        Developmental Stage (Eriksson's)    Developmental Stage  Stage 7 (35-65 years/Middle Adulthood) Generativity vs. Stagnation       C-SSRS (Recent)    Q1 Wished to be Dead (Past Month)  no    Q2 Suicidal Thoughts (Past Month)  no    Q6 Suicide Behavior (Lifetime)  no       Violence Risk    Feels Like Hurting Others  no    Previous Attempt to Harm Others  no        Abuse/Neglect    No documentation.       Legal    No documentation.       Substance Abuse    No documentation.       Patient Forms    No documentation.           Radha May RN

## 2021-03-18 VITALS
DIASTOLIC BLOOD PRESSURE: 70 MMHG | HEIGHT: 63 IN | HEART RATE: 69 BPM | SYSTOLIC BLOOD PRESSURE: 109 MMHG | WEIGHT: 246 LBS | OXYGEN SATURATION: 94 % | RESPIRATION RATE: 16 BRPM | TEMPERATURE: 98 F | BODY MASS INDEX: 43.59 KG/M2

## 2021-03-18 LAB
CHOLEST SERPL-MCNC: 140 MG/DL (ref 0–200)
HDLC SERPL-MCNC: 54 MG/DL (ref 40–60)
LDLC SERPL CALC-MCNC: 65 MG/DL (ref 0–100)
LDLC/HDLC SERPL: 1.15 {RATIO}
TRIGL SERPL-MCNC: 119 MG/DL (ref 0–150)
TROPONIN T SERPL-MCNC: <0.01 NG/ML (ref 0–0.03)
VLDLC SERPL-MCNC: 21 MG/DL (ref 5–40)

## 2021-03-18 PROCEDURE — 84484 ASSAY OF TROPONIN QUANT: CPT | Performed by: NURSE PRACTITIONER

## 2021-03-18 PROCEDURE — 99217 PR OBSERVATION CARE DISCHARGE MANAGEMENT: CPT | Performed by: NURSE PRACTITIONER

## 2021-03-18 PROCEDURE — 99213 OFFICE O/P EST LOW 20 MIN: CPT | Performed by: INTERNAL MEDICINE

## 2021-03-18 PROCEDURE — 80061 LIPID PANEL: CPT | Performed by: PHYSICIAN ASSISTANT

## 2021-03-18 PROCEDURE — G0378 HOSPITAL OBSERVATION PER HR: HCPCS

## 2021-03-18 RX ORDER — FLUTICASONE PROPIONATE 50 MCG
2 SPRAY, SUSPENSION (ML) NASAL DAILY
Status: DISCONTINUED | OUTPATIENT
Start: 2021-03-18 | End: 2021-03-18 | Stop reason: HOSPADM

## 2021-03-18 RX ORDER — NITROGLYCERIN 0.4 MG/1
0.4 TABLET SUBLINGUAL
Qty: 25 TABLET | Refills: 11 | Status: SHIPPED | OUTPATIENT
Start: 2021-03-18

## 2021-03-18 RX ORDER — NICOTINE 21 MG/24HR
1 PATCH, TRANSDERMAL 24 HOURS TRANSDERMAL EVERY 24 HOURS
Qty: 14 PATCH | Refills: 0 | Status: SHIPPED | OUTPATIENT
Start: 2021-03-19 | End: 2021-04-20

## 2021-03-18 RX ADMIN — ESCITALOPRAM OXALATE 10 MG: 10 TABLET ORAL at 09:11

## 2021-03-18 RX ADMIN — Medication 1 PATCH: at 09:16

## 2021-03-18 RX ADMIN — METOPROLOL TARTRATE 25 MG: 25 TABLET, FILM COATED ORAL at 09:10

## 2021-03-18 RX ADMIN — ASPIRIN 81 MG: 81 TABLET, CHEWABLE ORAL at 09:11

## 2021-03-18 RX ADMIN — ATORVASTATIN CALCIUM 80 MG: 40 TABLET, FILM COATED ORAL at 09:10

## 2021-03-18 RX ADMIN — PRASUGREL 10 MG: 10 TABLET, FILM COATED ORAL at 09:11

## 2021-03-18 NOTE — PROGRESS NOTES
"  [facility]  PROGRESS NOTE    Date of Admission: 3/16/2021  Date of Service: 03/18/21    Primary Care Physician: Rogelio Yanes MD    Chief Complaint: f/u CAD with chest pain   Problem List:   1. Coronary artery disease  a. NSTEMI 02/2021 [facility] 2/19/21 Dr. Genao: single vessel circumflex disease, s/p THAIS  c. Echo 2/19/21: normal LVEF 55-60%, mild inferior wall hypkinesis, no valvular abnormalities   d. [facility] admission with recurrent angina at rest, 3/16/21, ruled out  e. Echo 3/17/21: normal LVEF, global and segmental wall motion is normal, no valvular abnormalities   2. Hypertension   3. Dyslipidemia  4. Tobacco abuse  5. Morbid obesity  6. History of gestational diabetes  7. Anxiety   8. ADHD    Subjective      Asymptomatic overnight without recurrent chest pain or discomfort. Questions answered.     Objective   Vitals: /62 (BP Location: Left arm, Patient Position: Lying)   Pulse 69   Temp 98.4 °F (36.9 °C) (Oral)   Resp 14   Ht 160 cm (62.99\")   Wt 112 kg (246 lb)   SpO2 94%   BMI 43.59 kg/m²     Physical Exam:  GENERAL: Alert, cooperative, in no acute distress. Overweight.  HEENT: Normocephalic, no jugular venous distention  HEART: Regular rhythm, normal rate, and no murmurs, gallops, or rubs.   LUNGS: Clear to auscultation bilaterally. No wheezing, rales or rhonchi.  ABDOMEN: Soft, bowel sounds present, nontender   NEUROLOGIC: No focal abnormalities involving strength or sensation are noted.   EXTREMITIES: No clubbing, cyanosis, or edema noted.     Results:  Results from last 7 days   Lab Units 03/17/21  0758 03/16/21  1710   WBC 10*3/mm3 9.32 8.88   HEMOGLOBIN g/dL 12.4 13.2   HEMATOCRIT % 37.7 39.9   PLATELETS 10*3/mm3 235 263     Results from last 7 days   Lab Units 03/17/21  0758 03/16/21  1710   SODIUM mmol/L 138 141   POTASSIUM mmol/L 3.7 3.9   CHLORIDE mmol/L 102 105   CO2 mmol/L 27.0 28.0   BUN mg/dL 16 9   CREATININE " mg/dL 0.59 0.61   GLUCOSE mg/dL 98 115*      Lab Results   Component Value Date    CHOL 140 03/18/2021    TRIG 119 03/18/2021    HDL 54 03/18/2021    LDL 65 03/18/2021    AST 20 03/16/2021    ALT 20 03/16/2021       Results from last 7 days   Lab Units 03/17/21  0758   PROTIME Seconds 13.4   INR  1.05     Results from last 7 days   Lab Units 03/18/21  0002 03/17/21  0758 03/17/21  0045   TROPONIN T ng/mL <0.010 <0.010 <0.010     Results from last 7 days   Lab Units 03/17/21  0758   PROBNP pg/mL 154.3       Intake/Output Summary (Last 24 hours) at 3/18/2021 1049  Last data filed at 3/17/2021 1935  Gross per 24 hour   Intake 980 ml   Output --   Net 980 ml       I personally reviewed the patient's EKG/Telemetry data    Radiology Data:   Echo 3/17/21:  Interpretation Summary    · Cardiac chamber sizes are normal.  · Global and segmental LV wall motion is normal. Calculated left ventricular ejection fraction is 67%.  · There is NO evidence of asymmetric septal hypertrophy.  · The mitral and aortic valves are normal in structure and function.  · Left atrial volume is mildly increased.  · There is no evidence of pulmonary artery hypertension.  · There are no other important findings on this study.     Current Medications:  aspirin, 81 mg, Oral, Daily  atorvastatin, 80 mg, Oral, Daily  escitalopram, 10 mg, Oral, Daily  metoprolol tartrate, 25 mg, Oral, BID  nicotine, 1 patch, Transdermal, Q24H  prasugrel, 10 mg, Oral, Daily  sodium chloride, 10 mL, Intravenous, Q12H      nitroglycerin, 10-50 mcg/min, Last Rate: Stopped (03/17/21 0240)        Assessment and Plan:     1. CAD with chest pain  - symptoms similar to her prior angina, resolved with Nitro  - troponins negative x5, and EKGs with no acute ST changes  - Echo normal with no segmental wall motion abnormalities.  - OSH cath intervention films reviewed. A focal circumflex lesion was treated appropriately with very nice result.  - I feel that is is extremely  unlikely  "that symptoms at admission reflect myocardial ischemia.   - has remained asymptomatic overnight  - Admitting symptoms were non-cardiac; she is \"OK\" for discharge this morning.  - I would not change medications. She does not need home lasix. SMOKING CESSATION discussed and encouraged at length ( this INCLUDES Vaping).      2. HLD  - continue Lipitor 80mg  - LDL 65     3. Tobacco abuse  - encouraged complete tobacco and vaping products cessation      Electronically signed by Bhumi Turner PA-C, 03/18/21, 9:25 AM EDT.    "

## 2021-03-18 NOTE — DISCHARGE SUMMARY
[facility] Medicine Services  DISCHARGE SUMMARY    Patient Name: Souleymane Grossman  : 1985  MRN: [id number]    Date of Admission: 3/16/2021  5:41 PM  Date of Discharge:  3/18/2021  Primary Care Physician: Rogelio Yanes MD    Consults     Date and Time Order Name Status Description    3/17/2021 12:35 AM Inpatient Cardiology Consult Completed           Hospital Course     Presenting Problem:   Chest pain [R07.9]    Active Hospital Problems    Diagnosis  POA   • **Chest pain [R07.9]  Yes   • Essential hypertension [I10]  Unknown   • Marijuana use [F12.90]  Unknown   • CAD (coronary artery disease) [I25.10]  Unknown   • History of MI (myocardial infarction) [I25.2]  Not Applicable   • Anxiety [F41.9]  Yes   • Attention deficit hyperactivity disorder (ADHD) [F90.9]  Yes   • Tobacco use [Z72.0]  Yes   • Morbid obesity due to excess calories (CMS/[facility]) [E66.01]  Yes      Resolved Hospital Problems   No resolved problems to display.          Hospital Course:  Souleymane Grossman is a 35 y.o. female With a PMH of gestational diabetes, anxiety, ADHD, HTN, HLD, endometriosis, obesity, tobacco abuse, occasional marijuana use, and recently diagnosed CAD s/p NSTEMI who was admitted on 3/16/2021 for chest pain. She had a recent THAIS placed to left circumflex on  at [facility] and was started on aspirin, gabapentin, statin, and beta blocker. she was followed by Cardiology while at hospital. She has had negative troponin and no further work up recommended.          Chest pain  CAD s/p NSTEMI  Recent PCI   -started on nitro drip - has been stopped and chest pain has not recurred  -troponin negative x5  -echo normal with no segmental wall motion abnormalities.  -cards following   -continue asa/statin/effient and metoprolol   -- Cards does not recommend any medication changes or further testing at this time.      Anxiety/ADHD  -continue holding adderall   -xanax prn       Tobacco use   Intermittent marijuana vaping  -counseled on smoking cessation   -continue nicotine patch at discharge      Patient has remained clinically stable and will be discharged home today.        Discharge Follow Up Recommendations for outpatient labs/diagnostics:   follow up with pcp one week  Follow up with Dr. Oneill 2-4 weeks     Day of Discharge     HPI:   Patient is up walking in room in NAD. She is tolerating diet. Had some twinges overnight per no CP. Feels ready to go home. No acute events overnight per nursing. Plan for home     Review of Systems  Gen- No fevers, chills  CV- No chest pain, palpitations  Resp- No cough, dyspnea  GI- No N/V/D, abd pain        Vital Signs:   Temp:  [97.8 °F (36.6 °C)-98.4 °F (36.9 °C)] 98 °F (36.7 °C)  Heart Rate:  [64-86] 69  Resp:  [14-16] 16  BP: (109-131)/(60-84) 109/70     Physical Exam:  Constitutional: No acute distress, awake, alert  HENT: NCAT, mucous membranes moist  Respiratory: Clear to auscultation bilaterally, respiratory effort normal room air   Cardiovascular: RRR, no murmurs, rubs, or gallops  Gastrointestinal: Positive bowel sounds, soft, nontender, nondistended  Musculoskeletal: trace generalized edema   Psychiatric: Appropriate affect, cooperative  Neurologic: Oriented x 3, strength symmetric in all extremities, Cranial Nerves grossly intact to confrontation, speech clear  Skin: No rashes      Pertinent  and/or Most Recent Results     LAB RESULTS:      Lab 03/17/21  0758 03/16/21  1710   WBC 9.32 8.88   HEMOGLOBIN 12.4 13.2   HEMATOCRIT 37.7 39.9   PLATELETS 235 263   NEUTROS ABS 6.02 5.60   IMMATURE GRANS (ABS) 0.04 0.03   LYMPHS ABS 2.44 2.56   MONOS ABS 0.55 0.49   EOS ABS 0.21 0.15   MCV 92.4 92.6   PROTIME 13.4  --          Lab 03/17/21  0758 03/16/21  1710   SODIUM 138 141   POTASSIUM 3.7 3.9   CHLORIDE 102 105   CO2 27.0 28.0   ANION GAP 9.0 8.0   BUN 16 9   CREATININE 0.59 0.61   GLUCOSE 98 115*   CALCIUM 8.6 8.7         Lab  03/16/21  1710   TOTAL PROTEIN 6.6   ALBUMIN 3.90   GLOBULIN 2.7   ALT (SGPT) 20   AST (SGOT) 20   BILIRUBIN 0.3   ALK PHOS 90   LIPASE 23         Lab 03/18/21  0002 03/17/21  0758 03/17/21  0045 03/16/21  2053 03/16/21  1905 03/16/21  1710   PROBNP  --  154.3  --   --   --  335.4   TROPONIN T <0.010 <0.010 <0.010 <0.010 <0.010 <0.010   PROTIME  --  13.4  --   --   --   --    INR  --  1.05  --   --   --   --          Lab 03/18/21  0002   CHOLESTEROL 140   LDL CHOL 65   HDL CHOL 54   TRIGLYCERIDES 119             Brief Urine Lab Results  (Last result in the past 365 days)      Color   Clarity   Blood   Leuk Est   Nitrite   Protein   CREAT   Urine HCG        03/16/21 2003               Negative         Microbiology Results (last 10 days)     Procedure Component Value - Date/Time    COVID-19,CEPHEID,MARINE IN-HOUSE(OR EMERGENT/ADD-ON),NP SWAB IN TRANSPORT MEDIA 3-4 HR TAT - Swab, Nasopharynx [[id number]]  (Normal) Collected: 03/16/21 2022    Lab Status: Final result Specimen: Swab from Nasopharynx Updated: 03/16/21 2135     COVID19 Not Detected    Narrative:      Fact sheet for providers: https://www.fda.gov/media/903706/download     Fact sheet for patients: https://www.fda.gov/media/402213/download          Adult Transthoracic Echo Complete W/ Cont if Necessary Per Protocol    Result Date: 3/17/2021  · Cardiac chamber sizes are normal. · Global and segmental LV wall motion is normal. Calculated left ventricular ejection fraction is 67%. · There is NO evidence of asymmetric septal hypertrophy. · The mitral and aortic valves are normal in structure and function. · Left atrial volume is mildly increased. · There is no evidence of pulmonary artery hypertension. · There are no other important findings on this study.      XR Chest 1 View    Result Date: 3/16/2021  EXAMINATION: XR CHEST 1 VW-  INDICATION: Chest Pain triage protocol  COMPARISON: 12/12/2019  FINDINGS: There is mild cardiac enlargement and pulmonary vascular  engorgement, without overt edema. No focal lobar consolidation. No significant pleural effusion or distinct pneumothorax.      Mild pulmonary vascular congestion without overt edema or other acute cardiopulmonary abnormality.  This report was finalized on 3/16/2021 6:13 PM by Du Ross.      US Outside Films    Result Date: 3/16/2021  This procedure was auto-finalized with no dictation required.    OUTSIDE NON-INVASIVE CARDIOLOGY STUDY    Result Date: 3/17/2021  This procedure was auto-finalized with no dictation required.    OUTSIDE INVASIVE CARDIOLOGY STUDY    Result Date: 3/17/2021  This procedure was auto-finalized with no dictation required.              Results for orders placed during the hospital encounter of 03/16/21    Adult Transthoracic Echo Complete W/ Cont if Necessary Per Protocol    Interpretation Summary  · Cardiac chamber sizes are normal.  · Global and segmental LV wall motion is normal. Calculated left ventricular ejection fraction is 67%.  · There is NO evidence of asymmetric septal hypertrophy.  · The mitral and aortic valves are normal in structure and function.  · Left atrial volume is mildly increased.  · There is no evidence of pulmonary artery hypertension.  · There are no other important findings on this study.      Plan for Follow-up of Pending Labs/Results:     Discharge Details        Discharge Medications      New Medications      Instructions Start Date   nicotine 21 MG/24HR patch  Commonly known as: NICODERM CQ   1 patch, Transdermal, Every 24 Hours   Start Date: March 19, 2021     nitroglycerin 0.4 MG SL tablet  Commonly known as: Nitrostat   0.4 mg, Sublingual, Every 5 Minutes PRN, Take no more than 3 doses in 15 minutes.         Continue These Medications      Instructions Start Date   ALPRAZolam 1 MG tablet  Commonly known as: XANAX   1 mg, Oral, 3 Times Daily PRN      atorvastatin 80 MG tablet  Commonly known as: LIPITOR   80 mg, Oral, Daily      cholecalciferol 10 MCG  (400 UNIT) tablet  Commonly known as: VITAMIN D3   400 Units, Oral, Daily      [employer] Aspirin Adult Low Dose 81 MG chewable tablet  Generic drug: aspirin   81 mg, Oral, Daily      escitalopram 10 MG tablet  Commonly known as: LEXAPRO   10 mg, Oral, Daily      fluticasone 50 MCG/ACT nasal spray  Commonly known as: FLONASE   1 spray, Nasal, 2 times daily      metoprolol tartrate 25 MG tablet  Commonly known as: LOPRESSOR   25 mg, Oral, 2 Times Daily      prasugrel 10 MG tablet  Commonly known as: EFFIENT   10 mg, Oral, Daily             Allergies   Allergen Reactions   • Bactrim [Sulfamethoxazole-Trimethoprim] Rash         Discharge Disposition:  Home or Self Care    Diet:  Hospital:  Diet Order   Procedures   • Diet Regular; Cardiac       Activity:  Activity Instructions     Activity as Tolerated      Measure Blood Pressure      Measure Weight            Restrictions or Other Recommendations:         CODE STATUS:    Code Status and Medical Interventions:   Ordered at: 03/16/21 1956     Level Of Support Discussed With:    Patient     Code Status:    CPR     Medical Interventions (Level of Support Prior to Arrest):    Full       No future appointments.    Additional Instructions for the Follow-ups that You Need to Schedule     Discharge Follow-up with PCP   As directed       Currently Documented PCP:    Rogelio Yanes MD    PCP Phone Number:    [phone]     Follow Up Details: follow up with pcp one week         Discharge Follow-up with Specified Provider: follow up with Cardiology 2-4 weeks   As directed      To: follow up with Cardiology 2-4 weeks                     KISHORE Strong  03/18/21      Time Spent on Discharge:  I spent  35  minutes on this discharge activity which included: face-to-face encounter with the patient, reviewing the data in the system, coordination of the care with the nursing staff as well as consultants, documentation, and entering orders.

## 2021-03-18 NOTE — PLAN OF CARE
Goal Outcome Evaluation:  Plan of Care Reviewed With: patient  Progress: improving   A/Ox4; VSS; NSR; and currently on room air. Up ad neil and denies chest pain at this time. Will continue to monitor.

## 2021-03-23 LAB
QT INTERVAL: 376 MS
QT INTERVAL: 410 MS
QTC INTERVAL: 448 MS
QTC INTERVAL: 449 MS

## 2021-03-24 ENCOUNTER — TELEPHONE (OUTPATIENT)
Dept: OBSTETRICS AND GYNECOLOGY | Facility: CLINIC | Age: 36
End: 2021-03-24

## 2021-04-20 ENCOUNTER — OFFICE VISIT (OUTPATIENT)
Dept: CARDIOLOGY | Facility: CLINIC | Age: 36
End: 2021-04-20

## 2021-04-20 VITALS
HEART RATE: 75 BPM | DIASTOLIC BLOOD PRESSURE: 75 MMHG | BODY MASS INDEX: 42.7 KG/M2 | SYSTOLIC BLOOD PRESSURE: 117 MMHG | HEIGHT: 63 IN | WEIGHT: 241 LBS | OXYGEN SATURATION: 98 %

## 2021-04-20 DIAGNOSIS — I25.10 CORONARY ARTERY DISEASE WITHOUT ANGINA PECTORIS, UNSPECIFIED VESSEL OR LESION TYPE, UNSPECIFIED WHETHER NATIVE OR TRANSPLANTED HEART: Chronic | ICD-10-CM

## 2021-04-20 DIAGNOSIS — I10 ESSENTIAL HYPERTENSION: Primary | Chronic | ICD-10-CM

## 2021-04-20 PROCEDURE — 99214 OFFICE O/P EST MOD 30 MIN: CPT | Performed by: INTERNAL MEDICINE

## 2021-04-20 RX ORDER — LISINOPRIL AND HYDROCHLOROTHIAZIDE 12.5; 1 MG/1; MG/1
1 TABLET ORAL DAILY
Status: ON HOLD | COMMUNITY
Start: 2021-04-19 | End: 2023-03-30

## 2021-04-20 RX ORDER — DEXTROAMPHETAMINE SACCHARATE, AMPHETAMINE ASPARTATE, DEXTROAMPHETAMINE SULFATE AND AMPHETAMINE SULFATE 7.5; 7.5; 7.5; 7.5 MG/1; MG/1; MG/1; MG/1
30 TABLET ORAL 2 TIMES DAILY
COMMUNITY

## 2021-04-20 RX ORDER — FUROSEMIDE 40 MG/1
40 TABLET ORAL DAILY PRN
Status: ON HOLD | COMMUNITY
Start: 2021-03-22 | End: 2023-03-30

## 2021-04-20 RX ORDER — DIPHENOXYLATE HYDROCHLORIDE AND ATROPINE SULFATE 2.5; .025 MG/1; MG/1
1 TABLET ORAL DAILY
Status: ON HOLD | COMMUNITY
End: 2023-03-30

## 2021-04-20 NOTE — PROGRESS NOTES
OFFICE FOLLOW UP     Date of Encounter:2021     Name: Souleymane Grossman  : 1985  Address: [address] [place] [address] [address] [place] [address]    PCP: Rogelio Yanes MD  [address] [place] [address] [address] [place] [address]    Souleymane Grossman is a 35 y.o. female.      Chief Complaint: Follow up of CAD, HTN, HLD    Problem List:   1. Coronary artery disease  a. NSTEMI 2021 [facility] gomez. [facility] 21 Dr. Genao: single vessel circumflex disease, s/p THAIS  c. Echo 21: normal LVEF 55-60%, mild inferior wall hypkinesis, no valvular abnormalities   d. [facility] admission with recurrent angina at rest, 3/16/21, ruled out  e. Echo 3/17/21: normal LVEF, global and segmental wall motion is normal, no valvular abnormalities   2. Hypertension   3. Dyslipidemia  4. Tobacco abuse  5. Morbid obesity  6. History of gestational diabetes  7. Anxiety   8. ADHD    Allergies:  Allergies   Allergen Reactions   • Bactrim [Sulfamethoxazole-Trimethoprim] Rash       Current Medications:  Current Outpatient Medications   Medication Instructions   • ALPRAZolam (XANAX) 1 mg, Oral, 3 Times Daily PRN   • amphetamine-dextroamphetamine (ADDERALL) 20 MG tablet dextroamphetamine-amphetamine 20 mg tablet   TK 1 T PO TID   • atorvastatin (LIPITOR) 80 mg, Oral, Daily   • [employer]S Aspirin Adult Low Dose 81 mg, Oral, Daily   • escitalopram (LEXAPRO) 10 mg, Oral, Daily   • fluticasone (FLONASE) 50 MCG/ACT nasal spray 1 spray, Nasal, 2 times daily   • furosemide (LASIX) 40 mg, Oral, Daily   • lisinopril-hydrochlorothiazide (PRINZIDE,ZESTORETIC) 10-12.5 MG per tablet 1 tablet, Oral, Daily   • metoprolol tartrate (LOPRESSOR) 25 mg, Oral, 2 Times Daily   • multivitamin (MULTIPLE VITAMIN PO) Multiple Vitamin capsule   Daily   • nitroglycerin (NITROSTAT) 0.4 mg, Sublingual, Every 5 Minutes PRN, Take no more than 3 doses in 15 minutes.   • prasugrel (EFFIENT) 10 mg, Oral, Daily        History of Present Illness:    Souleymane Grossman returns for follow up today. She has stopped  "cigarettes and is vaping some. She denies angina. She is active caring for 2 daughters. She is taking medications as prescribed above.      The following portions of the patient's history were reviewed and updated as appropriate: allergies, current medications and problem list.    ROS: Pertinent positives as listed in the HPI.  All other systems reviewed and negative.    Objective:    Vitals:    04/20/21 1113 04/20/21 1128   BP: 117/77 117/75   BP Location: Right arm Right arm   Patient Position: Sitting Standing   Pulse: 71 75   SpO2: 98%    Weight: 109 kg (241 lb)    Height: 160 cm (62.99\")      Body mass index is 42.7 kg/m².    Physical Exam:  GENERAL: Alert, cooperative, in no acute distress.   HEENT: Normocephalic, no adenopathy, no jugular venous distention  HEART: No discrete PMI is noted. Regular rhythm, normal rate, and no murmurs, gallops, or rubs.   LUNGS: Clear to auscultation bilaterally. No wheezing, rales or ronchi.  ABDOMEN: Soft, bowel sounds present, non-tender   NEUROLOGIC: No focal abnormalities involving strength or sensation are noted.   EXTREMITIES: No clubbing, cyanosis, or edema noted.      Diagnostic Data:    Lab Results   Component Value Date    CHOL 140 03/18/2021    CHLPL 219 (H) 02/03/2017    TRIG 119 03/18/2021    HDL 54 03/18/2021    LDL 65 03/18/2021      Procedures    Advance Care Planning   ACP discussion was held with the patient during this visit. Patient does not have an advance directive, declines further assistance.    Assessment and Plan:   1.  CAD:  No angina. Continue DAPT and high intensity statin.   2.  HTN:  Continue current medications.   3.  HLD:  LDL 65, continue statin.   4.  Obesity: We advised significant weight reduction with a structured program to obtain and maintain success.   5.  Tobacco: Vaping cessation was discussed. We have challenged her to stop by time of next visit.     I will see Souleymane Grossman back in 6 months or sooner on an as needed " basis.    Scribed for Juan Diego Oneill MD by Isabel Joy RN. 04/20/2021 14:47 EDT.     EMR Dragon/Transcription Disclaimer:  Much of this encounter note is an electronic transcription/translation of spoken language to printed text.  The electronic translation of spoken language may permit erroneous, or at times, nonsensical words or phrases to be inadvertently transcribed.  Although I have reviewed the note for such errors, some may still exist.

## 2021-10-14 NOTE — ANESTHESIA PROCEDURE NOTES
Labor Epidural    Patient location during procedure: OB  Performed By  Anesthesiologist: SHANICE GOLDBERG  Preanesthetic Checklist  Completed: patient identified, surgical consent, pre-op evaluation, timeout performed, IV checked, risks and benefits discussed and monitors and equipment checked  Prep:  Pt Position:sitting  Sterile Tech:cap, gloves, mask and sterile barrier  Prep:DuraPrep  Monitoring:blood pressure monitoring  Epidural Block Procedure:  Approach:midline  Guidance:landmark technique  Location:L3-L4  Needle Type:Tuohy  Needle Gauge:17 G  Loss of Resistance Medium: air  Cath Depth at skin:8 cm  Paresthesia: none  Aspiration:negative  Test Dose:negative  Number of Attempts: 1  Post Assessment:  Dressing:occlusive dressing applied  Pt Tolerance:patient tolerated the procedure well with no apparent complications  Complications:no            
Left wrist/done

## 2022-02-11 ENCOUNTER — APPOINTMENT (OUTPATIENT)
Dept: PREADMISSION TESTING | Facility: HOSPITAL | Age: 37
End: 2022-02-11

## 2022-02-13 ENCOUNTER — APPOINTMENT (OUTPATIENT)
Dept: PREADMISSION TESTING | Facility: HOSPITAL | Age: 37
End: 2022-02-13

## 2022-03-11 ENCOUNTER — PRE-ADMISSION TESTING (OUTPATIENT)
Dept: PREADMISSION TESTING | Facility: HOSPITAL | Age: 37
End: 2022-03-11

## 2022-03-11 VITALS — BODY MASS INDEX: 42.55 KG/M2 | WEIGHT: 249.23 LBS | HEIGHT: 64 IN

## 2022-03-11 LAB
ANION GAP SERPL CALCULATED.3IONS-SCNC: 10 MMOL/L (ref 5–15)
BUN SERPL-MCNC: 11 MG/DL (ref 6–20)
BUN/CREAT SERPL: 16.9 (ref 7–25)
CALCIUM SPEC-SCNC: 9.5 MG/DL (ref 8.6–10.5)
CHLORIDE SERPL-SCNC: 104 MMOL/L (ref 98–107)
CO2 SERPL-SCNC: 26 MMOL/L (ref 22–29)
CREAT SERPL-MCNC: 0.65 MG/DL (ref 0.57–1)
DEPRECATED RDW RBC AUTO: 41.1 FL (ref 37–54)
EGFRCR SERPLBLD CKD-EPI 2021: 116.5 ML/MIN/1.73
ERYTHROCYTE [DISTWIDTH] IN BLOOD BY AUTOMATED COUNT: 12.8 % (ref 12.3–15.4)
GLUCOSE SERPL-MCNC: 113 MG/DL (ref 65–99)
HBA1C MFR BLD: 5.6 % (ref 4.8–5.6)
HCT VFR BLD AUTO: 42.6 % (ref 34–46.6)
HGB BLD-MCNC: 14.7 G/DL (ref 12–15.9)
INR PPP: 0.99 (ref 0.85–1.16)
MCH RBC QN AUTO: 30.3 PG (ref 26.6–33)
MCHC RBC AUTO-ENTMCNC: 34.5 G/DL (ref 31.5–35.7)
MCV RBC AUTO: 87.8 FL (ref 79–97)
PLATELET # BLD AUTO: 318 10*3/MM3 (ref 140–450)
PMV BLD AUTO: 10.1 FL (ref 6–12)
POTASSIUM SERPL-SCNC: 4.1 MMOL/L (ref 3.5–5.2)
PROTHROMBIN TIME: 12.8 SECONDS (ref 11.4–14.4)
RBC # BLD AUTO: 4.85 10*6/MM3 (ref 3.77–5.28)
SODIUM SERPL-SCNC: 140 MMOL/L (ref 136–145)
WBC NRBC COR # BLD: 7.41 10*3/MM3 (ref 3.4–10.8)

## 2022-03-11 PROCEDURE — 80048 BASIC METABOLIC PNL TOTAL CA: CPT

## 2022-03-11 PROCEDURE — 85027 COMPLETE CBC AUTOMATED: CPT

## 2022-03-11 PROCEDURE — 93010 ELECTROCARDIOGRAM REPORT: CPT | Performed by: INTERNAL MEDICINE

## 2022-03-11 PROCEDURE — 85610 PROTHROMBIN TIME: CPT

## 2022-03-11 PROCEDURE — 36415 COLL VENOUS BLD VENIPUNCTURE: CPT

## 2022-03-11 PROCEDURE — 83036 HEMOGLOBIN GLYCOSYLATED A1C: CPT

## 2022-03-11 PROCEDURE — 93005 ELECTROCARDIOGRAM TRACING: CPT

## 2022-03-11 NOTE — DISCHARGE INSTRUCTIONS
The following information and instructions were given:    Do not eat, drink, smoke or chew gum after midnight the night before surgery. This includes no mints.  Take all routine, prescribed medications including heart and blood pressure medicines with a sip of water unless otherwise instructed by your physician.   Do NOT take diabetic medication unless instructed by your physician.    DO NOT shave for two days before your procedure.  Do not wear makeup.      DO NOT wear fingernail polish (gel/regular) and/or acrylic/artificial nails on the day of surgery.   If you had a recent manicure and would rather not remove polish or artificial nails, the minimum requirement is that the polish/artificial nails must be removed from the middle finger on each hand.      If you are having surgery/procedure on an upper extremity, fingernail polish/artificial fingernails must be removed for surgery.  NO EXCEPTIONS.      If you are having surgery/procedure on a lower extremity, toenail polish on both extremities must be removed for surgery.  NO EXCEPTIONS.    Remove all jewelry (advise to go to jeweler if unable to remove).  Jewelry, especially rings, can no longer be taped for surgery.    Leave anything you consider valuable at home.    Leave your suitcase in the car until after your surgery.    Bring the following with you the day of your procedure (when applicable):       -Picture ID and insurance cards       -Co-pay/deductible required by insurance       -Medications in the original bottles (not a list) including all over-the-counter medications if not brought to PAT       -Copy of advance directive, living will or power of  documents if not brought to PAT       -CPAP or BIPAP mask and tubing (do not bring machine)       -Skin prep instruction(s) sheet       -PAT Pass    Education booklet, brochure, handout or binder related to procedure given to patient.  Education booklet also includes general information related to  their recovery that mentions signs and symptoms of infection and when to call the doctor.    When applicable, an ERAS handout/booklet was given to patient.    Pain Control After Surgery handout given to patient.    Respirex use (handout given to patient) and pneumonia prevention education provided.    Signs and Symptoms of infection discussed with patient in Pre Admission Testing.  Patient instructed to call their doctor if any of the following symptoms are noted during recovery:  Fever of 100.4 F or higher, incision that is warm or has increasing bleeding, redness or drainage.    DVT Prevention instructions given verbally during Pre Admission Testing appointment that stress the importance of ambulation to improve blood circulation.  Also encouraged patient to perform foot exercises when in bed and application of a sequential device may be applied to lower extremities to improve circulation.      Please apply Chlorhexidine wipes to surgical area (if instructed) the night before procedure and the AM of procedure and document date/time of applications on skin prep instruction sheet.

## 2022-03-11 NOTE — PAT
Waiting for cardiac clearance letter from Dr. Genao's office.  Spoke with a PA who states pt should restart ASA and not stop prior to surgery.  Pt verbalized understanding.  Dr. Genao's office will also fax clearance to Dr. Ramírez's office.

## 2022-03-11 NOTE — PAT
Called Dr. Ramírez's office to verify they did receive cardiac clearance stating to restart ASA today and not stop for surgery.  They did receive and stated pt would be communicating with Dr. Ramírez prior to surgery.

## 2022-03-11 NOTE — PAT
Patient viewed general PAT education video as instructed in their preoperative information received from their surgeon.  Patient stated the general PAT education video was viewed in its entirety and survey completed.  Copies of [facility] general education handouts (Incentive Spirometry, Meds to Beds Program, Patient Belongings, Pre-op skin preparation instructions, Blood Glucose testing, Visitor policy, Surgery FAQ, Code H) distributed to patient if not printed. Education related to the PAT pass and skin preparation for surgery (if applicable) completed in [facility] as a reinforcement to PAT education video. Patient instructed to return PAT pass provided today as well as completed skin preparation sheet (if applicable) on the day of procedure.     Additionally if patient had not viewed video yet but intended to view it at home or in our waiting area, then referred them to the handout with QR code/link provided during PAT visit.  Instructed patient to complete survey after viewing the video in its entirety.  Encouraged patient/family to read [facility] general education handouts thoroughly and notify PAT staff with any questions or concerns. Patient verbalized understanding of all information and priority content.    Patient to apply Chlorhexadine wipes  to surgical area (as instructed) the night before procedure and the AM of procedure. Wipes provided.    Patient instructed to drink 20 ounces (or until full) of Gatorade and it needs to be completed 1 hour (for Main OR patients) or 2 hours (scheduled  section patients) before given arrival time for procedure (NO RED Gatorade)    Patient verbalized understanding.

## 2022-03-13 ENCOUNTER — APPOINTMENT (OUTPATIENT)
Dept: PREADMISSION TESTING | Facility: HOSPITAL | Age: 37
End: 2022-03-13

## 2022-03-13 LAB — SARS-COV-2 RNA PNL SPEC NAA+PROBE: NOT DETECTED

## 2022-03-13 PROCEDURE — C9803 HOPD COVID-19 SPEC COLLECT: HCPCS

## 2022-03-13 PROCEDURE — U0004 COV-19 TEST NON-CDC HGH THRU: HCPCS

## 2022-03-14 ENCOUNTER — ANESTHESIA EVENT (OUTPATIENT)
Dept: PERIOP | Facility: HOSPITAL | Age: 37
End: 2022-03-14

## 2022-03-14 NOTE — ANESTHESIA PREPROCEDURE EVALUATION
Anesthesia Evaluation     Patient summary reviewed and Nursing notes reviewed   no history of anesthetic complications:  NPO Solid Status: > 8 hours  NPO Liquid Status: > 2 hours           Airway   Mallampati: II  TM distance: >3 FB  Neck ROM: full  No difficulty expected  Dental - normal exam     Pulmonary - normal exam   (+) a smoker Former,   Cardiovascular - normal exam    ECG reviewed    (+) hypertension, past MI , CAD, cardiac stents (2/19/21) Drug eluting stent more than 12 months ago     ROS comment: 3/17/21 echo  Cardiac chamber sizes are normal.  · Global and segmental LV wall motion is normal. Calculated left ventricular ejection fraction is 67%.  · There is NO evidence of asymmetric septal hypertrophy.  · The mitral and aortic valves are normal in structure and function.  · Left atrial volume is mildly increased.  · There is no evidence of pulmonary artery hypertension.  · There are no other important findings on this study.    Cardiac clearance on chart    Neuro/Psych  (+) psychiatric history Anxiety,    (-) seizures, CVA  GI/Hepatic/Renal/Endo    (+) morbid obesity,  renal disease stones, diabetes mellitus (gestational),   (-) GERD, hepatitis, liver disease, no thyroid disorder    Musculoskeletal     Abdominal    Substance History   (+) drug use (marijuana)     OB/GYN          Other                      Anesthesia Plan    ASA 3     general with block   (Taps  Low dose propofol for anti-nausea technique  ASA given in preop; per cardiology pt to continue taking ASA however pt stopped it prior to surgery so dose given in preop prior to surgery. )  intravenous induction     Anesthetic plan, all risks, benefits, and alternatives have been provided, discussed and informed consent has been obtained with: patient.    Plan discussed with CRNA.        CODE STATUS:

## 2022-03-15 ENCOUNTER — HOSPITAL ENCOUNTER (OUTPATIENT)
Facility: HOSPITAL | Age: 37
Discharge: HOME | End: 2022-03-16
Attending: PLASTIC SURGERY | Admitting: PLASTIC SURGERY

## 2022-03-15 ENCOUNTER — ANESTHESIA EVENT CONVERTED (OUTPATIENT)
Dept: ANESTHESIOLOGY | Facility: HOSPITAL | Age: 37
End: 2022-03-15

## 2022-03-15 ENCOUNTER — ANESTHESIA (OUTPATIENT)
Dept: PERIOP | Facility: HOSPITAL | Age: 37
End: 2022-03-15

## 2022-03-15 DIAGNOSIS — N64.81 BREAST PTOSIS: ICD-10-CM

## 2022-03-15 LAB
ABO GROUP BLD: NORMAL
B-HCG UR QL: NEGATIVE
BLD GP AB SCN SERPL QL: NEGATIVE
EXPIRATION DATE: NORMAL
INTERNAL NEGATIVE CONTROL: NEGATIVE
INTERNAL POSITIVE CONTROL: POSITIVE
Lab: NORMAL
RH BLD: POSITIVE
T&S EXPIRATION DATE: NORMAL

## 2022-03-15 PROCEDURE — 86901 BLOOD TYPING SEROLOGIC RH(D): CPT | Performed by: PLASTIC SURGERY

## 2022-03-15 PROCEDURE — 25010000002 CEFAZOLIN IN DEXTROSE 2-4 GM/100ML-% SOLUTION: Performed by: PLASTIC SURGERY

## 2022-03-15 PROCEDURE — 63710000001 PROMETHAZINE PER 25 MG

## 2022-03-15 PROCEDURE — 81025 URINE PREGNANCY TEST: CPT | Performed by: ANESTHESIOLOGY

## 2022-03-15 PROCEDURE — 25010000002 PROPOFOL 10 MG/ML EMULSION: Performed by: NURSE ANESTHETIST, CERTIFIED REGISTERED

## 2022-03-15 PROCEDURE — 25010000002 GENTAMICIN PER 80 MG: Performed by: PLASTIC SURGERY

## 2022-03-15 PROCEDURE — 25010000002 HYDROMORPHONE 1 MG/ML SOLUTION

## 2022-03-15 PROCEDURE — 88302 TISSUE EXAM BY PATHOLOGIST: CPT | Performed by: PLASTIC SURGERY

## 2022-03-15 PROCEDURE — 86850 RBC ANTIBODY SCREEN: CPT | Performed by: PLASTIC SURGERY

## 2022-03-15 PROCEDURE — 25010000002 BUPRENORPHINE PER 0.1 MG: Performed by: NURSE ANESTHETIST, CERTIFIED REGISTERED

## 2022-03-15 PROCEDURE — 86900 BLOOD TYPING SEROLOGIC ABO: CPT | Performed by: PLASTIC SURGERY

## 2022-03-15 PROCEDURE — 25010000002 DEXAMETHASONE SODIUM PHOSPHATE 10 MG/ML SOLUTION: Performed by: NURSE ANESTHETIST, CERTIFIED REGISTERED

## 2022-03-15 PROCEDURE — 25010000002 MIDAZOLAM PER 1 MG: Performed by: ANESTHESIOLOGY

## 2022-03-15 PROCEDURE — 25010000002 FENTANYL CITRATE (PF) 50 MCG/ML SOLUTION: Performed by: NURSE ANESTHETIST, CERTIFIED REGISTERED

## 2022-03-15 PROCEDURE — 63710000001 PROMETHAZINE PER 12.5 MG: Performed by: PLASTIC SURGERY

## 2022-03-15 PROCEDURE — 25010000002 HYDROMORPHONE PER 4 MG: Performed by: NURSE ANESTHETIST, CERTIFIED REGISTERED

## 2022-03-15 PROCEDURE — 25010000002 ONDANSETRON PER 1 MG: Performed by: NURSE ANESTHETIST, CERTIFIED REGISTERED

## 2022-03-15 PROCEDURE — 25010000002 DEXAMETHASONE PER 1 MG: Performed by: NURSE ANESTHETIST, CERTIFIED REGISTERED

## 2022-03-15 PROCEDURE — C1889 IMPLANT/INSERT DEVICE, NOC: HCPCS | Performed by: PLASTIC SURGERY

## 2022-03-15 PROCEDURE — 25010000002 HYDRALAZINE PER 20 MG

## 2022-03-15 PROCEDURE — 25010000002 MORPHINE PER 10 MG: Performed by: PLASTIC SURGERY

## 2022-03-15 PROCEDURE — 25010000002 CEFAZOLIN PER 500 MG: Performed by: PLASTIC SURGERY

## 2022-03-15 DEVICE — DEV CONTRL TISS STRATAFIX SPIRAL MNCRYL UD 3/0 PLS 60CM: Type: IMPLANTABLE DEVICE | Site: ABDOMEN | Status: FUNCTIONAL

## 2022-03-15 DEVICE — DEV CONTRL TISS STRATAFIX SPIRAL MNCRYL UD 3/0 PLS 30CM: Type: IMPLANTABLE DEVICE | Site: BREAST | Status: FUNCTIONAL

## 2022-03-15 RX ORDER — SODIUM CHLORIDE 0.9 % (FLUSH) 0.9 %
10 SYRINGE (ML) INJECTION EVERY 12 HOURS SCHEDULED
Status: DISCONTINUED | OUTPATIENT
Start: 2022-03-15 | End: 2022-03-15 | Stop reason: HOSPADM

## 2022-03-15 RX ORDER — MAGNESIUM HYDROXIDE 1200 MG/15ML
LIQUID ORAL AS NEEDED
Status: DISCONTINUED | OUTPATIENT
Start: 2022-03-15 | End: 2022-03-15 | Stop reason: HOSPADM

## 2022-03-15 RX ORDER — MORPHINE SULFATE 4 MG/ML
4 INJECTION, SOLUTION INTRAMUSCULAR; INTRAVENOUS
Status: DISCONTINUED | OUTPATIENT
Start: 2022-03-15 | End: 2022-03-16 | Stop reason: HOSPADM

## 2022-03-15 RX ORDER — PROMETHAZINE HYDROCHLORIDE 25 MG/1
TABLET ORAL
Status: COMPLETED
Start: 2022-03-15 | End: 2022-03-15

## 2022-03-15 RX ORDER — NALOXONE HCL 0.4 MG/ML
0.4 VIAL (ML) INJECTION AS NEEDED
Status: DISCONTINUED | OUTPATIENT
Start: 2022-03-15 | End: 2022-03-15 | Stop reason: HOSPADM

## 2022-03-15 RX ORDER — HYDROCODONE BITARTRATE AND ACETAMINOPHEN 5; 325 MG/1; MG/1
1 TABLET ORAL ONCE AS NEEDED
Status: DISCONTINUED | OUTPATIENT
Start: 2022-03-15 | End: 2022-03-15 | Stop reason: HOSPADM

## 2022-03-15 RX ORDER — SODIUM CHLORIDE 0.9 % (FLUSH) 0.9 %
3-10 SYRINGE (ML) INJECTION AS NEEDED
Status: DISCONTINUED | OUTPATIENT
Start: 2022-03-15 | End: 2022-03-15 | Stop reason: HOSPADM

## 2022-03-15 RX ORDER — DROPERIDOL 2.5 MG/ML
0.62 INJECTION, SOLUTION INTRAMUSCULAR; INTRAVENOUS AS NEEDED
Status: DISCONTINUED | OUTPATIENT
Start: 2022-03-15 | End: 2022-03-15 | Stop reason: HOSPADM

## 2022-03-15 RX ORDER — ONDANSETRON 2 MG/ML
4 INJECTION INTRAMUSCULAR; INTRAVENOUS ONCE AS NEEDED
Status: DISCONTINUED | OUTPATIENT
Start: 2022-03-15 | End: 2022-03-15 | Stop reason: HOSPADM

## 2022-03-15 RX ORDER — ACETAMINOPHEN 500 MG
1000 TABLET ORAL EVERY 6 HOURS
Status: DISCONTINUED | OUTPATIENT
Start: 2022-03-15 | End: 2022-03-16 | Stop reason: HOSPADM

## 2022-03-15 RX ORDER — PROPOFOL 10 MG/ML
VIAL (ML) INTRAVENOUS AS NEEDED
Status: DISCONTINUED | OUTPATIENT
Start: 2022-03-15 | End: 2022-03-15 | Stop reason: SURG

## 2022-03-15 RX ORDER — PRASUGREL 10 MG/1
10 TABLET, FILM COATED ORAL DAILY
Status: DISCONTINUED | OUTPATIENT
Start: 2022-03-15 | End: 2022-03-15

## 2022-03-15 RX ORDER — ROCURONIUM BROMIDE 10 MG/ML
INJECTION, SOLUTION INTRAVENOUS AS NEEDED
Status: DISCONTINUED | OUTPATIENT
Start: 2022-03-15 | End: 2022-03-15 | Stop reason: SURG

## 2022-03-15 RX ORDER — ONDANSETRON 2 MG/ML
INJECTION INTRAMUSCULAR; INTRAVENOUS AS NEEDED
Status: DISCONTINUED | OUTPATIENT
Start: 2022-03-15 | End: 2022-03-15 | Stop reason: SURG

## 2022-03-15 RX ORDER — NALOXONE HCL 0.4 MG/ML
0.4 VIAL (ML) INJECTION
Status: DISCONTINUED | OUTPATIENT
Start: 2022-03-15 | End: 2022-03-16 | Stop reason: HOSPADM

## 2022-03-15 RX ORDER — DEXAMETHASONE SODIUM PHOSPHATE 10 MG/ML
INJECTION, SOLUTION INTRAMUSCULAR; INTRAVENOUS
Status: COMPLETED | OUTPATIENT
Start: 2022-03-15 | End: 2022-03-15

## 2022-03-15 RX ORDER — HYDRALAZINE HYDROCHLORIDE 20 MG/ML
INJECTION INTRAMUSCULAR; INTRAVENOUS
Status: COMPLETED
Start: 2022-03-15 | End: 2022-03-15

## 2022-03-15 RX ORDER — DIPHENHYDRAMINE HYDROCHLORIDE 50 MG/ML
12.5 INJECTION INTRAMUSCULAR; INTRAVENOUS EVERY 6 HOURS PRN
Status: DISCONTINUED | OUTPATIENT
Start: 2022-03-15 | End: 2022-03-16 | Stop reason: HOSPADM

## 2022-03-15 RX ORDER — LIDOCAINE HYDROCHLORIDE 10 MG/ML
0.5 INJECTION, SOLUTION EPIDURAL; INFILTRATION; INTRACAUDAL; PERINEURAL ONCE AS NEEDED
Status: COMPLETED | OUTPATIENT
Start: 2022-03-15 | End: 2022-03-15

## 2022-03-15 RX ORDER — PROMETHAZINE HYDROCHLORIDE 25 MG/1
25 TABLET ORAL ONCE AS NEEDED
Status: DISCONTINUED | OUTPATIENT
Start: 2022-03-15 | End: 2022-03-15 | Stop reason: HOSPADM

## 2022-03-15 RX ORDER — FAMOTIDINE 10 MG/ML
20 INJECTION, SOLUTION INTRAVENOUS ONCE
Status: DISCONTINUED | OUTPATIENT
Start: 2022-03-15 | End: 2022-03-15 | Stop reason: HOSPADM

## 2022-03-15 RX ORDER — HYDRALAZINE HYDROCHLORIDE 20 MG/ML
5 INJECTION INTRAMUSCULAR; INTRAVENOUS
Status: DISCONTINUED | OUTPATIENT
Start: 2022-03-15 | End: 2022-03-15 | Stop reason: HOSPADM

## 2022-03-15 RX ORDER — HYDROMORPHONE HYDROCHLORIDE 1 MG/ML
0.5 INJECTION, SOLUTION INTRAMUSCULAR; INTRAVENOUS; SUBCUTANEOUS
Status: DISCONTINUED | OUTPATIENT
Start: 2022-03-15 | End: 2022-03-15 | Stop reason: HOSPADM

## 2022-03-15 RX ORDER — MIDAZOLAM HYDROCHLORIDE 1 MG/ML
1 INJECTION INTRAMUSCULAR; INTRAVENOUS
Status: DISCONTINUED | OUTPATIENT
Start: 2022-03-15 | End: 2022-03-15 | Stop reason: HOSPADM

## 2022-03-15 RX ORDER — PROMETHAZINE HYDROCHLORIDE 25 MG/1
25 TABLET ORAL ONCE AS NEEDED
Status: COMPLETED | OUTPATIENT
Start: 2022-03-15 | End: 2022-03-15

## 2022-03-15 RX ORDER — DROPERIDOL 2.5 MG/ML
0.62 INJECTION, SOLUTION INTRAMUSCULAR; INTRAVENOUS ONCE AS NEEDED
Status: DISCONTINUED | OUTPATIENT
Start: 2022-03-15 | End: 2022-03-15 | Stop reason: HOSPADM

## 2022-03-15 RX ORDER — IPRATROPIUM BROMIDE AND ALBUTEROL SULFATE 2.5; .5 MG/3ML; MG/3ML
3 SOLUTION RESPIRATORY (INHALATION) ONCE AS NEEDED
Status: DISCONTINUED | OUTPATIENT
Start: 2022-03-15 | End: 2022-03-15 | Stop reason: HOSPADM

## 2022-03-15 RX ORDER — OXYCODONE AND ACETAMINOPHEN 10; 325 MG/1; MG/1
1 TABLET ORAL EVERY 4 HOURS PRN
Status: DISCONTINUED | OUTPATIENT
Start: 2022-03-15 | End: 2022-03-16 | Stop reason: HOSPADM

## 2022-03-15 RX ORDER — OXYCODONE HYDROCHLORIDE AND ACETAMINOPHEN 5; 325 MG/1; MG/1
1 TABLET ORAL EVERY 4 HOURS PRN
Status: DISCONTINUED | OUTPATIENT
Start: 2022-03-15 | End: 2022-03-16 | Stop reason: HOSPADM

## 2022-03-15 RX ORDER — CEFAZOLIN SODIUM 2 G/100ML
2 INJECTION, SOLUTION INTRAVENOUS EVERY 8 HOURS
Status: DISCONTINUED | OUTPATIENT
Start: 2022-03-15 | End: 2022-03-16 | Stop reason: HOSPADM

## 2022-03-15 RX ORDER — LABETALOL HYDROCHLORIDE 5 MG/ML
10 INJECTION, SOLUTION INTRAVENOUS EVERY 6 HOURS PRN
Status: DISCONTINUED | OUTPATIENT
Start: 2022-03-15 | End: 2022-03-16 | Stop reason: HOSPADM

## 2022-03-15 RX ORDER — FAMOTIDINE 20 MG/1
20 TABLET, FILM COATED ORAL ONCE
Status: COMPLETED | OUTPATIENT
Start: 2022-03-15 | End: 2022-03-15

## 2022-03-15 RX ORDER — MEPERIDINE HYDROCHLORIDE 25 MG/ML
12.5 INJECTION INTRAMUSCULAR; INTRAVENOUS; SUBCUTANEOUS
Status: DISCONTINUED | OUTPATIENT
Start: 2022-03-15 | End: 2022-03-15 | Stop reason: HOSPADM

## 2022-03-15 RX ORDER — ATORVASTATIN CALCIUM 40 MG/1
80 TABLET, FILM COATED ORAL NIGHTLY
Status: DISCONTINUED | OUTPATIENT
Start: 2022-03-15 | End: 2022-03-16 | Stop reason: HOSPADM

## 2022-03-15 RX ORDER — BUPRENORPHINE HYDROCHLORIDE 0.32 MG/ML
INJECTION INTRAMUSCULAR; INTRAVENOUS
Status: COMPLETED | OUTPATIENT
Start: 2022-03-15 | End: 2022-03-15

## 2022-03-15 RX ORDER — SODIUM CHLORIDE 0.9 % (FLUSH) 0.9 %
10 SYRINGE (ML) INJECTION AS NEEDED
Status: DISCONTINUED | OUTPATIENT
Start: 2022-03-15 | End: 2022-03-15 | Stop reason: HOSPADM

## 2022-03-15 RX ORDER — FENTANYL CITRATE 50 UG/ML
INJECTION, SOLUTION INTRAMUSCULAR; INTRAVENOUS AS NEEDED
Status: DISCONTINUED | OUTPATIENT
Start: 2022-03-15 | End: 2022-03-15 | Stop reason: SURG

## 2022-03-15 RX ORDER — LABETALOL HYDROCHLORIDE 5 MG/ML
INJECTION, SOLUTION INTRAVENOUS AS NEEDED
Status: DISCONTINUED | OUTPATIENT
Start: 2022-03-15 | End: 2022-03-15 | Stop reason: SURG

## 2022-03-15 RX ORDER — PROMETHAZINE HYDROCHLORIDE 12.5 MG/1
6.25 SUPPOSITORY RECTAL EVERY 6 HOURS PRN
Status: DISCONTINUED | OUTPATIENT
Start: 2022-03-15 | End: 2022-03-16 | Stop reason: HOSPADM

## 2022-03-15 RX ORDER — LABETALOL HYDROCHLORIDE 5 MG/ML
5 INJECTION, SOLUTION INTRAVENOUS
Status: DISCONTINUED | OUTPATIENT
Start: 2022-03-15 | End: 2022-03-15 | Stop reason: HOSPADM

## 2022-03-15 RX ORDER — ASPIRIN 81 MG/1
324 TABLET, CHEWABLE ORAL DAILY
Status: DISCONTINUED | OUTPATIENT
Start: 2022-03-15 | End: 2022-03-15 | Stop reason: HOSPADM

## 2022-03-15 RX ORDER — SODIUM CHLORIDE, SODIUM LACTATE, POTASSIUM CHLORIDE, CALCIUM CHLORIDE 600; 310; 30; 20 MG/100ML; MG/100ML; MG/100ML; MG/100ML
INJECTION, SOLUTION INTRAVENOUS CONTINUOUS PRN
Status: DISCONTINUED | OUTPATIENT
Start: 2022-03-15 | End: 2022-03-15 | Stop reason: SURG

## 2022-03-15 RX ORDER — BUPIVACAINE HYDROCHLORIDE 2.5 MG/ML
INJECTION, SOLUTION EPIDURAL; INFILTRATION; INTRACAUDAL
Status: COMPLETED | OUTPATIENT
Start: 2022-03-15 | End: 2022-03-15

## 2022-03-15 RX ORDER — ESCITALOPRAM OXALATE 10 MG/1
10 TABLET ORAL DAILY
Status: DISCONTINUED | OUTPATIENT
Start: 2022-03-15 | End: 2022-03-15

## 2022-03-15 RX ORDER — ASPIRIN 325 MG
325 TABLET, DELAYED RELEASE (ENTERIC COATED) ORAL ONCE
Status: DISCONTINUED | OUTPATIENT
Start: 2022-03-15 | End: 2022-03-15 | Stop reason: HOSPADM

## 2022-03-15 RX ORDER — HYDROMORPHONE HCL 110MG/55ML
PATIENT CONTROLLED ANALGESIA SYRINGE INTRAVENOUS AS NEEDED
Status: DISCONTINUED | OUTPATIENT
Start: 2022-03-15 | End: 2022-03-15 | Stop reason: SURG

## 2022-03-15 RX ORDER — SODIUM CHLORIDE 0.9 % (FLUSH) 0.9 %
3 SYRINGE (ML) INJECTION EVERY 12 HOURS SCHEDULED
Status: DISCONTINUED | OUTPATIENT
Start: 2022-03-15 | End: 2022-03-15 | Stop reason: HOSPADM

## 2022-03-15 RX ORDER — MORPHINE SULFATE 2 MG/ML
2 INJECTION, SOLUTION INTRAMUSCULAR; INTRAVENOUS
Status: DISCONTINUED | OUTPATIENT
Start: 2022-03-15 | End: 2022-03-15

## 2022-03-15 RX ORDER — FENTANYL CITRATE 50 UG/ML
50 INJECTION, SOLUTION INTRAMUSCULAR; INTRAVENOUS
Status: DISCONTINUED | OUTPATIENT
Start: 2022-03-15 | End: 2022-03-15 | Stop reason: HOSPADM

## 2022-03-15 RX ORDER — NALOXONE HCL 0.4 MG/ML
0.4 VIAL (ML) INJECTION
Status: DISCONTINUED | OUTPATIENT
Start: 2022-03-15 | End: 2022-03-15

## 2022-03-15 RX ORDER — OXYCODONE HYDROCHLORIDE 5 MG/1
5 TABLET ORAL EVERY 4 HOURS PRN
Status: DISCONTINUED | OUTPATIENT
Start: 2022-03-15 | End: 2022-03-15

## 2022-03-15 RX ORDER — HYDRALAZINE HYDROCHLORIDE 20 MG/ML
10 INJECTION INTRAMUSCULAR; INTRAVENOUS EVERY 6 HOURS PRN
Status: DISCONTINUED | OUTPATIENT
Start: 2022-03-15 | End: 2022-03-16 | Stop reason: HOSPADM

## 2022-03-15 RX ORDER — LABETALOL HYDROCHLORIDE 5 MG/ML
INJECTION, SOLUTION INTRAVENOUS
Status: COMPLETED
Start: 2022-03-15 | End: 2022-03-15

## 2022-03-15 RX ORDER — CEFAZOLIN SODIUM 2 G/100ML
2 INJECTION, SOLUTION INTRAVENOUS ONCE
Status: COMPLETED | OUTPATIENT
Start: 2022-03-15 | End: 2022-03-15

## 2022-03-15 RX ORDER — DIAZEPAM 5 MG/1
5 TABLET ORAL EVERY 8 HOURS PRN
Status: DISCONTINUED | OUTPATIENT
Start: 2022-03-15 | End: 2022-03-16 | Stop reason: HOSPADM

## 2022-03-15 RX ORDER — PROMETHAZINE HYDROCHLORIDE 12.5 MG/1
6.25 TABLET ORAL EVERY 6 HOURS PRN
Status: DISCONTINUED | OUTPATIENT
Start: 2022-03-15 | End: 2022-03-16 | Stop reason: HOSPADM

## 2022-03-15 RX ORDER — SODIUM CHLORIDE, SODIUM LACTATE, POTASSIUM CHLORIDE, CALCIUM CHLORIDE 600; 310; 30; 20 MG/100ML; MG/100ML; MG/100ML; MG/100ML
9 INJECTION, SOLUTION INTRAVENOUS CONTINUOUS
Status: DISCONTINUED | OUTPATIENT
Start: 2022-03-15 | End: 2022-03-16 | Stop reason: HOSPADM

## 2022-03-15 RX ORDER — ONDANSETRON 2 MG/ML
4 INJECTION INTRAMUSCULAR; INTRAVENOUS EVERY 6 HOURS PRN
Status: DISCONTINUED | OUTPATIENT
Start: 2022-03-15 | End: 2022-03-16 | Stop reason: HOSPADM

## 2022-03-15 RX ORDER — DEXAMETHASONE SODIUM PHOSPHATE 10 MG/ML
INJECTION INTRAMUSCULAR; INTRAVENOUS AS NEEDED
Status: DISCONTINUED | OUTPATIENT
Start: 2022-03-15 | End: 2022-03-15 | Stop reason: SURG

## 2022-03-15 RX ORDER — BUPIVACAINE HYDROCHLORIDE AND EPINEPHRINE 2.5; 5 MG/ML; UG/ML
INJECTION, SOLUTION EPIDURAL; INFILTRATION; INTRACAUDAL; PERINEURAL AS NEEDED
Status: DISCONTINUED | OUTPATIENT
Start: 2022-03-15 | End: 2022-03-15 | Stop reason: HOSPADM

## 2022-03-15 RX ORDER — SODIUM CHLORIDE 9 MG/ML
50 INJECTION, SOLUTION INTRAVENOUS CONTINUOUS
Status: DISCONTINUED | OUTPATIENT
Start: 2022-03-15 | End: 2022-03-16 | Stop reason: HOSPADM

## 2022-03-15 RX ORDER — LIDOCAINE HYDROCHLORIDE 20 MG/ML
INJECTION, SOLUTION INFILTRATION; PERINEURAL AS NEEDED
Status: DISCONTINUED | OUTPATIENT
Start: 2022-03-15 | End: 2022-03-15 | Stop reason: SURG

## 2022-03-15 RX ORDER — PROMETHAZINE HYDROCHLORIDE 25 MG/1
25 SUPPOSITORY RECTAL ONCE AS NEEDED
Status: DISCONTINUED | OUTPATIENT
Start: 2022-03-15 | End: 2022-03-15 | Stop reason: HOSPADM

## 2022-03-15 RX ADMIN — ASPIRIN 81 MG CHEWABLE TABLET 324 MG: 81 TABLET CHEWABLE at 07:10

## 2022-03-15 RX ADMIN — HYDROMORPHONE HYDROCHLORIDE 0.5 MG: 2 INJECTION, SOLUTION INTRAMUSCULAR; INTRAVENOUS; SUBCUTANEOUS at 08:54

## 2022-03-15 RX ADMIN — CEFAZOLIN SODIUM 2 G: 2 INJECTION, SOLUTION INTRAVENOUS at 07:50

## 2022-03-15 RX ADMIN — DIAZEPAM 5 MG: 5 TABLET ORAL at 17:09

## 2022-03-15 RX ADMIN — PROPOFOL 50 MG: 10 INJECTION, EMULSION INTRAVENOUS at 09:23

## 2022-03-15 RX ADMIN — OXYCODONE HYDROCHLORIDE AND ACETAMINOPHEN 1 TABLET: 10; 325 TABLET ORAL at 22:09

## 2022-03-15 RX ADMIN — ROCURONIUM BROMIDE 20 MG: 10 INJECTION, SOLUTION INTRAVENOUS at 09:54

## 2022-03-15 RX ADMIN — HYDROMORPHONE HYDROCHLORIDE 0.5 MG: 1 INJECTION, SOLUTION INTRAMUSCULAR; INTRAVENOUS; SUBCUTANEOUS at 14:54

## 2022-03-15 RX ADMIN — METOPROLOL TARTRATE 25 MG: 25 TABLET, FILM COATED ORAL at 20:06

## 2022-03-15 RX ADMIN — CEFAZOLIN SODIUM 2 G: 2 INJECTION, SOLUTION INTRAVENOUS at 20:06

## 2022-03-15 RX ADMIN — SUGAMMADEX 200 MG: 100 INJECTION, SOLUTION INTRAVENOUS at 13:18

## 2022-03-15 RX ADMIN — LABETALOL HYDROCHLORIDE 5 MG: 5 INJECTION, SOLUTION INTRAVENOUS at 09:21

## 2022-03-15 RX ADMIN — SODIUM CHLORIDE, POTASSIUM CHLORIDE, SODIUM LACTATE AND CALCIUM CHLORIDE: 600; 310; 30; 20 INJECTION, SOLUTION INTRAVENOUS at 09:51

## 2022-03-15 RX ADMIN — PROPOFOL 50 MG: 10 INJECTION, EMULSION INTRAVENOUS at 13:16

## 2022-03-15 RX ADMIN — FENTANYL CITRATE 50 MCG: 50 INJECTION, SOLUTION INTRAMUSCULAR; INTRAVENOUS at 08:26

## 2022-03-15 RX ADMIN — PROMETHAZINE HYDROCHLORIDE 25 MG: 25 TABLET ORAL at 15:03

## 2022-03-15 RX ADMIN — ROCURONIUM BROMIDE 20 MG: 10 INJECTION, SOLUTION INTRAVENOUS at 08:29

## 2022-03-15 RX ADMIN — HYDROCHLOROTHIAZIDE: 12.5 CAPSULE ORAL at 16:57

## 2022-03-15 RX ADMIN — HYDROMORPHONE HYDROCHLORIDE 0.5 MG: 1 INJECTION, SOLUTION INTRAMUSCULAR; INTRAVENOUS; SUBCUTANEOUS at 15:46

## 2022-03-15 RX ADMIN — MORPHINE SULFATE 4 MG: 4 INJECTION, SOLUTION INTRAMUSCULAR; INTRAVENOUS at 21:10

## 2022-03-15 RX ADMIN — ONDANSETRON 4 MG: 2 INJECTION INTRAMUSCULAR; INTRAVENOUS at 11:27

## 2022-03-15 RX ADMIN — LABETALOL HYDROCHLORIDE 5 MG: 5 INJECTION, SOLUTION INTRAVENOUS at 13:14

## 2022-03-15 RX ADMIN — LABETALOL 20 MG/4 ML (5 MG/ML) INTRAVENOUS SYRINGE 5 MG: at 15:12

## 2022-03-15 RX ADMIN — ACETAMINOPHEN 1000 MG: 500 TABLET ORAL at 23:07

## 2022-03-15 RX ADMIN — HYDROMORPHONE HYDROCHLORIDE 0.5 MG: 1 INJECTION, SOLUTION INTRAMUSCULAR; INTRAVENOUS; SUBCUTANEOUS at 14:43

## 2022-03-15 RX ADMIN — BUPIVACAINE HYDROCHLORIDE 60 ML: 2.5 INJECTION, SOLUTION EPIDURAL; INFILTRATION; INTRACAUDAL; PERINEURAL at 07:50

## 2022-03-15 RX ADMIN — HYDROMORPHONE HYDROCHLORIDE 0.5 MG: 2 INJECTION, SOLUTION INTRAMUSCULAR; INTRAVENOUS; SUBCUTANEOUS at 12:48

## 2022-03-15 RX ADMIN — HYDROMORPHONE HYDROCHLORIDE 0.5 MG: 2 INJECTION, SOLUTION INTRAMUSCULAR; INTRAVENOUS; SUBCUTANEOUS at 12:13

## 2022-03-15 RX ADMIN — SODIUM CHLORIDE, POTASSIUM CHLORIDE, SODIUM LACTATE AND CALCIUM CHLORIDE 9 ML/HR: 600; 310; 30; 20 INJECTION, SOLUTION INTRAVENOUS at 06:22

## 2022-03-15 RX ADMIN — FAMOTIDINE 20 MG: 20 TABLET ORAL at 06:22

## 2022-03-15 RX ADMIN — LABETALOL 20 MG/4 ML (5 MG/ML) INTRAVENOUS SYRINGE 5 MG: at 14:59

## 2022-03-15 RX ADMIN — FENTANYL CITRATE 50 MCG: 50 INJECTION, SOLUTION INTRAMUSCULAR; INTRAVENOUS at 13:03

## 2022-03-15 RX ADMIN — LIDOCAINE HYDROCHLORIDE 0.5 ML: 10 INJECTION, SOLUTION EPIDURAL; INFILTRATION; INTRACAUDAL; PERINEURAL at 06:22

## 2022-03-15 RX ADMIN — MORPHINE SULFATE 4 MG: 4 INJECTION, SOLUTION INTRAMUSCULAR; INTRAVENOUS at 18:57

## 2022-03-15 RX ADMIN — ACETAMINOPHEN 1000 MG: 500 TABLET ORAL at 16:59

## 2022-03-15 RX ADMIN — ROCURONIUM BROMIDE 60 MG: 10 INJECTION, SOLUTION INTRAVENOUS at 07:43

## 2022-03-15 RX ADMIN — BUPRENORPHINE HYDROCHLORIDE 0.3 MG: 0.32 INJECTION INTRAMUSCULAR; INTRAVENOUS at 07:50

## 2022-03-15 RX ADMIN — LABETALOL HYDROCHLORIDE 5 MG: 5 INJECTION, SOLUTION INTRAVENOUS at 11:29

## 2022-03-15 RX ADMIN — FENTANYL CITRATE 50 MCG: 50 INJECTION, SOLUTION INTRAMUSCULAR; INTRAVENOUS at 07:42

## 2022-03-15 RX ADMIN — PROPOFOL 200 MG: 10 INJECTION, EMULSION INTRAVENOUS at 07:42

## 2022-03-15 RX ADMIN — DEXAMETHASONE SODIUM PHOSPHATE 8 MG: 10 INJECTION INTRAMUSCULAR; INTRAVENOUS at 07:48

## 2022-03-15 RX ADMIN — FENTANYL CITRATE 50 MCG: 50 INJECTION, SOLUTION INTRAMUSCULAR; INTRAVENOUS at 13:09

## 2022-03-15 RX ADMIN — HYDRALAZINE HYDROCHLORIDE 5 MG: 20 INJECTION INTRAMUSCULAR; INTRAVENOUS at 14:45

## 2022-03-15 RX ADMIN — LIDOCAINE HYDROCHLORIDE 80 MG: 20 INJECTION, SOLUTION INFILTRATION; PERINEURAL at 07:42

## 2022-03-15 RX ADMIN — HYDROMORPHONE HYDROCHLORIDE 0.5 MG: 2 INJECTION, SOLUTION INTRAMUSCULAR; INTRAVENOUS; SUBCUTANEOUS at 08:42

## 2022-03-15 RX ADMIN — PROPOFOL 25 MCG/KG/MIN: 10 INJECTION, EMULSION INTRAVENOUS at 07:46

## 2022-03-15 RX ADMIN — LABETALOL HYDROCHLORIDE 5 MG: 5 INJECTION, SOLUTION INTRAVENOUS at 13:01

## 2022-03-15 RX ADMIN — CEFAZOLIN SODIUM 2 G: 2 INJECTION, SOLUTION INTRAVENOUS at 11:50

## 2022-03-15 RX ADMIN — PROMETHAZINE HYDROCHLORIDE 6.25 MG: 12.5 TABLET ORAL at 20:15

## 2022-03-15 RX ADMIN — DEXAMETHASONE SODIUM PHOSPHATE 2 MG: 10 INJECTION, SOLUTION INTRAMUSCULAR; INTRAVENOUS at 07:50

## 2022-03-15 RX ADMIN — MORPHINE SULFATE 2 MG: 2 INJECTION, SOLUTION INTRAMUSCULAR; INTRAVENOUS at 16:51

## 2022-03-15 RX ADMIN — OXYCODONE 5 MG: 5 TABLET ORAL at 18:05

## 2022-03-15 RX ADMIN — SODIUM CHLORIDE, POTASSIUM CHLORIDE, SODIUM LACTATE AND CALCIUM CHLORIDE: 600; 310; 30; 20 INJECTION, SOLUTION INTRAVENOUS at 07:40

## 2022-03-15 RX ADMIN — MIDAZOLAM 1 MG: 1 INJECTION INTRAMUSCULAR; INTRAVENOUS at 07:03

## 2022-03-15 NOTE — ACP (ADVANCE CARE PLANNING)
assisted patient with the completion of a living will.  The patient has named Jonathan Farrar as her primary HCS. If she is EOL, she does not wish to be kept alive by mechanical means nor does she want artificial nutrients or hydration.  Rather, she wants to die naturally.  I notarized the document, faxed a copy to medical records, placed a copy in her chart and returned the original plus copies to the patient.

## 2022-03-15 NOTE — H&P
Patient Care Team:      Chief complaint: Cosmetic surgery    Subjective:  Patient is a 36 y.o.female presents with no previous history of cosmetic surgery. Here today for lipo, breast aug and abdominoplasty. She denies any recent changes in her general health.   MI 1 year ago with stent, no current blood thinning meds, off ASA 3/2/22    Review of Systems:  General ROS: negative  Cardiovascular ROS: no chest pain or dyspnea on exertion  Respiratory ROS: no cough, shortness of breath, or wheezing      Allergies:   Allergies   Allergen Reactions   • Bactrim [Sulfamethoxazole-Trimethoprim] Rash          Latex: no  Contrast Dye: no    Home Meds    Medications Prior to Admission   Medication Sig Dispense Refill Last Dose   • ALPRAZolam (XANAX) 1 MG tablet Take 1 mg by mouth 3 (Three) Times a Day As Needed for Anxiety.   3/13/2022   • amphetamine-dextroamphetamine (ADDERALL) 20 MG tablet Take 20 mg by mouth 3 (Three) Times a Day. Pt usually takes it only BID   3/12/2022   • atorvastatin (LIPITOR) 80 MG tablet Take 80 mg by mouth Daily.      • [facility] Aspirin Adult Low Dose 81 MG chewable tablet Chew 81 mg Daily.   3/2/2022   • escitalopram (LEXAPRO) 10 MG tablet Take 10 mg by mouth Daily.      • fluticasone (FLONASE) 50 MCG/ACT nasal spray 1 spray into the nostril(s) as directed by provider 2 (two) times a day.      • furosemide (LASIX) 40 MG tablet Take 40 mg by mouth Daily As Needed.   3/12/2022   • lisinopril-hydrochlorothiazide (PRINZIDE,ZESTORETIC) 10-12.5 MG per tablet Take 1 tablet by mouth Daily.      • metoprolol tartrate (LOPRESSOR) 25 MG tablet Take 1 tablet by mouth 2 (Two) Times a Day. 60 tablet 11    • multivitamin (THERAGRAN) tablet tablet 1 tablet Daily.   More than a month at Unknown time   • nitroglycerin (Nitrostat) 0.4 MG SL tablet Place 1 tablet under the tongue Every 5 (Five) Minutes As Needed for Chest Pain. Take no more than 3 doses in 15 minutes. 25 tablet 11    • prasugrel (EFFIENT) 10 MG tablet  Take 10 mg by mouth Daily.        PMH:   Past Medical History:   Diagnosis Date   • Abnormal Pap smear of cervix    • ADHD (attention deficit hyperactivity disorder)    • Anxiety     PCP manages   • Anxiety    • Blood in urine    • CAD (coronary artery disease) 03/16/2021   • Chills    • Endometriosis    • Essential hypertension 03/16/2021   • Fatigue    • Gestational diabetes 2017    took oral meds in 2014 pregnancy; recently dx in 2017 pregnancy    • Heart attack (HCC)     02/19/2021   • History of MI (myocardial infarction) 03/16/2021    S/p NSTEMI 2/18/2021 with cath and THAIS to circumflex on 2/19/2021   • Kidney stones    • Low back pain    • Marijuana use 03/16/2021    occ vapes marijuana    • Mood changes    • Nausea    • Night sweats    • Obesity, Class III, BMI 40-49.9 (morbid obesity) ([facility])    • Pelvic pain    • Urinary incontinence    • Weight gain      PSH:    Past Surgical History:   Procedure Laterality Date   • COLPOSCOPY     • CORONARY ANGIOPLASTY WITH STENT PLACEMENT  02/2021   • CRYOTHERAPY      cervix   • CYSTOSCOPY BLADDER STONE LITHOTRIPSY  02/2017    reports has 2 remaining stones   • CYSTOSCOPY BLADDER STONE LITHOTRIPSY      kidney stones, h/o stent placement   • DILATATION AND CURETTAGE      08/2020   • ENDOMETRIAL ABLATION  08/2020   • HYSTEROSCOPY  08/2020   • LAPAROSCOPIC TUBAL LIGATION     • WISDOM TOOTH EXTRACTION     • WISDOM TOOTH EXTRACTION       Immunization History: pneumo: no   Flu: no  Tetanus: yes  Social History:   Tobacco: former   Alcohol: no      Physical Exam:/77 (BP Location: Right arm, Patient Position: Lying)   Pulse 75   Temp 97.4 °F (36.3 °C) (Temporal)   Resp 16   SpO2 99%       General Appearance:    Alert, cooperative, no distress, appears stated age, obese   Head:    Normocephalic, without obvious abnormality, atraumatic   Lungs:     Clear to auscultation bilaterally, respirations unlabored    Heart: Regular rate and rhythm, S1 and S2 normal, no murmur,  rub    or gallop    Abdomen:    Soft without tenderness   Breast Exam:    deferred   Genitalia:    deferred   Extremities:   Extremities normal, atraumatic, no cyanosis or edema   Skin:   Skin color, texture, turgor normal, no rashes or lesions   Neurologic:   Grossly intact     Results Review: CBC, Chem profile, EKG on chart.  Covid-neg, HCG-neg     Impression: Cosmetic surgery    Plan: For bilateral mastopexy, abdominoplasty with liposuction,bilateral breast augmentation with silicone implants. Liposuction thighs and knees today  NOE Patton 3/15/2022 06:48 EDT

## 2022-03-15 NOTE — ANESTHESIA POSTPROCEDURE EVALUATION
Patient: Souleymane Grossman    Procedure Summary     Date: 03/15/22 Room / Location:  [facility] /  [facility]    Anesthesia Start: 0740 Anesthesia Stop:     Procedures:       MASTOPEXY (Bilateral Breast)      ABDOMINOPLASTY WITH LIPOSUCTION (N/A Abdomen)      BREAST AUGMENTATION WITH SILICONE IMPLANTS, (Bilateral Breast)      LIPOSUCTION OF INNER THIGHS AND KNEES (Bilateral Hip) Diagnosis:     Surgeons: Juan Diego Ramírez MD Provider: Veronica Tuttle MD    Anesthesia Type: general with block ASA Status: 3          Anesthesia Type: general with block    Vitals  Vitals Value Taken Time   /68 03/15/22 1351   Temp     Pulse 80 03/15/22 1355   Resp     SpO2 97 % 03/15/22 1355   Vitals shown include unvalidated device data.        Post Anesthesia Care and Evaluation    Patient location during evaluation: PACU  Patient participation: complete - patient participated  Level of consciousness: responsive to light touch  Pain management: adequate  Airway patency: patent  Anesthetic complications: No anesthetic complications  PONV Status: none  Cardiovascular status: hemodynamically stable and acceptable  Respiratory status: nonlabored ventilation, acceptable and nasal cannula  Hydration status: acceptable

## 2022-03-15 NOTE — BRIEF OP NOTE
MASTOPEXY, ABDOMINOPLASTY, BREAST AUGMENTATION, LIPOSUCTION FLANK HIP THIGH  Progress Note    Souleymane Grossman  3/15/2022    Pre-op Diagnosis:   1. Encounter for cosmetic surgery  2. Bilateral breast ptosis  3. Excess abdominal skin        Post-Op Diagnosis Codes:   same    Procedure/CPT® Codes:        Procedure(s):  MASTOPEXY  ABDOMINOPLASTY WITH LIPOSUCTION  BREAST AUGMENTATION WITH SILICONE IMPLANTS,  LIPOSUCTION OF INNER THIGHS AND KNEES    Surgeon(s):  Juan Diego Ramírez MD Hill, Joseph Lee, MD    Anesthesia: General    Staff:   Circulator: Ho Cespedes RN; Yana Mcnally, CARLY; Keisha Tsai RN  Physician Assistant: Florencia Mosher PA  Scrub Person: Peggy Balbuena; Elyssa Huitron         Estimated Blood Loss: 125 mL    Urine Voided: 550 mL    Specimens:                Specimens     ID Source Type Tests Collected By Collected At Frozen?    1 Blood, Venous Line Blood · TYPE AND SCREEN   Juan Diego Ramírez MD 3/15/22 0852     A Breast, Right Tissue · TISSUE PATHOLOGY EXAM   Juan Diego Ramírez MD 3/15/22 0915 No    B Breast, Left Tissue · TISSUE PATHOLOGY EXAM   Juan Diego Ramírez MD 3/15/22 0915 No                Drains:   Closed/Suction Drain 1 Right Hip 15 Fr. (Active)       Closed/Suction Drain 1 Left Hip 15 Fr. (Active)       Urethral Catheter Double-lumen;Straight-tip;Silicone;Non-latex 16 Fr. (Active)       Findings: see op note    Complications: none immediate           Juan Diego Ramírez MD     Date: 3/15/2022  Time: 13:58 EDT

## 2022-03-15 NOTE — ANESTHESIA PROCEDURE NOTES
Peripheral Block      Patient reassessed immediately prior to procedure    Patient location during procedure: OR  Start time: 3/15/2022 7:45 AM  Stop time: 3/15/2022 7:50 AM  Reason for block: at surgeon's request and post-op pain management  Performed by  Anesthesiologist: Veronica Tuttle MD  CRNA: Miriam Chris CRNA  Preanesthetic Checklist  Completed: patient identified, IV checked, site marked, risks and benefits discussed, surgical consent, monitors and equipment checked, pre-op evaluation and timeout performed  Prep:  Pt Position: supine  Sterile barriers:cap, gloves, sterile barriers and mask  Prep: ChloraPrep  Patient monitoring: blood pressure monitoring, continuous pulse oximetry and EKG  Procedure    Sedation: yes  Performed under: general  Guidance:ultrasound guided  Images:still images obtained, printed/placed on chart    Laterality:Bilateral  Block Type:TAP  Injection Technique:single-shot  Needle Type:short-bevel and echogenic  Needle Gauge:20 G  Resistance on Injection: none    Medications Used: bupivacaine PF (MARCAINE) 0.25 % injection, 60 mL  buprenorphine (BUPRENEX) injection, 0.3 mg  dexamethasone sodium phosphate injection, 2 mg  Med administered at 3/15/2022 7:50 AM      Medications  Comment:Block Injection:  LA dose divided between Right and Left block        Post Assessment  Injection Assessment: negative aspiration for heme, incremental injection and no paresthesia on injection  Patient Tolerance:comfortable throughout block  Complications:no  Additional Notes      Under Ultrasound guidance, a BBraun 4inch 360 degree needle was advanced with Normal Saline hydro dissection of tissue.  The Internal Oblique and Transversus Abdominus muscles where visualized.  At or before the aponeurosis of Internal Oblique, local anesthetic spread was visualized in the Transversus Abdominus Plane. Injection was made incrementally with aspiration every 5 mls.  There was no  intravascular injection,   injection pressure was normal, there was no neural injection, and the procedure was completed without difficulty.  Thank You.

## 2022-03-15 NOTE — ANESTHESIA PROCEDURE NOTES
Airway  Urgency: elective    Date/Time: 3/15/2022 7:46 AM  Airway not difficult    General Information and Staff    Patient location during procedure: OR  CRNA: Miriam Chris CRNA    Indications and Patient Condition  Indications for airway management: airway protection    Preoxygenated: yes  MILS not maintained throughout  Mask difficulty assessment: 2 - vent by mask + OA or adjuvant +/- NMBA    Final Airway Details  Final airway type: endotracheal airway      Successful airway: ETT  Cuffed: yes   Successful intubation technique: direct laryngoscopy  Endotracheal tube insertion site: oral  Blade: Elaine  Blade size: 3  ETT size (mm): 7.0  Cormack-Lehane Classification: grade I - full view of glottis  Placement verified by: chest auscultation and capnometry   Cuff volume (mL): 5  Measured from: lips  ETT/EBT  to lips (cm): 21  Number of attempts at approach: 1  Assessment: lips, teeth, and gum same as pre-op and atraumatic intubation    Additional Comments  Negative epigastric sounds, Breath sound equal bilaterally with symmetric chest rise and fall

## 2022-03-15 NOTE — OP NOTE
DATE OF PROCEDURE: 03/15/22    PREOPERATIVE DIAGNOSIS:   1. Encounter for cosmetic surgery  2. Bilateral breast ptosis  3. Bilateral breast hypoplasia  4. Excess abdominal and leg adiposity     POSTOPERATIVE DIAGNOSIS:   1. Encounter for cosmetic surgery  2. Bilateral breast ptosis  3. Bilateral breast hypoplasia  4. Excess abdominal and leg adiposity     PROCEDURES PERFORMED:  1. Liposuction of medial thighs, knees and flanks.    2. Bilateral breast augmentation mastopexy  3. Abdominoplasty     SURGEON: Juan Diego Ramírez MD     ASSISTANT: Keven Aquino MD; Florencia Mosher PA     Circulator: Ho Cespedes RN; Yana Mcnally, CARLY; Keisha Tsai RN  Physician Assistant: Florencia Mosher PA  Scrub Person: Peggy Balbuena; Elyssa Huitron     ANESTHESIA: General.    INDICATIONS: The patient is a 36-year-old female who presented to my office with complaint of breast ptosis and excess abdominal skin and fat. She desired body contouring and the aforementioned procedures. The patient understood all of the risks and benefits of the procedure, including infection, need for future surgeries and elected to proceed. She had a heart attack over a year ago and was cleared by her cardiologist for surgery.      FINDINGS:  1. Removal of 200cc of lipoaspirate from each medial thigh.   2. Removal of 250cc of lipoaspirate from each flank  3. Removal of 150cc of lipoaspirate from each inner knee  4. Removal of 50cc of lipoaspirate from each superior knee  5. Removal of 145 grams of breast tissue from right breast and 165 grams from left breast  6. Removal of 9.8 pounds from abdomen  7. Placement of an Sientra-style 106  cc silicone implant on the right with a serial number of [id number].  8. Placement of an Sientra-style 106  cc silicone implant on the left with a serial number of [id number].         DESCRIPTION OF PROCEDURE: The patient was seen in preoperative holding and then marked in a standing position and taken to  the operating room by anesthesia after informed consent was signed and on the chart. She was given a 325 mg Aspirin by the Anesthesiologist due to her stent history. The patient was placed supine on the operating room table and general anesthesia was induced. Once verified, the case was then turned over to the surgical service. TAP blocks were performed by the anesthesia service.     We began on the right side to perform the breast augmentation part of the case. A 4cm incision was made within the planned vertical resection of tissue of the lift with a 15 blade scalpel on the left. We dissected down to pectoralis muscle. It was released in a Dual plane 1 technique. We then made a subpectoral pocket. Sizers were used and the Sientra  cc implant was selected. It was opened and soaked in antibiotic irrigation. I changed my gloves and a Pete funnel was opened. The implant was placed in the pocket with the Pete funnel in a no touch technique. We locked the fold down to the chest wall with a 2-0 Vicryl suture and then closed the incision in layers with monocryl.  I then moved to the left side and performed an identical procedure. A Dual plane 1 breast augmentation with a Sientra  cc implant. At this point, the incisions were then closed with 2-0 Vicryl in an interrupted fashion and then a 3-0 Monocryl and a 4-0 Monocryl to close the skin. Skin Affix glue was placed on the incisions.      We  next began with the mastopexy portion of the case. A 45mm cookie cutter was used on the right first. We then tailor tacked our marks with staples to make sure they were appropriately in place. After confirming our marks we continued on with the mastopexy. After scoring the areola we made a superior pedicle. This was deepithelialized. We then developed our skin flaps and resected lower pole breast tissue. At this point we rotated the nipple up and inset it with a 2-0 vicryl suture. We then performed central pillar  sutures. We last stapled the incisions closed temporarily. I moved to the left side and performed an identical procedure. We sat the patient up and was noted to have good symmetry. She was sat back down and we began closing the incisions with a 3-0 Monocryl and a 4-0 Monocryl sutures.      We next moved to the abdominoplasty portion of the case. I first performed flank liposuction bilaterally after infiltrating Tumescent solution without lidocaine. Then after making the lower abdominal incision with a 10 blade scalpel we disected up to the xiphoid process after freeing up her umbilicus. I then performed the plication portion of the case with tightening of her rectus diastasis. I used a large looped 0 nylon in a running fashion both above and below the umbilicus. I then used 0 Ethibond sutures to oversew the plication suture.  I then flexed the table at her waist and performed progressive tension sutures with a 2-0 vicryl in multiple different rows. We then excised a large section of her abdominal skin. Bovie cautery was used for bleeding. We closed the incision after placing two 15 Cambodian Marcos drain. The incision was closed first with 2-0 vicryl in Radha's fascia. Then 3-0 monocryl, Insorb stapler and  A 3-0 Stratafix suture in a running fashion. The umbilicus was brought out through a new position on her abdominal skin and sutured in to place with 4-0 Monocryl and a 5-0 Monocyrl. Skin afix skin glue was placed on all incisions.      I last performed liposuction of her medial thighs, knees and inner knee areas after Tumsecent solution was infiltrated. I then removed the aforementioned amounts.     She then had Kerlix fluffs and a surgical bra placed. She also had an abdominal binder placed as well. She also had her legs wrapped in ACE wraps.   She was awakened, extubated and taken to PACU in stable condition. All sponge and instrument counts were correct. I, Dr. Juan Diego Ramírez, was present and scrubbed for the  entire procedure.      SPECIMENS: Breast tissue.         ESTIMATED BLOOD LOSS: 100cc.     DRAINS: JPx2.      COMPLICATIONS: None immediate.     Juan Diego Ramírez MD  03/15/22  13:59 EDT

## 2022-03-16 VITALS
TEMPERATURE: 98.6 F | RESPIRATION RATE: 18 BRPM | HEART RATE: 86 BPM | SYSTOLIC BLOOD PRESSURE: 102 MMHG | OXYGEN SATURATION: 94 % | DIASTOLIC BLOOD PRESSURE: 62 MMHG

## 2022-03-16 PROCEDURE — 25010000002 MORPHINE PER 10 MG: Performed by: PLASTIC SURGERY

## 2022-03-16 PROCEDURE — 63710000001 PROMETHAZINE PER 12.5 MG: Performed by: PLASTIC SURGERY

## 2022-03-16 PROCEDURE — 25010000002 CEFAZOLIN IN DEXTROSE 2-4 GM/100ML-% SOLUTION: Performed by: PLASTIC SURGERY

## 2022-03-16 RX ADMIN — OXYCODONE HYDROCHLORIDE AND ACETAMINOPHEN 1 TABLET: 10; 325 TABLET ORAL at 05:52

## 2022-03-16 RX ADMIN — METOPROLOL TARTRATE 25 MG: 25 TABLET, FILM COATED ORAL at 08:12

## 2022-03-16 RX ADMIN — ACETAMINOPHEN 1000 MG: 500 TABLET ORAL at 05:52

## 2022-03-16 RX ADMIN — OXYCODONE HYDROCHLORIDE AND ACETAMINOPHEN 1 TABLET: 10; 325 TABLET ORAL at 10:25

## 2022-03-16 RX ADMIN — SODIUM CHLORIDE 50 ML/HR: 9 INJECTION, SOLUTION INTRAVENOUS at 05:47

## 2022-03-16 RX ADMIN — PROMETHAZINE HYDROCHLORIDE 6.25 MG: 12.5 TABLET ORAL at 08:20

## 2022-03-16 RX ADMIN — MORPHINE SULFATE 4 MG: 4 INJECTION, SOLUTION INTRAMUSCULAR; INTRAVENOUS at 08:20

## 2022-03-16 RX ADMIN — MORPHINE SULFATE 4 MG: 4 INJECTION, SOLUTION INTRAMUSCULAR; INTRAVENOUS at 01:08

## 2022-03-16 RX ADMIN — DIAZEPAM 5 MG: 5 TABLET ORAL at 00:51

## 2022-03-16 RX ADMIN — CEFAZOLIN SODIUM 2 G: 2 INJECTION, SOLUTION INTRAVENOUS at 03:00

## 2022-03-16 RX ADMIN — HYDROCHLOROTHIAZIDE: 12.5 CAPSULE ORAL at 08:12

## 2022-03-16 RX ADMIN — OXYCODONE HYDROCHLORIDE AND ACETAMINOPHEN 1 TABLET: 10; 325 TABLET ORAL at 02:05

## 2022-03-16 NOTE — PROGRESS NOTES
LOS: 0 days   Patient Care Team:  Rogelio Yanes MD as PCP - General (Family Medicine)    Chief Complaint:  Doing well. Pain controlled.     Subjective     Interval History:     Patient Complaints: none  Patient Denies:  Significant pain   History taken from: patient        Objective     Vital Signs  Temp:  [97.5 °F (36.4 °C)-98.3 °F (36.8 °C)] 98.3 °F (36.8 °C)  Heart Rate:  [] 83  Resp:  [16-20] 16  BP: (119-187)/() 145/84    Physical Exam:  GEN: NAD, AAOx3   Breast: soft, implants in good position. No hematomas. Nipples pink and sensate.   ABD: soft, skin flaps pink, viable. No hematomas. Incisions c/d/i. LIZ drains in place with serosanginous drainage.        Results Review:     I reviewed the patient's new clinical results.  Lab Results   Component Value Date    WBC 7.41 03/11/2022    HGB 14.7 03/11/2022    HCT 42.6 03/11/2022    MCV 87.8 03/11/2022     03/11/2022     Lab Results   Component Value Date    GLUCOSE 113 (H) 03/11/2022    CALCIUM 9.5 03/11/2022     03/11/2022    K 4.1 03/11/2022    CO2 26.0 03/11/2022     03/11/2022    BUN 11 03/11/2022    CREATININE 0.65 03/11/2022    EGFRIFAFRI 118 02/03/2017    EGFRIFNONA 115 03/17/2021    BCR 16.9 03/11/2022    ANIONGAP 10.0 03/11/2022   No results found for: PTT  Lab Results   Component Value Date    INR 0.99 03/11/2022    INR 1.05 03/17/2021    PROTIME 12.8 03/11/2022    PROTIME 13.4 03/17/2021         Assessment/Plan       Breast ptosis      1. D/c home  2. F/u on Monday in office  3. Leave dressing on until tomorrow. Can then take off and shower  4. Drain care  5. Scripts already filled  6. No lifting  7. Must walk and sleep bent at waist  8. Arixtra started today for 5 days  9. Restart aspirin per Cardiologist     Plan for disposition: home    Juan Diego Ramírez MD  03/16/22  07:26 EDT

## 2022-03-16 NOTE — PLAN OF CARE
Goal Outcome Evaluation:           Progress: improving  Outcome Evaluation: VSS. RA. Pt amb well. Pt voiding well. Pt tolerated reg diet. Ace wrap c/d/i. Abd binder in place. R LIZ w 70 mls serosang. L LIZ w 0 mls. Incentive spirometer at bedside. Scuds. PRN morphine/PRN percocet given for pain. PRN phenergan given for nausea. PRN Valium given for muscle spasms/anxiety. Pt calm/cooperative.

## 2022-03-17 LAB
QT INTERVAL: 398 MS
QTC INTERVAL: 459 MS

## 2022-10-17 ENCOUNTER — PRE-ADMISSION TESTING (OUTPATIENT)
Dept: PREADMISSION TESTING | Facility: HOSPITAL | Age: 37
End: 2022-10-17

## 2022-10-17 LAB
ANION GAP SERPL CALCULATED.3IONS-SCNC: 9 MMOL/L (ref 5–15)
BUN SERPL-MCNC: 10 MG/DL (ref 6–20)
BUN/CREAT SERPL: 16.9 (ref 7–25)
CALCIUM SPEC-SCNC: 8.9 MG/DL (ref 8.6–10.5)
CHLORIDE SERPL-SCNC: 106 MMOL/L (ref 98–107)
CO2 SERPL-SCNC: 24 MMOL/L (ref 22–29)
CREAT SERPL-MCNC: 0.59 MG/DL (ref 0.57–1)
DEPRECATED RDW RBC AUTO: 42.7 FL (ref 37–54)
EGFRCR SERPLBLD CKD-EPI 2021: 118.5 ML/MIN/1.73
ERYTHROCYTE [DISTWIDTH] IN BLOOD BY AUTOMATED COUNT: 13.2 % (ref 12.3–15.4)
GLUCOSE SERPL-MCNC: 96 MG/DL (ref 65–99)
HCT VFR BLD AUTO: 39.2 % (ref 34–46.6)
HGB BLD-MCNC: 13.4 G/DL (ref 12–15.9)
MCH RBC QN AUTO: 30.2 PG (ref 26.6–33)
MCHC RBC AUTO-ENTMCNC: 34.2 G/DL (ref 31.5–35.7)
MCV RBC AUTO: 88.5 FL (ref 79–97)
PLATELET # BLD AUTO: 282 10*3/MM3 (ref 140–450)
PMV BLD AUTO: 10 FL (ref 6–12)
POTASSIUM SERPL-SCNC: 4 MMOL/L (ref 3.5–5.2)
RBC # BLD AUTO: 4.43 10*6/MM3 (ref 3.77–5.28)
SODIUM SERPL-SCNC: 139 MMOL/L (ref 136–145)
WBC NRBC COR # BLD: 6.21 10*3/MM3 (ref 3.4–10.8)

## 2022-10-17 PROCEDURE — 85027 COMPLETE CBC AUTOMATED: CPT

## 2022-10-17 PROCEDURE — 36415 COLL VENOUS BLD VENIPUNCTURE: CPT

## 2022-10-17 PROCEDURE — 93010 ELECTROCARDIOGRAM REPORT: CPT | Performed by: INTERNAL MEDICINE

## 2022-10-17 PROCEDURE — 93005 ELECTROCARDIOGRAM TRACING: CPT

## 2022-10-17 PROCEDURE — 80048 BASIC METABOLIC PNL TOTAL CA: CPT

## 2022-10-17 RX ORDER — ALPRAZOLAM 1 MG/1
1 TABLET ORAL NIGHTLY PRN
COMMUNITY

## 2022-10-17 NOTE — PAT
Patient viewed general PAT education video as instructed in their preoperative information received from their surgeon.  Patient stated the general PAT education video was viewed in its entirety and survey completed.  Copies of [facility] general education handouts (Incentive Spirometry, Meds to Beds Program, Patient Belongings, Pre-op skin preparation instructions, Blood Glucose testing, Visitor policy, Surgery FAQ, Code H) distributed to patient if not printed. Education related to the PAT pass and skin preparation for surgery (if applicable) completed in PAT as a reinforcement to PAT education video. Patient instructed to return PAT pass provided today as well as completed skin preparation sheet (if applicable) on the day of procedure.     Additionally if patient had not viewed video yet but intended to view it at home or in our waiting area, then referred them to the handout with QR code/link provided during PAT visit.  Instructed patient to complete survey after viewing the video in its entirety.  Encouraged patient/family to read [facility] general education handouts thoroughly and notify PAT staff with any questions or concerns. Patient verbalized understanding of all information and priority content.    Patient to apply Chlorhexadine wipes  to surgical area (as instructed) the night before procedure and the AM of procedure. Wipes provided.    Patient instructed to drink 20 ounces of Gatorade and it needs to be completed 1 hour (for Main OR patients) or 2 hours (scheduled  section & [facility] patients) before given arrival time for procedure (NO RED Gatorade)    Patient verbalized understanding.    CARDIAC CLEARANCE ON CHART.

## 2022-10-19 LAB
QT INTERVAL: 398 MS
QTC INTERVAL: 444 MS

## 2022-10-24 ENCOUNTER — ANESTHESIA EVENT (OUTPATIENT)
Dept: PERIOP | Facility: HOSPITAL | Age: 37
End: 2022-10-24

## 2022-10-24 RX ORDER — FAMOTIDINE 10 MG/ML
20 INJECTION, SOLUTION INTRAVENOUS ONCE
Status: CANCELLED | OUTPATIENT
Start: 2022-10-24 | End: 2022-10-24

## 2022-10-24 RX ORDER — SODIUM CHLORIDE 0.9 % (FLUSH) 0.9 %
10 SYRINGE (ML) INJECTION EVERY 12 HOURS SCHEDULED
Status: CANCELLED | OUTPATIENT
Start: 2022-10-24

## 2022-10-24 RX ORDER — SODIUM CHLORIDE 0.9 % (FLUSH) 0.9 %
10 SYRINGE (ML) INJECTION AS NEEDED
Status: CANCELLED | OUTPATIENT
Start: 2022-10-24

## 2022-10-25 ENCOUNTER — ANESTHESIA (OUTPATIENT)
Dept: PERIOP | Facility: HOSPITAL | Age: 37
End: 2022-10-25

## 2022-10-25 ENCOUNTER — HOSPITAL ENCOUNTER (OUTPATIENT)
Facility: HOSPITAL | Age: 37
Discharge: HOME | End: 2022-10-25
Attending: PLASTIC SURGERY | Admitting: PLASTIC SURGERY

## 2022-10-25 VITALS
DIASTOLIC BLOOD PRESSURE: 90 MMHG | TEMPERATURE: 98.1 F | OXYGEN SATURATION: 96 % | WEIGHT: 210 LBS | HEART RATE: 90 BPM | BODY MASS INDEX: 35.85 KG/M2 | HEIGHT: 64 IN | SYSTOLIC BLOOD PRESSURE: 159 MMHG | RESPIRATION RATE: 16 BRPM

## 2022-10-25 DIAGNOSIS — N64.81 BREAST PTOSIS: ICD-10-CM

## 2022-10-25 LAB
B-HCG UR QL: NEGATIVE
EXPIRATION DATE: NORMAL
INTERNAL NEGATIVE CONTROL: NORMAL
INTERNAL POSITIVE CONTROL: NORMAL
Lab: NORMAL

## 2022-10-25 PROCEDURE — 25010000002 CEFAZOLIN IN DEXTROSE 2-4 GM/100ML-% SOLUTION: Performed by: PLASTIC SURGERY

## 2022-10-25 PROCEDURE — 88302 TISSUE EXAM BY PATHOLOGIST: CPT | Performed by: PLASTIC SURGERY

## 2022-10-25 PROCEDURE — 81025 URINE PREGNANCY TEST: CPT | Performed by: PLASTIC SURGERY

## 2022-10-25 PROCEDURE — 25010000002 HYDROMORPHONE 1 MG/ML SOLUTION

## 2022-10-25 PROCEDURE — 25010000002 CEFAZOLIN PER 500 MG: Performed by: NURSE ANESTHETIST, CERTIFIED REGISTERED

## 2022-10-25 PROCEDURE — 25010000002 EPINEPHRINE PER 0.1 MG: Performed by: PLASTIC SURGERY

## 2022-10-25 PROCEDURE — 25010000002 DEXAMETHASONE PER 1 MG: Performed by: NURSE ANESTHETIST, CERTIFIED REGISTERED

## 2022-10-25 PROCEDURE — 25010000002 DROPERIDOL PER 5 MG

## 2022-10-25 PROCEDURE — 0 LIDOCAINE 1 % SOLUTION 20 ML VIAL: Performed by: PLASTIC SURGERY

## 2022-10-25 PROCEDURE — 25010000002 FENTANYL CITRATE (PF) 100 MCG/2ML SOLUTION: Performed by: NURSE ANESTHETIST, CERTIFIED REGISTERED

## 2022-10-25 PROCEDURE — 25010000002 PROPOFOL 10 MG/ML EMULSION: Performed by: NURSE ANESTHETIST, CERTIFIED REGISTERED

## 2022-10-25 PROCEDURE — 25010000002 ONDANSETRON PER 1 MG: Performed by: NURSE ANESTHETIST, CERTIFIED REGISTERED

## 2022-10-25 PROCEDURE — 25010000002 FENTANYL CITRATE (PF) 50 MCG/ML SOLUTION

## 2022-10-25 DEVICE — DEV CONTRL TISS STRATAFIX SPIRAL MNCRYL UD 3/0 PLS 30CM: Type: IMPLANTABLE DEVICE | Site: BREAST | Status: FUNCTIONAL

## 2022-10-25 RX ORDER — MAGNESIUM HYDROXIDE 1200 MG/15ML
LIQUID ORAL AS NEEDED
Status: DISCONTINUED | OUTPATIENT
Start: 2022-10-25 | End: 2022-10-25 | Stop reason: HOSPADM

## 2022-10-25 RX ORDER — FENTANYL CITRATE 50 UG/ML
50 INJECTION, SOLUTION INTRAMUSCULAR; INTRAVENOUS
Status: DISCONTINUED | OUTPATIENT
Start: 2022-10-25 | End: 2022-10-25 | Stop reason: HOSPADM

## 2022-10-25 RX ORDER — PROMETHAZINE HYDROCHLORIDE 25 MG/1
25 TABLET ORAL ONCE AS NEEDED
Status: DISCONTINUED | OUTPATIENT
Start: 2022-10-25 | End: 2022-10-25 | Stop reason: HOSPADM

## 2022-10-25 RX ORDER — HYDRALAZINE HYDROCHLORIDE 20 MG/ML
5 INJECTION INTRAMUSCULAR; INTRAVENOUS
Status: DISCONTINUED | OUTPATIENT
Start: 2022-10-25 | End: 2022-10-25 | Stop reason: HOSPADM

## 2022-10-25 RX ORDER — DROPERIDOL 2.5 MG/ML
0.62 INJECTION, SOLUTION INTRAMUSCULAR; INTRAVENOUS
Status: DISCONTINUED | OUTPATIENT
Start: 2022-10-25 | End: 2022-10-25 | Stop reason: HOSPADM

## 2022-10-25 RX ORDER — IPRATROPIUM BROMIDE AND ALBUTEROL SULFATE 2.5; .5 MG/3ML; MG/3ML
3 SOLUTION RESPIRATORY (INHALATION) ONCE AS NEEDED
Status: DISCONTINUED | OUTPATIENT
Start: 2022-10-25 | End: 2022-10-25 | Stop reason: HOSPADM

## 2022-10-25 RX ORDER — SODIUM CHLORIDE 0.9 % (FLUSH) 0.9 %
3-10 SYRINGE (ML) INJECTION AS NEEDED
Status: DISCONTINUED | OUTPATIENT
Start: 2022-10-25 | End: 2022-10-25 | Stop reason: HOSPADM

## 2022-10-25 RX ORDER — HYDROCODONE BITARTRATE AND ACETAMINOPHEN 5; 325 MG/1; MG/1
1 TABLET ORAL ONCE AS NEEDED
Status: DISCONTINUED | OUTPATIENT
Start: 2022-10-25 | End: 2022-10-25 | Stop reason: HOSPADM

## 2022-10-25 RX ORDER — DEXAMETHASONE SODIUM PHOSPHATE 4 MG/ML
INJECTION, SOLUTION INTRA-ARTICULAR; INTRALESIONAL; INTRAMUSCULAR; INTRAVENOUS; SOFT TISSUE AS NEEDED
Status: DISCONTINUED | OUTPATIENT
Start: 2022-10-25 | End: 2022-10-25 | Stop reason: SURG

## 2022-10-25 RX ORDER — LIDOCAINE HYDROCHLORIDE 10 MG/ML
INJECTION, SOLUTION EPIDURAL; INFILTRATION; INTRACAUDAL; PERINEURAL AS NEEDED
Status: DISCONTINUED | OUTPATIENT
Start: 2022-10-25 | End: 2022-10-25 | Stop reason: SURG

## 2022-10-25 RX ORDER — HYDROMORPHONE HYDROCHLORIDE 1 MG/ML
0.5 INJECTION, SOLUTION INTRAMUSCULAR; INTRAVENOUS; SUBCUTANEOUS
Status: DISCONTINUED | OUTPATIENT
Start: 2022-10-25 | End: 2022-10-25 | Stop reason: HOSPADM

## 2022-10-25 RX ORDER — LIDOCAINE HYDROCHLORIDE AND EPINEPHRINE 10; 10 MG/ML; UG/ML
INJECTION, SOLUTION INFILTRATION; PERINEURAL AS NEEDED
Status: DISCONTINUED | OUTPATIENT
Start: 2022-10-25 | End: 2022-10-25 | Stop reason: HOSPADM

## 2022-10-25 RX ORDER — PROMETHAZINE HYDROCHLORIDE 25 MG/1
25 SUPPOSITORY RECTAL ONCE AS NEEDED
Status: DISCONTINUED | OUTPATIENT
Start: 2022-10-25 | End: 2022-10-25 | Stop reason: HOSPADM

## 2022-10-25 RX ORDER — MIDAZOLAM HYDROCHLORIDE 1 MG/ML
1 INJECTION INTRAMUSCULAR; INTRAVENOUS
Status: DISCONTINUED | OUTPATIENT
Start: 2022-10-25 | End: 2022-10-25 | Stop reason: HOSPADM

## 2022-10-25 RX ORDER — LABETALOL HYDROCHLORIDE 5 MG/ML
5 INJECTION, SOLUTION INTRAVENOUS
Status: DISCONTINUED | OUTPATIENT
Start: 2022-10-25 | End: 2022-10-25 | Stop reason: HOSPADM

## 2022-10-25 RX ORDER — LIDOCAINE HYDROCHLORIDE 10 MG/ML
0.5 INJECTION, SOLUTION EPIDURAL; INFILTRATION; INTRACAUDAL; PERINEURAL ONCE AS NEEDED
Status: COMPLETED | OUTPATIENT
Start: 2022-10-25 | End: 2022-10-25

## 2022-10-25 RX ORDER — DROPERIDOL 2.5 MG/ML
INJECTION, SOLUTION INTRAMUSCULAR; INTRAVENOUS
Status: COMPLETED
Start: 2022-10-25 | End: 2022-10-25

## 2022-10-25 RX ORDER — MEPERIDINE HYDROCHLORIDE 25 MG/ML
12.5 INJECTION INTRAMUSCULAR; INTRAVENOUS; SUBCUTANEOUS
Status: DISCONTINUED | OUTPATIENT
Start: 2022-10-25 | End: 2022-10-25 | Stop reason: HOSPADM

## 2022-10-25 RX ORDER — CEFAZOLIN SODIUM 1 G/3ML
INJECTION, POWDER, FOR SOLUTION INTRAMUSCULAR; INTRAVENOUS AS NEEDED
Status: DISCONTINUED | OUTPATIENT
Start: 2022-10-25 | End: 2022-10-25 | Stop reason: SURG

## 2022-10-25 RX ORDER — SODIUM CHLORIDE 0.9 % (FLUSH) 0.9 %
3 SYRINGE (ML) INJECTION EVERY 12 HOURS SCHEDULED
Status: DISCONTINUED | OUTPATIENT
Start: 2022-10-25 | End: 2022-10-25 | Stop reason: HOSPADM

## 2022-10-25 RX ORDER — ONDANSETRON 2 MG/ML
INJECTION INTRAMUSCULAR; INTRAVENOUS AS NEEDED
Status: DISCONTINUED | OUTPATIENT
Start: 2022-10-25 | End: 2022-10-25 | Stop reason: SURG

## 2022-10-25 RX ORDER — SODIUM CHLORIDE, SODIUM LACTATE, POTASSIUM CHLORIDE, CALCIUM CHLORIDE 600; 310; 30; 20 MG/100ML; MG/100ML; MG/100ML; MG/100ML
9 INJECTION, SOLUTION INTRAVENOUS CONTINUOUS
Status: DISCONTINUED | OUTPATIENT
Start: 2022-10-25 | End: 2022-10-25 | Stop reason: HOSPADM

## 2022-10-25 RX ORDER — ONDANSETRON 2 MG/ML
4 INJECTION INTRAMUSCULAR; INTRAVENOUS ONCE AS NEEDED
Status: DISCONTINUED | OUTPATIENT
Start: 2022-10-25 | End: 2022-10-25 | Stop reason: HOSPADM

## 2022-10-25 RX ORDER — FENTANYL CITRATE 50 UG/ML
INJECTION, SOLUTION INTRAMUSCULAR; INTRAVENOUS
Status: COMPLETED
Start: 2022-10-25 | End: 2022-10-25

## 2022-10-25 RX ORDER — PROPOFOL 10 MG/ML
VIAL (ML) INTRAVENOUS AS NEEDED
Status: DISCONTINUED | OUTPATIENT
Start: 2022-10-25 | End: 2022-10-25 | Stop reason: SURG

## 2022-10-25 RX ORDER — DROPERIDOL 2.5 MG/ML
0.62 INJECTION, SOLUTION INTRAMUSCULAR; INTRAVENOUS ONCE AS NEEDED
Status: DISCONTINUED | OUTPATIENT
Start: 2022-10-25 | End: 2022-10-25 | Stop reason: HOSPADM

## 2022-10-25 RX ORDER — CEFAZOLIN SODIUM 2 G/100ML
2 INJECTION, SOLUTION INTRAVENOUS ONCE
Status: COMPLETED | OUTPATIENT
Start: 2022-10-25 | End: 2022-10-25

## 2022-10-25 RX ORDER — SODIUM CHLORIDE, SODIUM LACTATE, POTASSIUM CHLORIDE, CALCIUM CHLORIDE 600; 310; 30; 20 MG/100ML; MG/100ML; MG/100ML; MG/100ML
INJECTION, SOLUTION INTRAVENOUS CONTINUOUS PRN
Status: DISCONTINUED | OUTPATIENT
Start: 2022-10-25 | End: 2022-10-25 | Stop reason: SURG

## 2022-10-25 RX ORDER — FENTANYL CITRATE 50 UG/ML
INJECTION, SOLUTION INTRAMUSCULAR; INTRAVENOUS AS NEEDED
Status: DISCONTINUED | OUTPATIENT
Start: 2022-10-25 | End: 2022-10-25 | Stop reason: SURG

## 2022-10-25 RX ORDER — NALOXONE HCL 0.4 MG/ML
0.4 VIAL (ML) INJECTION AS NEEDED
Status: DISCONTINUED | OUTPATIENT
Start: 2022-10-25 | End: 2022-10-25 | Stop reason: HOSPADM

## 2022-10-25 RX ORDER — ROCURONIUM BROMIDE 10 MG/ML
INJECTION, SOLUTION INTRAVENOUS AS NEEDED
Status: DISCONTINUED | OUTPATIENT
Start: 2022-10-25 | End: 2022-10-25 | Stop reason: SURG

## 2022-10-25 RX ORDER — HYDROCODONE BITARTRATE AND ACETAMINOPHEN 5; 325 MG/1; MG/1
TABLET ORAL
Status: COMPLETED
Start: 2022-10-25 | End: 2022-10-25

## 2022-10-25 RX ORDER — ESMOLOL HYDROCHLORIDE 10 MG/ML
INJECTION INTRAVENOUS AS NEEDED
Status: DISCONTINUED | OUTPATIENT
Start: 2022-10-25 | End: 2022-10-25 | Stop reason: SURG

## 2022-10-25 RX ORDER — FAMOTIDINE 20 MG/1
20 TABLET, FILM COATED ORAL ONCE
Status: COMPLETED | OUTPATIENT
Start: 2022-10-25 | End: 2022-10-25

## 2022-10-25 RX ADMIN — HYDROMORPHONE HYDROCHLORIDE 0.5 MG: 1 INJECTION, SOLUTION INTRAMUSCULAR; INTRAVENOUS; SUBCUTANEOUS at 11:56

## 2022-10-25 RX ADMIN — FENTANYL CITRATE 50 MCG: 50 INJECTION, SOLUTION INTRAMUSCULAR; INTRAVENOUS at 12:11

## 2022-10-25 RX ADMIN — LIDOCAINE HYDROCHLORIDE 0.5 ML: 10 INJECTION, SOLUTION EPIDURAL; INFILTRATION; INTRACAUDAL; PERINEURAL at 07:05

## 2022-10-25 RX ADMIN — DROPERIDOL 0.62 MG: 2.5 INJECTION, SOLUTION INTRAMUSCULAR; INTRAVENOUS at 12:04

## 2022-10-25 RX ADMIN — ROCURONIUM BROMIDE 30 MG: 10 SOLUTION INTRAVENOUS at 08:26

## 2022-10-25 RX ADMIN — FENTANYL CITRATE 50 MCG: 50 INJECTION, SOLUTION INTRAMUSCULAR; INTRAVENOUS at 08:27

## 2022-10-25 RX ADMIN — SODIUM CHLORIDE, POTASSIUM CHLORIDE, SODIUM LACTATE AND CALCIUM CHLORIDE: 600; 310; 30; 20 INJECTION, SOLUTION INTRAVENOUS at 07:39

## 2022-10-25 RX ADMIN — ROCURONIUM BROMIDE 20 MG: 10 SOLUTION INTRAVENOUS at 09:50

## 2022-10-25 RX ADMIN — FENTANYL CITRATE 50 MCG: 50 INJECTION, SOLUTION INTRAMUSCULAR; INTRAVENOUS at 11:45

## 2022-10-25 RX ADMIN — CEFAZOLIN SODIUM 2 G: 1 INJECTION, POWDER, FOR SOLUTION INTRAMUSCULAR; INTRAVENOUS at 10:43

## 2022-10-25 RX ADMIN — ESMOLOL HYDROCHLORIDE 20 MG: 10 INJECTION, SOLUTION INTRAVENOUS at 08:31

## 2022-10-25 RX ADMIN — FENTANYL CITRATE 100 MCG: 50 INJECTION, SOLUTION INTRAMUSCULAR; INTRAVENOUS at 07:46

## 2022-10-25 RX ADMIN — SODIUM CHLORIDE, POTASSIUM CHLORIDE, SODIUM LACTATE AND CALCIUM CHLORIDE: 600; 310; 30; 20 INJECTION, SOLUTION INTRAVENOUS at 11:09

## 2022-10-25 RX ADMIN — CEFAZOLIN SODIUM 2 G: 2 INJECTION, SOLUTION INTRAVENOUS at 07:44

## 2022-10-25 RX ADMIN — ESMOLOL HYDROCHLORIDE 10 MG: 10 INJECTION, SOLUTION INTRAVENOUS at 11:01

## 2022-10-25 RX ADMIN — HYDROCODONE BITARTRATE AND ACETAMINOPHEN 1 TABLET: 5; 325 TABLET ORAL at 12:23

## 2022-10-25 RX ADMIN — DEXAMETHASONE SODIUM PHOSPHATE 8 MG: 4 INJECTION, SOLUTION INTRAMUSCULAR; INTRAVENOUS at 08:01

## 2022-10-25 RX ADMIN — PROPOFOL 25 MCG/KG/MIN: 10 INJECTION, EMULSION INTRAVENOUS at 07:17

## 2022-10-25 RX ADMIN — SODIUM CHLORIDE, POTASSIUM CHLORIDE, SODIUM LACTATE AND CALCIUM CHLORIDE 9 ML/HR: 600; 310; 30; 20 INJECTION, SOLUTION INTRAVENOUS at 07:05

## 2022-10-25 RX ADMIN — ONDANSETRON 4 MG: 2 INJECTION INTRAMUSCULAR; INTRAVENOUS at 11:07

## 2022-10-25 RX ADMIN — ROCURONIUM BROMIDE 50 MG: 10 SOLUTION INTRAVENOUS at 07:47

## 2022-10-25 RX ADMIN — FENTANYL CITRATE 50 MCG: 50 INJECTION, SOLUTION INTRAMUSCULAR; INTRAVENOUS at 08:54

## 2022-10-25 RX ADMIN — FAMOTIDINE 20 MG: 20 TABLET ORAL at 07:05

## 2022-10-25 RX ADMIN — SUGAMMADEX 200 MG: 100 INJECTION, SOLUTION INTRAVENOUS at 11:07

## 2022-10-25 RX ADMIN — LIDOCAINE HYDROCHLORIDE 50 MG: 10 INJECTION, SOLUTION EPIDURAL; INFILTRATION; INTRACAUDAL; PERINEURAL at 07:46

## 2022-10-25 RX ADMIN — PROPOFOL 200 MG: 10 INJECTION, EMULSION INTRAVENOUS at 07:46

## 2022-10-25 NOTE — ANESTHESIA PROCEDURE NOTES
Airway  Urgency: elective    Date/Time: 10/25/2022 7:48 AM  Airway not difficult    General Information and Staff    Patient location during procedure: OR  CRNA/CAA: Ree Ponce CRNA    Indications and Patient Condition  Indications for airway management: airway protection    Preoxygenated: yes  MILS not maintained throughout  Mask difficulty assessment: 1 - vent by mask    Final Airway Details  Final airway type: endotracheal airway      Successful airway: ETT  Cuffed: yes   Successful intubation technique: direct laryngoscopy  Facilitating devices/methods: intubating stylet and cricoid pressure  Endotracheal tube insertion site: oral  Blade: Elaine  Blade size: 3  ETT size (mm): 7.0  Cormack-Lehane Classification: grade IIa - partial view of glottis  Placement verified by: chest auscultation and capnometry   Measured from: lips  ETT/EBT  to lips (cm): 21  Number of attempts at approach: 1  Assessment: lips, teeth, and gum same as pre-op and atraumatic intubation    Additional Comments  Negative epigastric sounds, Breath sound equal bilaterally with symmetric chest rise and fall

## 2022-10-25 NOTE — ANESTHESIA POSTPROCEDURE EVALUATION
Patient: Souleymane Grossman    Procedure Summary     Date: 10/25/22 Room / Location:  [facility] /  [facility]    Anesthesia Start: 0739 Anesthesia Stop: 1127    Procedures:       BILATERAL BREAST LIFT REVISION (Bilateral: Breast)      LIPOSUCTION OF NECK (Chin) Diagnosis:     Surgeons: Juan Diego Ramírez MD Provider: Juan Manuel Duran MD    Anesthesia Type: general ASA Status: 3          Anesthesia Type: general    Vitals  Vitals Value Taken Time   /78 10/25/22 1124   Temp     Pulse 108 10/25/22 1126   Resp     SpO2 95 % 10/25/22 1126   Vitals shown include unvalidated device data.        Post Anesthesia Care and Evaluation    Patient location during evaluation: PACU  Patient participation: complete - patient participated  Level of consciousness: sleepy but conscious  Pain management: adequate    Airway patency: patent  Anesthetic complications: No anesthetic complications  PONV Status: none  Cardiovascular status: hemodynamically stable and acceptable  Respiratory status: nonlabored ventilation, acceptable, nasal cannula and oral airway  Hydration status: acceptable    Comments: Temp 96.9

## 2022-10-25 NOTE — ANESTHESIA PREPROCEDURE EVALUATION
Anesthesia Evaluation     Patient summary reviewed and Nursing notes reviewed   no history of anesthetic complications:  NPO Solid Status: > 8 hours  NPO Liquid Status: > 2 hours           Airway   Mallampati: II  TM distance: >3 FB  No difficulty expected  Dental - normal exam     Pulmonary - negative pulmonary ROS and normal exam    breath sounds clear to auscultation  Cardiovascular - normal exam    ECG reviewed  Rhythm: regular  Rate: normal    (+) hypertension, CAD, cardiac stents (off ASA x 3 weeks, stopped by cardiologist)     ROS comment: 3/21 Echo:  ? Global and segmental LV wall motion is normal. Calculated left ventricular ejection fraction is 67%.  ? There is NO evidence of asymmetric septal hypertrophy.  ? The mitral and aortic valves are normal in structure and function.  ? Left atrial volume is mildly increased.  ? There is no evidence of pulmonary artery hypertension.  ? There are no other important findings on this study.      Neuro/Psych- negative ROS  GI/Hepatic/Renal/Endo    (+) morbid obesity,      Musculoskeletal     Abdominal    Substance History      OB/GYN          Other                        Anesthesia Plan    ASA 3     general     (Discussed block with patient who states she tolerates pain well and wishes not to have blocks)  intravenous induction     Anesthetic plan, risks, benefits, and alternatives have been provided, discussed and informed consent has been obtained with: patient.    Plan discussed with CRNA.        CODE STATUS:

## 2022-11-22 ENCOUNTER — TRANSCRIBE ORDERS (OUTPATIENT)
Dept: LAB | Facility: HOSPITAL | Age: 37
End: 2022-11-22

## 2022-11-22 ENCOUNTER — LAB (OUTPATIENT)
Dept: LAB | Facility: HOSPITAL | Age: 37
End: 2022-11-22

## 2022-11-22 DIAGNOSIS — N92.4 PREMENOPAUSAL MENORRHAGIA: Primary | ICD-10-CM

## 2022-11-22 LAB
DEPRECATED RDW RBC AUTO: 40.7 FL (ref 37–54)
ERYTHROCYTE [DISTWIDTH] IN BLOOD BY AUTOMATED COUNT: 12.8 % (ref 12.3–15.4)
HCT VFR BLD AUTO: 38.7 % (ref 34–46.6)
HGB BLD-MCNC: 13.2 G/DL (ref 12–15.9)
MCH RBC QN AUTO: 30.2 PG (ref 26.6–33)
MCHC RBC AUTO-ENTMCNC: 34.1 G/DL (ref 31.5–35.7)
MCV RBC AUTO: 88.6 FL (ref 79–97)
PLATELET # BLD AUTO: 273 10*3/MM3 (ref 140–450)
PMV BLD AUTO: 10.7 FL (ref 6–12)
RBC # BLD AUTO: 4.37 10*6/MM3 (ref 3.77–5.28)
TSH SERPL DL<=0.05 MIU/L-ACNC: 1.43 UIU/ML (ref 0.27–4.2)
WBC NRBC COR # BLD: 7.75 10*3/MM3 (ref 3.4–10.8)

## 2022-11-22 PROCEDURE — 36415 COLL VENOUS BLD VENIPUNCTURE: CPT | Performed by: OBSTETRICS & GYNECOLOGY

## 2022-11-22 PROCEDURE — 84443 ASSAY THYROID STIM HORMONE: CPT | Performed by: OBSTETRICS & GYNECOLOGY

## 2022-11-22 PROCEDURE — 85027 COMPLETE CBC AUTOMATED: CPT | Performed by: OBSTETRICS & GYNECOLOGY

## 2023-03-09 ENCOUNTER — PREP FOR SURGERY (OUTPATIENT)
Dept: OTHER | Facility: HOSPITAL | Age: 38
End: 2023-03-09
Payer: MEDICAID

## 2023-03-09 DIAGNOSIS — N80.9 ENDOMETRIOSIS: ICD-10-CM

## 2023-03-09 DIAGNOSIS — N99.85 POST ENDOMETRIAL ABLATION SYNDROME: Primary | ICD-10-CM

## 2023-03-09 RX ORDER — GABAPENTIN 300 MG/1
600 CAPSULE ORAL ONCE
Status: CANCELLED | OUTPATIENT
Start: 2023-03-09 | End: 2023-03-09

## 2023-03-09 RX ORDER — SODIUM CHLORIDE 0.9 % (FLUSH) 0.9 %
3 SYRINGE (ML) INJECTION EVERY 12 HOURS SCHEDULED
Status: CANCELLED | OUTPATIENT
Start: 2023-03-09

## 2023-03-09 RX ORDER — CEFAZOLIN SODIUM 2 G/100ML
2 INJECTION, SOLUTION INTRAVENOUS ONCE
Status: CANCELLED | OUTPATIENT
Start: 2023-03-09 | End: 2023-03-09

## 2023-03-09 RX ORDER — SODIUM CHLORIDE 0.9 % (FLUSH) 0.9 %
10 SYRINGE (ML) INJECTION AS NEEDED
Status: CANCELLED | OUTPATIENT
Start: 2023-03-09

## 2023-03-09 RX ORDER — SODIUM CHLORIDE 9 MG/ML
40 INJECTION, SOLUTION INTRAVENOUS AS NEEDED
Status: CANCELLED | OUTPATIENT
Start: 2023-03-09

## 2023-03-09 RX ORDER — HEPARIN SODIUM 5000 [USP'U]/ML
5000 INJECTION, SOLUTION INTRAVENOUS; SUBCUTANEOUS ONCE
Status: CANCELLED | OUTPATIENT
Start: 2023-03-09 | End: 2023-03-09

## 2023-03-09 RX ORDER — ACETAMINOPHEN 500 MG
1000 TABLET ORAL ONCE
Status: CANCELLED | OUTPATIENT
Start: 2023-03-09 | End: 2023-03-09

## 2023-03-28 ENCOUNTER — PRE-ADMISSION TESTING (OUTPATIENT)
Dept: PREADMISSION TESTING | Facility: HOSPITAL | Age: 38
End: 2023-03-28
Payer: MEDICAID

## 2023-03-28 VITALS — HEIGHT: 64 IN | WEIGHT: 232.59 LBS | BODY MASS INDEX: 39.71 KG/M2

## 2023-03-28 DIAGNOSIS — N99.85 POST ENDOMETRIAL ABLATION SYNDROME: ICD-10-CM

## 2023-03-28 LAB
BACTERIA UR QL AUTO: ABNORMAL /HPF
BASOPHILS # BLD AUTO: 0.03 10*3/MM3 (ref 0–0.2)
BASOPHILS NFR BLD AUTO: 0.3 % (ref 0–1.5)
BILIRUB UR QL STRIP: NEGATIVE
CLARITY UR: ABNORMAL
COLOR UR: YELLOW
DEPRECATED RDW RBC AUTO: 43.9 FL (ref 37–54)
EOSINOPHIL # BLD AUTO: 0.08 10*3/MM3 (ref 0–0.4)
EOSINOPHIL NFR BLD AUTO: 0.8 % (ref 0.3–6.2)
ERYTHROCYTE [DISTWIDTH] IN BLOOD BY AUTOMATED COUNT: 13.3 % (ref 12.3–15.4)
GLUCOSE UR STRIP-MCNC: NEGATIVE MG/DL
HCT VFR BLD AUTO: 41.3 % (ref 34–46.6)
HGB BLD-MCNC: 13.9 G/DL (ref 12–15.9)
HGB UR QL STRIP.AUTO: NEGATIVE
HYALINE CASTS UR QL AUTO: ABNORMAL /LPF
IMM GRANULOCYTES # BLD AUTO: 0.03 10*3/MM3 (ref 0–0.05)
IMM GRANULOCYTES NFR BLD AUTO: 0.3 % (ref 0–0.5)
KETONES UR QL STRIP: NEGATIVE
LEUKOCYTE ESTERASE UR QL STRIP.AUTO: NEGATIVE
LYMPHOCYTES # BLD AUTO: 2.33 10*3/MM3 (ref 0.7–3.1)
LYMPHOCYTES NFR BLD AUTO: 24.7 % (ref 19.6–45.3)
MCH RBC QN AUTO: 30.2 PG (ref 26.6–33)
MCHC RBC AUTO-ENTMCNC: 33.7 G/DL (ref 31.5–35.7)
MCV RBC AUTO: 89.8 FL (ref 79–97)
MONOCYTES # BLD AUTO: 0.64 10*3/MM3 (ref 0.1–0.9)
MONOCYTES NFR BLD AUTO: 6.8 % (ref 5–12)
NEUTROPHILS NFR BLD AUTO: 6.31 10*3/MM3 (ref 1.7–7)
NEUTROPHILS NFR BLD AUTO: 67.1 % (ref 42.7–76)
NITRITE UR QL STRIP: NEGATIVE
NRBC BLD AUTO-RTO: 0 /100 WBC (ref 0–0.2)
PH UR STRIP.AUTO: 8.5 [PH] (ref 5–8)
PLATELET # BLD AUTO: 299 10*3/MM3 (ref 140–450)
PMV BLD AUTO: 10.1 FL (ref 6–12)
PROT UR QL STRIP: NEGATIVE
RBC # BLD AUTO: 4.6 10*6/MM3 (ref 3.77–5.28)
RBC # UR STRIP: ABNORMAL /HPF
REF LAB TEST METHOD: ABNORMAL
SP GR UR STRIP: 1.02 (ref 1–1.03)
SQUAMOUS #/AREA URNS HPF: ABNORMAL /HPF
UROBILINOGEN UR QL STRIP: ABNORMAL
WBC # UR STRIP: ABNORMAL /HPF
WBC NRBC COR # BLD: 9.42 10*3/MM3 (ref 3.4–10.8)

## 2023-03-28 PROCEDURE — 85025 COMPLETE CBC W/AUTO DIFF WBC: CPT

## 2023-03-28 PROCEDURE — 36415 COLL VENOUS BLD VENIPUNCTURE: CPT

## 2023-03-28 PROCEDURE — 81001 URINALYSIS AUTO W/SCOPE: CPT

## 2023-03-28 NOTE — PAT
Patient viewed general PAT education video as instructed in their preoperative information received from their surgeon.  Patient stated the general PAT education video was viewed in its entirety and survey completed.  Copies of PAT general education handouts (Incentive Spirometry, Meds to Beds Program, Patient Belongings, Pre-op skin preparation instructions, Blood Glucose testing, Visitor policy, Surgery FAQ, Code H) distributed to patient if not printed. Education related to the PAT pass and skin preparation for surgery (if applicable) completed in PAT as a reinforcement to PAT education video. Patient instructed to return PAT pass provided today as well as completed skin preparation sheet (if applicable) on the day of procedure.     Additionally if patient had not viewed video yet but intended to view it at home or in our waiting area, then referred them to the handout with QR code/link provided during PAT visit.  Instructed patient to complete survey after viewing the video in its entirety.  Encouraged patient/family to read PAT general education handouts thoroughly and notify PAT staff with any questions or concerns. Patient verbalized understanding of all information and priority content.    An arrival time for procedure was not provided during PAT visit. If patient had any questions or concerns about their arrival time, they were instructed to contact their surgeon/physician.  Additionally, if the patient referred to an arrival time that was acquired from their my chart account, patient was encouraged to verify that time with their surgeon/physician. Arrival times are NOT provided in Pre Admission Testing Department.    Patient denies any current skin issues.     Patient instructed to drink 20 ounces of Gatorade and it needs to be completed 1 hour (for Main OR patients) or 2 hours (scheduled  section & [facility] patients) before given arrival time for procedure (NO RED Gatorade)    Patient verbalized  understanding.    Patient to apply Chlorhexadine wipes  to surgical area (as instructed) the night before procedure and the AM of procedure. Wipes provided.    Instructed patient to take two Tylenol extra strength (total of 1000 mg) the night before surgery.    CARDIAC RISK IN [facility] FROM DR ANTHONY ON 8/23/22. PATIENT DENIES CHEST PAIN AND SHORTNESS OF BREATH  EKG IN [facility] FROM 10/17/22

## 2023-03-29 ENCOUNTER — ANESTHESIA EVENT (OUTPATIENT)
Dept: PERIOP | Facility: HOSPITAL | Age: 38
End: 2023-03-29
Payer: MEDICAID

## 2023-03-29 RX ORDER — FAMOTIDINE 10 MG/ML
20 INJECTION, SOLUTION INTRAVENOUS ONCE
Status: CANCELLED | OUTPATIENT
Start: 2023-03-29 | End: 2023-03-29

## 2023-03-30 ENCOUNTER — ANESTHESIA (OUTPATIENT)
Dept: PERIOP | Facility: HOSPITAL | Age: 38
End: 2023-03-30
Payer: MEDICAID

## 2023-03-30 ENCOUNTER — HOSPITAL ENCOUNTER (OUTPATIENT)
Facility: HOSPITAL | Age: 38
Discharge: HOME | End: 2023-03-30
Attending: OBSTETRICS & GYNECOLOGY | Admitting: OBSTETRICS & GYNECOLOGY
Payer: MEDICAID

## 2023-03-30 VITALS
DIASTOLIC BLOOD PRESSURE: 80 MMHG | SYSTOLIC BLOOD PRESSURE: 152 MMHG | OXYGEN SATURATION: 96 % | TEMPERATURE: 98.9 F | RESPIRATION RATE: 16 BRPM | HEART RATE: 87 BPM

## 2023-03-30 DIAGNOSIS — N99.85 POST ENDOMETRIAL ABLATION SYNDROME: Primary | ICD-10-CM

## 2023-03-30 PROBLEM — N80.9 ENDOMETRIOSIS: Status: ACTIVE | Noted: 2023-03-30

## 2023-03-30 LAB
B-HCG UR QL: NEGATIVE
EXPIRATION DATE: NORMAL
INTERNAL NEGATIVE CONTROL: NEGATIVE
INTERNAL POSITIVE CONTROL: POSITIVE
Lab: NORMAL

## 2023-03-30 PROCEDURE — 25010000002 DEXAMETHASONE PER 1 MG: Performed by: ANESTHESIOLOGY

## 2023-03-30 PROCEDURE — 88307 TISSUE EXAM BY PATHOLOGIST: CPT | Performed by: OBSTETRICS & GYNECOLOGY

## 2023-03-30 PROCEDURE — 25010000002 ONDANSETRON PER 1 MG

## 2023-03-30 PROCEDURE — 25010000002 FENTANYL CITRATE (PF) 50 MCG/ML SOLUTION

## 2023-03-30 PROCEDURE — 25010000002 HYDROMORPHONE 1 MG/ML SOLUTION

## 2023-03-30 PROCEDURE — 25010000002 FENTANYL CITRATE (PF) 100 MCG/2ML SOLUTION: Performed by: ANESTHESIOLOGY

## 2023-03-30 PROCEDURE — 25010000002 KETOROLAC TROMETHAMINE PER 15 MG: Performed by: ANESTHESIOLOGY

## 2023-03-30 PROCEDURE — G0378 HOSPITAL OBSERVATION PER HR: HCPCS

## 2023-03-30 PROCEDURE — 81025 URINE PREGNANCY TEST: CPT | Performed by: OBSTETRICS & GYNECOLOGY

## 2023-03-30 PROCEDURE — 25010000002 PROPOFOL 10 MG/ML EMULSION: Performed by: ANESTHESIOLOGY

## 2023-03-30 PROCEDURE — 25010000002 CEFAZOLIN IN DEXTROSE 2-4 GM/100ML-% SOLUTION: Performed by: OBSTETRICS & GYNECOLOGY

## 2023-03-30 PROCEDURE — 25010000002 HEPARIN (PORCINE) PER 1000 UNITS: Performed by: OBSTETRICS & GYNECOLOGY

## 2023-03-30 RX ORDER — ONDANSETRON 2 MG/ML
INJECTION INTRAMUSCULAR; INTRAVENOUS
Status: COMPLETED
Start: 2023-03-30 | End: 2023-03-30

## 2023-03-30 RX ORDER — FAMOTIDINE 20 MG/1
20 TABLET, FILM COATED ORAL ONCE
Status: COMPLETED | OUTPATIENT
Start: 2023-03-30 | End: 2023-03-30

## 2023-03-30 RX ORDER — LIDOCAINE HYDROCHLORIDE 10 MG/ML
0.5 INJECTION, SOLUTION EPIDURAL; INFILTRATION; INTRACAUDAL; PERINEURAL ONCE AS NEEDED
Status: COMPLETED | OUTPATIENT
Start: 2023-03-30 | End: 2023-03-30

## 2023-03-30 RX ORDER — FENTANYL CITRATE 50 UG/ML
50 INJECTION, SOLUTION INTRAMUSCULAR; INTRAVENOUS
Status: DISCONTINUED | OUTPATIENT
Start: 2023-03-30 | End: 2023-03-30 | Stop reason: HOSPADM

## 2023-03-30 RX ORDER — MULTIPLE VITAMINS W/ MINERALS TAB 9MG-400MCG
1 TAB ORAL DAILY
COMMUNITY

## 2023-03-30 RX ORDER — LIDOCAINE HYDROCHLORIDE 10 MG/ML
INJECTION, SOLUTION EPIDURAL; INFILTRATION; INTRACAUDAL; PERINEURAL AS NEEDED
Status: DISCONTINUED | OUTPATIENT
Start: 2023-03-30 | End: 2023-03-30 | Stop reason: SURG

## 2023-03-30 RX ORDER — FENTANYL CITRATE 50 UG/ML
INJECTION, SOLUTION INTRAMUSCULAR; INTRAVENOUS
Status: COMPLETED
Start: 2023-03-30 | End: 2023-03-30

## 2023-03-30 RX ORDER — ACETAMINOPHEN 325 MG/1
650 TABLET ORAL EVERY 4 HOURS PRN
Qty: 100 TABLET | Refills: 0 | Status: SHIPPED | OUTPATIENT
Start: 2023-03-30

## 2023-03-30 RX ORDER — ACETAMINOPHEN 500 MG
1000 TABLET ORAL ONCE
Status: COMPLETED | OUTPATIENT
Start: 2023-03-30 | End: 2023-03-30

## 2023-03-30 RX ORDER — PROPOFOL 10 MG/ML
VIAL (ML) INTRAVENOUS AS NEEDED
Status: DISCONTINUED | OUTPATIENT
Start: 2023-03-30 | End: 2023-03-30 | Stop reason: SURG

## 2023-03-30 RX ORDER — ROCURONIUM BROMIDE 10 MG/ML
INJECTION, SOLUTION INTRAVENOUS AS NEEDED
Status: DISCONTINUED | OUTPATIENT
Start: 2023-03-30 | End: 2023-03-30 | Stop reason: SURG

## 2023-03-30 RX ORDER — HEPARIN SODIUM 5000 [USP'U]/ML
5000 INJECTION, SOLUTION INTRAVENOUS; SUBCUTANEOUS ONCE
Status: DISCONTINUED | OUTPATIENT
Start: 2023-03-30 | End: 2023-03-30 | Stop reason: HOSPADM

## 2023-03-30 RX ORDER — ONDANSETRON 2 MG/ML
4 INJECTION INTRAMUSCULAR; INTRAVENOUS ONCE AS NEEDED
Status: COMPLETED | OUTPATIENT
Start: 2023-03-30 | End: 2023-03-30

## 2023-03-30 RX ORDER — GABAPENTIN 300 MG/1
600 CAPSULE ORAL ONCE
Status: COMPLETED | OUTPATIENT
Start: 2023-03-30 | End: 2023-03-30

## 2023-03-30 RX ORDER — DEXAMETHASONE SODIUM PHOSPHATE 4 MG/ML
INJECTION, SOLUTION INTRA-ARTICULAR; INTRALESIONAL; INTRAMUSCULAR; INTRAVENOUS; SOFT TISSUE AS NEEDED
Status: DISCONTINUED | OUTPATIENT
Start: 2023-03-30 | End: 2023-03-30 | Stop reason: SURG

## 2023-03-30 RX ORDER — ACETAMINOPHEN 325 MG/1
TABLET ORAL
Status: COMPLETED
Start: 2023-03-30 | End: 2023-03-30

## 2023-03-30 RX ORDER — FENTANYL CITRATE 50 UG/ML
INJECTION, SOLUTION INTRAMUSCULAR; INTRAVENOUS AS NEEDED
Status: DISCONTINUED | OUTPATIENT
Start: 2023-03-30 | End: 2023-03-30 | Stop reason: SURG

## 2023-03-30 RX ORDER — MELOXICAM 15 MG/1
7.5 TABLET ORAL ONCE AS NEEDED
Status: COMPLETED | OUTPATIENT
Start: 2023-03-30 | End: 2023-03-30

## 2023-03-30 RX ORDER — CEFAZOLIN SODIUM 2 G/100ML
2 INJECTION, SOLUTION INTRAVENOUS ONCE
Status: COMPLETED | OUTPATIENT
Start: 2023-03-30 | End: 2023-03-30

## 2023-03-30 RX ORDER — KETOROLAC TROMETHAMINE 30 MG/ML
INJECTION, SOLUTION INTRAMUSCULAR; INTRAVENOUS AS NEEDED
Status: DISCONTINUED | OUTPATIENT
Start: 2023-03-30 | End: 2023-03-30 | Stop reason: SURG

## 2023-03-30 RX ORDER — SODIUM CHLORIDE 0.9 % (FLUSH) 0.9 %
10 SYRINGE (ML) INJECTION EVERY 12 HOURS SCHEDULED
Status: DISCONTINUED | OUTPATIENT
Start: 2023-03-30 | End: 2023-03-30 | Stop reason: HOSPADM

## 2023-03-30 RX ORDER — SODIUM CHLORIDE 9 MG/ML
40 INJECTION, SOLUTION INTRAVENOUS AS NEEDED
Status: DISCONTINUED | OUTPATIENT
Start: 2023-03-30 | End: 2023-03-30 | Stop reason: HOSPADM

## 2023-03-30 RX ORDER — SODIUM CHLORIDE 9 MG/ML
INJECTION, SOLUTION INTRAVENOUS AS NEEDED
Status: DISCONTINUED | OUTPATIENT
Start: 2023-03-30 | End: 2023-03-30 | Stop reason: HOSPADM

## 2023-03-30 RX ORDER — HEPARIN SODIUM 5000 [USP'U]/ML
INJECTION, SOLUTION INTRAVENOUS; SUBCUTANEOUS AS NEEDED
Status: DISCONTINUED | OUTPATIENT
Start: 2023-03-30 | End: 2023-03-30 | Stop reason: HOSPADM

## 2023-03-30 RX ORDER — MIDAZOLAM HYDROCHLORIDE 1 MG/ML
1 INJECTION INTRAMUSCULAR; INTRAVENOUS
Status: DISCONTINUED | OUTPATIENT
Start: 2023-03-30 | End: 2023-03-30 | Stop reason: HOSPADM

## 2023-03-30 RX ORDER — MELOXICAM 7.5 MG/1
7.5 TABLET ORAL DAILY
Qty: 20 TABLET | Refills: 0 | Status: SHIPPED | OUTPATIENT
Start: 2023-03-30

## 2023-03-30 RX ORDER — SODIUM CHLORIDE, SODIUM LACTATE, POTASSIUM CHLORIDE, CALCIUM CHLORIDE 600; 310; 30; 20 MG/100ML; MG/100ML; MG/100ML; MG/100ML
9 INJECTION, SOLUTION INTRAVENOUS CONTINUOUS
Status: DISCONTINUED | OUTPATIENT
Start: 2023-03-30 | End: 2023-03-30 | Stop reason: HOSPADM

## 2023-03-30 RX ORDER — BUPIVACAINE HYDROCHLORIDE AND EPINEPHRINE 5; 5 MG/ML; UG/ML
INJECTION, SOLUTION PERINEURAL AS NEEDED
Status: DISCONTINUED | OUTPATIENT
Start: 2023-03-30 | End: 2023-03-30 | Stop reason: HOSPADM

## 2023-03-30 RX ORDER — ACETAMINOPHEN 325 MG/1
650 TABLET ORAL ONCE
Status: COMPLETED | OUTPATIENT
Start: 2023-03-30 | End: 2023-03-30

## 2023-03-30 RX ORDER — OXYCODONE HYDROCHLORIDE 5 MG/1
5 TABLET ORAL EVERY 4 HOURS PRN
Qty: 10 TABLET | Refills: 0 | Status: SHIPPED | OUTPATIENT
Start: 2023-03-30

## 2023-03-30 RX ORDER — POLYETHYLENE GLYCOL 3350 17 G/17G
17 POWDER, FOR SOLUTION ORAL DAILY
Qty: 225 G | Refills: 0 | Status: SHIPPED | OUTPATIENT
Start: 2023-03-30

## 2023-03-30 RX ORDER — SODIUM CHLORIDE 0.9 % (FLUSH) 0.9 %
10 SYRINGE (ML) INJECTION AS NEEDED
Status: DISCONTINUED | OUTPATIENT
Start: 2023-03-30 | End: 2023-03-30 | Stop reason: HOSPADM

## 2023-03-30 RX ORDER — MAGNESIUM HYDROXIDE 1200 MG/15ML
LIQUID ORAL AS NEEDED
Status: DISCONTINUED | OUTPATIENT
Start: 2023-03-30 | End: 2023-03-30 | Stop reason: HOSPADM

## 2023-03-30 RX ORDER — ONDANSETRON 4 MG/1
4 TABLET, FILM COATED ORAL ONCE AS NEEDED
Status: DISCONTINUED | OUTPATIENT
Start: 2023-03-30 | End: 2023-03-30 | Stop reason: HOSPADM

## 2023-03-30 RX ADMIN — HYDROMORPHONE HYDROCHLORIDE 0.5 MG: 1 INJECTION, SOLUTION INTRAMUSCULAR; INTRAVENOUS; SUBCUTANEOUS at 16:46

## 2023-03-30 RX ADMIN — Medication 0.5 MG: at 15:12

## 2023-03-30 RX ADMIN — LIDOCAINE HYDROCHLORIDE 40 MG: 10 INJECTION, SOLUTION EPIDURAL; INFILTRATION; INTRACAUDAL; PERINEURAL at 12:53

## 2023-03-30 RX ADMIN — PROPOFOL 25 MCG/KG/MIN: 10 INJECTION, EMULSION INTRAVENOUS at 13:00

## 2023-03-30 RX ADMIN — LIDOCAINE HYDROCHLORIDE 0.2 ML: 10 INJECTION, SOLUTION EPIDURAL; INFILTRATION; INTRACAUDAL; PERINEURAL at 10:49

## 2023-03-30 RX ADMIN — FENTANYL CITRATE 50 MCG: 50 INJECTION, SOLUTION INTRAMUSCULAR; INTRAVENOUS at 16:36

## 2023-03-30 RX ADMIN — ROCURONIUM BROMIDE 50 MG: 10 INJECTION INTRAVENOUS at 12:53

## 2023-03-30 RX ADMIN — PROPOFOL 200 MG: 10 INJECTION, EMULSION INTRAVENOUS at 12:53

## 2023-03-30 RX ADMIN — FENTANYL CITRATE 50 MCG: 50 INJECTION, SOLUTION INTRAMUSCULAR; INTRAVENOUS at 14:49

## 2023-03-30 RX ADMIN — Medication 0.5 MG: at 15:32

## 2023-03-30 RX ADMIN — FENTANYL CITRATE 100 MCG: 50 INJECTION, SOLUTION INTRAMUSCULAR; INTRAVENOUS at 12:53

## 2023-03-30 RX ADMIN — CEFAZOLIN SODIUM 2 G: 2 INJECTION, SOLUTION INTRAVENOUS at 12:57

## 2023-03-30 RX ADMIN — DEXAMETHASONE SODIUM PHOSPHATE 4 MG: 4 INJECTION, SOLUTION INTRAMUSCULAR; INTRAVENOUS at 12:56

## 2023-03-30 RX ADMIN — Medication 0.5 MG: at 16:46

## 2023-03-30 RX ADMIN — ACETAMINOPHEN 1000 MG: 500 TABLET, FILM COATED ORAL at 11:11

## 2023-03-30 RX ADMIN — MELOXICAM 7.5 MG: 15 TABLET ORAL at 15:07

## 2023-03-30 RX ADMIN — FENTANYL CITRATE 50 MCG: 50 INJECTION, SOLUTION INTRAMUSCULAR; INTRAVENOUS at 14:31

## 2023-03-30 RX ADMIN — KETOROLAC TROMETHAMINE 30 MG: 30 INJECTION, SOLUTION INTRAMUSCULAR at 13:50

## 2023-03-30 RX ADMIN — HYDROMORPHONE HYDROCHLORIDE 0.5 MG: 1 INJECTION, SOLUTION INTRAMUSCULAR; INTRAVENOUS; SUBCUTANEOUS at 15:32

## 2023-03-30 RX ADMIN — GABAPENTIN 600 MG: 300 CAPSULE ORAL at 11:08

## 2023-03-30 RX ADMIN — SODIUM CHLORIDE, POTASSIUM CHLORIDE, SODIUM LACTATE AND CALCIUM CHLORIDE 9 ML/HR: 600; 310; 30; 20 INJECTION, SOLUTION INTRAVENOUS at 10:49

## 2023-03-30 RX ADMIN — ONDANSETRON 4 MG: 2 INJECTION INTRAMUSCULAR; INTRAVENOUS at 14:35

## 2023-03-30 RX ADMIN — ACETAMINOPHEN 650 MG: 325 TABLET ORAL at 17:19

## 2023-03-30 RX ADMIN — SUGAMMADEX 200 MG: 100 INJECTION, SOLUTION INTRAVENOUS at 13:51

## 2023-03-30 RX ADMIN — FAMOTIDINE 20 MG: 20 TABLET ORAL at 11:09

## 2023-03-30 RX ADMIN — HYDROMORPHONE HYDROCHLORIDE 0.5 MG: 1 INJECTION, SOLUTION INTRAMUSCULAR; INTRAVENOUS; SUBCUTANEOUS at 15:12

## 2023-03-30 NOTE — OP NOTE
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       Anesthesia: General endotracheal anesthesia    Findings: Superficial endometriosis of the right ovary and the posterior uterine serosa near the cervix.    Estimated Blood Loss: Less than 25 mL    Specimens: Uterus with fallopian tubes and right ovary    Complications:  None    Disposition: PACU - hemodynamically stable.      Procedure Details    The patient was seen in the Holding Room. The risks, benefits, complications, treatment options, and expected outcomes were discussed with the patient. The patient concurred with the proposed plan, giving informed consent. The patient was identified as Souleymane Grossman and the procedure verified as Robotic hysterectomy with unilateral salpingo-oophorectomy. A Time Out was held and the above information confirmed.    After induction of anesthesia, the patient was prepped and draped in the usual sterile manner. The uterus was sounded and fit with a Marjan-Koh intrauterine manipulator.    The operators gloves were changed.  The supraumbilical area was injected with a dilute marcaine solution.  An 8mm supraumbilical incision was made. Intraperitoneal access was gained with the optical noncutting trocar under direct visualization. CO2 was used to insufflate the abdominopelvic cavity to a pressure of approximately 15 mm Hg. She  was placed in maximum Trendelenburg. One additional 8mm port site was placed on the left and two additional 8mm ports sites were placed on the right under direct visualization.     The DaVinci system was docked and the procedure continued at the console. The anatomy was inspected. The left ureter was identified. The left fallopian tube was elevated and the parametrium was entered with monopolar cautery. The tube was dissected form the ovary and broad ligament with fulguration to the cornual region. The left uteroovarian ligament was then fulgurated and transected. The round ligament was fulguration and transected. The bladder was dissected across the midline with monopolar, sharp and blunt dissection. The right ureter was identified.  The right infundibulopelvic ligament was isolated, fulgurated and transected.  The round ligament was fulgurated and transected. The uterine vessels on the left and right were isolated, fulgurated and transected. A circumferential incision was made along the cervicovaginal junction and the uterus was removed from the vagina. The vaginal cuff was then closed with 2-0 vicryl and 0 vicryl. There was good hemostasis at low pressure.     The DaVinci system was undocked and the procedure was completed at the bedside. The trocars were removed. The skin incisions were closed with 3-0 plain and reinforced with skin glue. The counts were correct x 2. Patient was awakened and taken to recover in stable condition.       Yarelis Power MD  03/30/23  13:56 EDT

## 2023-03-30 NOTE — ANESTHESIA PROCEDURE NOTES
Airway  Urgency: elective    Date/Time: 3/30/2023 12:56 PM  Airway not difficult    General Information and Staff    Patient location during procedure: OR  SRNA: Shannan Alejandre SRNA  Indications and Patient Condition  Indications for airway management: airway protection    Preoxygenated: yes  MILS not maintained throughout  Mask difficulty assessment: 1 - vent by mask    Final Airway Details  Final airway type: endotracheal airway      Successful airway: ETT  Cuffed: yes   Successful intubation technique: direct laryngoscopy  Endotracheal tube insertion site: oral  Blade: Elaine  Blade size: 3  ETT size (mm): 7.0  Cormack-Lehane Classification: grade IIb - view of arytenoids or posterior of glottis only  Placement verified by: chest auscultation and capnometry   Measured from: lips  ETT/EBT  to lips (cm): 21  Number of attempts at approach: 2  Assessment: lips, teeth, and gum same as pre-op and atraumatic intubation    Additional Comments  Negative epigastric sounds, Breath sound equal bilaterally with symmetric chest rise and fall

## 2023-03-30 NOTE — ANESTHESIA PREPROCEDURE EVALUATION
Anesthesia Evaluation     Patient summary reviewed and Nursing notes reviewed   no history of anesthetic complications:  NPO Solid Status: > 8 hours  NPO Liquid Status: > 2 hours           Airway   Mallampati: II  TM distance: >3 FB  No difficulty expected  Dental - normal exam     Pulmonary - negative pulmonary ROS and normal exam    breath sounds clear to auscultation  Cardiovascular - normal exam    ECG reviewed  Rhythm: regular  Rate: normal    (+) hypertension, CAD, cardiac stents   (-) dysrhythmias, angina    ROS comment: 3/21 Echo:  ? Global and segmental LV wall motion is normal. Calculated left ventricular ejection fraction is 67%.  ? There is NO evidence of asymmetric septal hypertrophy.  ? The mitral and aortic valves are normal in structure and function.  ? Left atrial volume is mildly increased.  ? There is no evidence of pulmonary artery hypertension.  ? There are no other important findings on this study.      Neuro/Psych- negative ROS  GI/Hepatic/Renal/Endo    (+) obesity, morbid obesity,      Musculoskeletal     Abdominal    Substance History      OB/GYN          Other                          Anesthesia Plan    ASA 3     general     intravenous induction     Anesthetic plan, risks, benefits, and alternatives have been provided, discussed and informed consent has been obtained with: patient.    Plan discussed with CRNA.        CODE STATUS:

## 2023-03-30 NOTE — ANESTHESIA POSTPROCEDURE EVALUATION
Patient: Souleymane Grossman    Procedure Summary     Date: 03/30/23 Room / Location:  [facility]    Anesthesia Start: 1250 Anesthesia Stop:     Procedure: TOTAL ROBOTIC HYSTERECTOMY UNILATERAL (right) SALPINGOOPHORECTOMY WITH EXCISION OF ENDOMETRIOSIS (Abdomen) Diagnosis:     Surgeons: Yarelis Power MD Provider: Joseph Bauman Jr., MD    Anesthesia Type: general ASA Status: 3          Anesthesia Type: general    Vitals  Vitals Value Taken Time   /86 03/30/23 1418   Temp 98 °F (36.7 °C) 03/30/23 1418   Pulse 74 03/30/23 1418   Resp 16 03/30/23 1418   SpO2 100 % 03/30/23 1418           Post Anesthesia Care and Evaluation    Patient location during evaluation: PACU  Patient participation: complete - patient cannot participate  Level of consciousness: responsive to physical stimuli  Pain score: 0  Pain management: adequate    Airway patency: patent  Anesthetic complications: No anesthetic complications    Cardiovascular status: stable  Respiratory status: acceptable, nasal cannula and spontaneous ventilation  Hydration status: stable    Comments: Pt transferred to PACU with O2. Vital signs stable. Report to PACU RN and care accepted.

## 2023-03-30 NOTE — INTERVAL H&P NOTE
[facility] Pre-op    Full history and physical note from office is attached.    /91 (BP Location: Right arm, Patient Position: Lying)   Pulse 83   Temp 97.5 °F (36.4 °C) (Temporal)   Resp 18   SpO2 96%     Immunizations:  Influenza:  No  Pneumococcal:  No  Tetanus:  UTD  Covid : x2    Review of Systems:  Constitutional-- No fever, chills or sweats. No fatigue.  CV-- No chest pain, palpitation or syncope  Resp-- No SOB, cough, hemoptysis  Skin--No rashes or lesions    LAB Results:  Lab Results   Component Value Date    WBC 9.42 03/28/2023    HGB 13.9 03/28/2023    HCT 41.3 03/28/2023    MCV 89.8 03/28/2023     03/28/2023    NEUTROABS 6.31 03/28/2023    GLUCOSE 96 10/17/2022    BUN 10 10/17/2022    CREATININE 0.59 10/17/2022    EGFRIFNONA 115 03/17/2021    EGFRIFAFRI 118 02/03/2017     10/17/2022    K 4.0 10/17/2022     10/17/2022    CO2 24.0 10/17/2022    CALCIUM 8.9 10/17/2022    ALBUMIN 3.90 03/16/2021    AST 20 03/16/2021    ALT 20 03/16/2021    BILITOT 0.3 03/16/2021    INR 0.99 03/11/2022       Cancer Staging (if applicable)  Cancer Patient: __ yes __no __unknown__N/A; If yes, clinical stage T:__ N:__M:__, stage group or __N/A      Impression: Post endometrial ablation syndrome       Plan: TOTAL ROBOTIC HYSTERECTOMY UNILATERAL SALPINGOOPHORECTOMY WITH EXCISION OF ENDOMETRIOSIS      KISHORE Swanson   3/30/2023   11:01 EDT

## 2023-04-06 NOTE — DISCHARGE SUMMARY
[facility]  Post Operative Discharge Summary      Patient: Souleymane Grossman        MR#:[id number]    Admission  Diagnosis: Post endometrial ablation syndrome  Discharge Diagnosis:   Patient Active Problem List   Diagnosis   • Morbid obesity due to excess calories   • Anxiety   • Attention deficit hyperactivity disorder (ADHD)   • Tobacco use   • Elevated BP without diagnosis of hypertension   • Sinusitis   • Encounter for vitamin deficiency screening   • Chest pain   • Essential hypertension   • Marijuana use   • CAD (coronary artery disease)   • History of MI (myocardial infarction)   • Breast ptosis   • Post endometrial ablation syndrome   • Post endometrial ablation syndrome   • Endometriosis       Date of Admission: 3/30/2023  Date of Discharge:  3/30/2023     Procedures:  TRH with right salpingo oophorectomy and left salpingectomy             Service:  Gynecology    Hospital Course:  Patient underwent  and remained in the hospital for 0 days.  During that time she remained afebrile and hemodynamically stable.  On the day of discharge, she was eating, ambulating and voiding without difficulty.      Labs:    Lab Results (last 24 hours)     Procedure Component Value Units Date/Time    POC Pregnancy, Urine [[id number]]  (Normal) Collected: 03/30/23 1033    Specimen: Urine Updated: 03/30/23 1033     HCG, Urine, QL Negative     Lot Number [id number]     Internal Positive Control Positive     Internal Negative Control Negative     Expiration Date 2/24          Discharge Medications     Discharge Medications      New Medications      Instructions Start Date   acetaminophen 325 MG tablet  Commonly known as: TYLENOL   650 mg, Oral, Every 4 Hours PRN, Take every 4 hours while awake for 7 days then only as needed.      meloxicam 7.5 MG tablet  Commonly known as: MOBIC   7.5 mg, Oral, Daily, Take daily x 7 days. Then only as needed.      oxyCODONE 5 MG immediate release tablet  Commonly known as: ROXICODONE   5 mg, Oral,  Every 4 Hours PRN      polyethylene glycol 17 GM/SCOOP powder  Commonly known as: MIRALAX   17 g, Oral, Daily, Take daily x 2 weeks to prevent constipation, then as needed. May hold for loose stool         Continue These Medications      Instructions Start Date   ALPRAZolam 1 MG tablet  Commonly known as: XANAX   1 mg, Oral, Nightly PRN      amphetamine-dextroamphetamine 30 MG tablet  Commonly known as: ADDERALL   30 mg, Oral, 2 Times Daily, NOT CURRENTLY TAKING      fluticasone 50 MCG/ACT nasal spray  Commonly known as: FLONASE   1 spray, Nasal, 2 times daily      multivitamin with minerals tablet tablet   1 tablet, Oral, Daily      nitroglycerin 0.4 MG SL tablet  Commonly known as: Nitrostat   0.4 mg, Sublingual, Every 5 Minutes PRN, Take no more than 3 doses in 15 minutes.      OMEGA 3 PO   Oral      PROBIOTIC DAILY PO   Oral      TURMERIC PO   Oral             Discharge Disposition:  To Home    Discharge Condition:  Stable    Discharge Diet: regular    Activity at Discharge: pelvic rest    Follow-up Appointments  4 weeks    Yarelis Power MD  04/06/23  07:35 EDT

## 2023-05-06 ENCOUNTER — HOSPITAL ENCOUNTER (EMERGENCY)
Facility: HOSPITAL | Age: 38
Discharge: HOME | End: 2023-05-06
Attending: EMERGENCY MEDICINE
Payer: MEDICAID

## 2023-05-06 VITALS
HEIGHT: 64 IN | HEART RATE: 81 BPM | DIASTOLIC BLOOD PRESSURE: 86 MMHG | BODY MASS INDEX: 37.56 KG/M2 | RESPIRATION RATE: 14 BRPM | TEMPERATURE: 97.6 F | WEIGHT: 220 LBS | OXYGEN SATURATION: 96 % | SYSTOLIC BLOOD PRESSURE: 152 MMHG

## 2023-05-06 DIAGNOSIS — D72.829 LEUKOCYTOSIS, UNSPECIFIED TYPE: ICD-10-CM

## 2023-05-06 DIAGNOSIS — N93.9 VAGINAL BLEEDING: Primary | ICD-10-CM

## 2023-05-06 DIAGNOSIS — R11.0 NAUSEA: ICD-10-CM

## 2023-05-06 LAB
ALBUMIN SERPL-MCNC: 4.2 G/DL (ref 3.5–5.2)
ALBUMIN/GLOB SERPL: 1.4 G/DL
ALP SERPL-CCNC: 67 U/L (ref 39–117)
ALT SERPL W P-5'-P-CCNC: 17 U/L (ref 1–33)
ANION GAP SERPL CALCULATED.3IONS-SCNC: 12 MMOL/L (ref 5–15)
AST SERPL-CCNC: 19 U/L (ref 1–32)
BASOPHILS # BLD AUTO: 0.06 10*3/MM3 (ref 0–0.2)
BASOPHILS NFR BLD AUTO: 0.5 % (ref 0–1.5)
BILIRUB SERPL-MCNC: 0.4 MG/DL (ref 0–1.2)
BUN SERPL-MCNC: 14 MG/DL (ref 6–20)
BUN/CREAT SERPL: 26.9 (ref 7–25)
CALCIUM SPEC-SCNC: 9.1 MG/DL (ref 8.6–10.5)
CHLORIDE SERPL-SCNC: 102 MMOL/L (ref 98–107)
CO2 SERPL-SCNC: 22 MMOL/L (ref 22–29)
CREAT SERPL-MCNC: 0.52 MG/DL (ref 0.57–1)
DEPRECATED RDW RBC AUTO: 42.7 FL (ref 37–54)
EGFRCR SERPLBLD CKD-EPI 2021: 122.1 ML/MIN/1.73
EOSINOPHIL # BLD AUTO: 0.06 10*3/MM3 (ref 0–0.4)
EOSINOPHIL NFR BLD AUTO: 0.5 % (ref 0.3–6.2)
ERYTHROCYTE [DISTWIDTH] IN BLOOD BY AUTOMATED COUNT: 13.2 % (ref 12.3–15.4)
GLOBULIN UR ELPH-MCNC: 3 GM/DL
GLUCOSE SERPL-MCNC: 77 MG/DL (ref 65–99)
HCT VFR BLD AUTO: 41.4 % (ref 34–46.6)
HGB BLD-MCNC: 14.2 G/DL (ref 12–15.9)
IMM GRANULOCYTES # BLD AUTO: 0.05 10*3/MM3 (ref 0–0.05)
IMM GRANULOCYTES NFR BLD AUTO: 0.4 % (ref 0–0.5)
LYMPHOCYTES # BLD AUTO: 2.26 10*3/MM3 (ref 0.7–3.1)
LYMPHOCYTES NFR BLD AUTO: 17.7 % (ref 19.6–45.3)
MCH RBC QN AUTO: 30.3 PG (ref 26.6–33)
MCHC RBC AUTO-ENTMCNC: 34.3 G/DL (ref 31.5–35.7)
MCV RBC AUTO: 88.5 FL (ref 79–97)
MONOCYTES # BLD AUTO: 0.65 10*3/MM3 (ref 0.1–0.9)
MONOCYTES NFR BLD AUTO: 5.1 % (ref 5–12)
NEUTROPHILS NFR BLD AUTO: 75.8 % (ref 42.7–76)
NEUTROPHILS NFR BLD AUTO: 9.67 10*3/MM3 (ref 1.7–7)
NRBC BLD AUTO-RTO: 0 /100 WBC (ref 0–0.2)
PLATELET # BLD AUTO: 299 10*3/MM3 (ref 140–450)
PMV BLD AUTO: 10.3 FL (ref 6–12)
POTASSIUM SERPL-SCNC: 4.3 MMOL/L (ref 3.5–5.2)
PROT SERPL-MCNC: 7.2 G/DL (ref 6–8.5)
RBC # BLD AUTO: 4.68 10*6/MM3 (ref 3.77–5.28)
SODIUM SERPL-SCNC: 136 MMOL/L (ref 136–145)
WBC NRBC COR # BLD: 12.75 10*3/MM3 (ref 3.4–10.8)

## 2023-05-06 PROCEDURE — 63710000001 ONDANSETRON PER 8 MG: Performed by: EMERGENCY MEDICINE

## 2023-05-06 PROCEDURE — 99283 EMERGENCY DEPT VISIT LOW MDM: CPT

## 2023-05-06 PROCEDURE — 36415 COLL VENOUS BLD VENIPUNCTURE: CPT

## 2023-05-06 PROCEDURE — 85025 COMPLETE CBC W/AUTO DIFF WBC: CPT | Performed by: EMERGENCY MEDICINE

## 2023-05-06 PROCEDURE — 80053 COMPREHEN METABOLIC PANEL: CPT | Performed by: EMERGENCY MEDICINE

## 2023-05-06 RX ORDER — DICYCLOMINE HYDROCHLORIDE 10 MG/1
20 CAPSULE ORAL ONCE
Status: COMPLETED | OUTPATIENT
Start: 2023-05-06 | End: 2023-05-06

## 2023-05-06 RX ORDER — ONDANSETRON 4 MG/1
4 TABLET, FILM COATED ORAL ONCE
Status: COMPLETED | OUTPATIENT
Start: 2023-05-06 | End: 2023-05-06

## 2023-05-06 RX ORDER — ONDANSETRON 2 MG/ML
4 INJECTION INTRAMUSCULAR; INTRAVENOUS ONCE
Status: DISCONTINUED | OUTPATIENT
Start: 2023-05-06 | End: 2023-05-06

## 2023-05-06 RX ORDER — SODIUM CHLORIDE 0.9 % (FLUSH) 0.9 %
10 SYRINGE (ML) INJECTION AS NEEDED
Status: DISCONTINUED | OUTPATIENT
Start: 2023-05-06 | End: 2023-05-06

## 2023-05-06 RX ORDER — ONDANSETRON 4 MG/1
4 TABLET, FILM COATED ORAL EVERY 8 HOURS PRN
Qty: 15 TABLET | Refills: 0 | Status: SHIPPED | OUTPATIENT
Start: 2023-05-06

## 2023-05-06 RX ORDER — DICYCLOMINE HCL 20 MG
20 TABLET ORAL EVERY 8 HOURS PRN
Qty: 20 TABLET | Refills: 0 | Status: SHIPPED | OUTPATIENT
Start: 2023-05-06

## 2023-05-06 RX ADMIN — DICYCLOMINE HYDROCHLORIDE 20 MG: 10 CAPSULE ORAL at 13:53

## 2023-05-06 RX ADMIN — ONDANSETRON HYDROCHLORIDE 4 MG: 4 TABLET, FILM COATED ORAL at 13:11

## 2023-05-06 NOTE — ED PROVIDER NOTES
"Subjective   History of Present Illness  37-year-old female presents for evaluation of vaginal bleeding.  Of note, the patient is 5 days postop following a hysterectomy by Dr. Power of OB/GYN.  She states that yesterday she began experiencing light vaginal bleeding followed by nausea.  She notes that her child at home currently has a \"GI bug.\"  She states that her bleeding is less than a typical menstrual period.  Given her persistent symptoms she came to the ED to be evaluated.  She endorses mild accompanying lower abdominal cramps.  She denies any fevers.  She denies any significant abdominal pain.  She did not know whether or not her nausea was related to the vaginal bleeding or her sick contact and as a result came to the ED for evaluation today.  She denies any known dietary triggers for her symptoms.        Review of Systems   Gastrointestinal: Positive for nausea.        Abdominal cramps   Genitourinary: Positive for vaginal bleeding.   All other systems reviewed and are negative.      Past Medical History:   Diagnosis Date   • Abnormal Pap smear of cervix    • ADHD (attention deficit hyperactivity disorder)    • Anxiety     PCP manages   • Anxiety    • Blood in urine    • CAD (coronary artery disease) 03/16/2021   • Chills    • Endometriosis    • Essential hypertension 03/16/2021   • Fatigue    • Gestational diabetes 2017    took oral meds in 2014 pregnancy; recently dx in 2017 pregnancy    • Heart attack     02/19/2021   • History of COVID-19 2021   • History of MI (myocardial infarction) 03/16/2021    S/p NSTEMI 2/18/2021 with cath and THAIS to circumflex on 2/19/2021   • Kidney stones     history   • Low back pain    • Marijuana use 03/16/2021    occ vapes marijuana    • Mood changes    • Nausea    • Night sweats    • Obesity, Class III, BMI 40-49.9 (morbid obesity)    • Pelvic pain    • Urinary incontinence    • Weight gain        Allergies   Allergen Reactions   • Bactrim " [Sulfamethoxazole-Trimethoprim] Rash       Past Surgical History:   Procedure Laterality Date   • ABDOMINOPLASTY N/A 03/15/2022    Procedure: ABDOMINOPLASTY WITH LIPOSUCTION;  Surgeon: Juan Diego Ramírez MD;  Location:  [facility];  Service: Plastics;  Laterality: N/A;   • BREAST AUGMENTATION Bilateral 03/15/2022    Procedure: BREAST AUGMENTATION WITH SILICONE IMPLANTS BILATERAL;  Surgeon: Juan Diego Ramírez MD;  Location:  [facility];  Service: Plastics;  Laterality: Bilateral;   • COLPOSCOPY     • CORONARY ANGIOPLASTY WITH STENT PLACEMENT  02/2021   • CRYOTHERAPY      cervix   • CYSTOSCOPY BLADDER STONE LITHOTRIPSY  02/2017    reports has 2 remaining stones   • CYSTOSCOPY BLADDER STONE LITHOTRIPSY      kidney stones, h/o stent placement   • DILATATION AND CURETTAGE      08/2020   • ENDOMETRIAL ABLATION  08/2020   • HYSTEROSCOPY  08/2020   • LAPAROSCOPIC TUBAL LIGATION     • LIPOSUCTION CHIN N/A 10/25/2022    Procedure: LIPOSUCTION OF NECK;  Surgeon: Juan Diego Ramírez MD;  Location:  [facility];  Service: Plastics;  Laterality: N/A;   • LIPOSUCTION FLANK HIP THIGH Bilateral 03/15/2022    Procedure: LIPOSUCTION OF INNER THIGHS AND KNEES BILATERAL;  Surgeon: Juan Diego Ramírez MD;  Location:  [facility];  Service: Plastics;  Laterality: Bilateral;   • MASTOPEXY Bilateral 03/15/2022    Procedure: MASTOPEXY BILATERAL;  Surgeon: Juan Diego Ramírez MD;  Location:  [facility];  Service: Plastics;  Laterality: Bilateral;   • MASTOPEXY Bilateral 10/25/2022    Procedure: BREAST LIFT REVISION BILATERAL;  Surgeon: Juan Diego Ramírez MD;  Location:  [facility];  Service: Plastics;  Laterality: Bilateral;   • TOTAL LAPAROSCOPIC HYSTERECTOMY SALPINGO OOPHORECTOMY N/A 3/30/2023    Procedure: TOTAL ROBOTIC HYSTERECTOMY UNILATERAL (right) SALPINGOOPHORECTOMY WITH EXCISION OF ENDOMETRIOSIS;  Surgeon: Yarelis Power MD;  Location:  [facility];  Service: Robotics - DaVinci;  Laterality: N/A;   • WISDOM TOOTH EXTRACTION          Family History   Problem Relation Age of Onset   • Hypertension Father    • Anxiety disorder Father    • Arthritis Father    • Mental illness Father    • Diabetes Father    • Diabetes Mother    • Anxiety disorder Mother    • Arthritis Mother    • Cancer Mother    • Early death Mother    • Cancer Maternal Grandmother    • Ovarian cancer Maternal Grandmother    • Diabetes Brother    • Vision loss Brother    • Colon cancer Other    • No Known Problems Daughter    • No Known Problems Brother    • No Known Problems Brother    • No Known Problems Daughter    • Breast cancer Neg Hx    • Uterine cancer Neg Hx        Social History     Socioeconomic History   • Marital status:    Tobacco Use   • Smoking status: Former     Packs/day: 0.50     Years: 15.00     Pack years: 7.50     Types: Cigarettes     Quit date: 2022     Years since quittin.3   • Smokeless tobacco: Never   Vaping Use   • Vaping Use: Former   • Quit date: 2022   • Substances: Nicotine, THC   Substance and Sexual Activity   • Alcohol use: Not Currently   • Drug use: Never   • Sexual activity: Defer           Objective   Physical Exam  Vitals and nursing note reviewed.   Constitutional:       General: She is not in acute distress.     Appearance: She is well-developed. She is not diaphoretic.      Comments: Well-appearing female in no acute distress   HENT:      Head: Normocephalic and atraumatic.   Eyes:      Pupils: Pupils are equal, round, and reactive to light.   Cardiovascular:      Rate and Rhythm: Normal rate and regular rhythm.      Heart sounds: Normal heart sounds. No murmur heard.    No friction rub. No gallop.   Pulmonary:      Effort: Pulmonary effort is normal. No respiratory distress.      Breath sounds: Normal breath sounds. No wheezing or rales.   Abdominal:      General: Bowel sounds are normal. There is no distension.      Palpations: Abdomen is soft. There is no mass.      Tenderness: There is no abdominal tenderness.  There is no guarding or rebound.      Comments: No focal abdominal tenderness, no peritoneal signs, no pain out of proportion to exam   Genitourinary:     Comments: Scant blood noted within vaginal vault, no active bleeding noted, no tears present  Musculoskeletal:         General: Normal range of motion.   Skin:     General: Skin is warm and dry.      Findings: No erythema or rash.   Neurological:      Mental Status: She is alert and oriented to person, place, and time.      Comments: Normal gait   Psychiatric:         Mood and Affect: Mood normal.         Thought Content: Thought content normal.         Judgment: Judgment normal.         Procedures           ED Course  ED Course as of 05/06/23 1605   Sat May 06, 2023   1204 37-year-old female presents for evaluation of vaginal bleeding.  Of note, the patient is 5 days postop following hysterectomy by Dr. Power.  She states that yesterday she began experiencing light vaginal bleeding followed by nausea.  She notes that her child at home currently has a GI bug.  She notes that her bleeding is less than a menstrual period.  Given her symptoms, she came to the emergency department to be evaluated.  On arrival, the patient is nontoxic-appearing.  Benign exam.  Nonsurgical abdomen.  Vital signs are reassuring.  We will obtain labs, and we will reassess following initial interventions and pelvic examination. [DD]   1236 Hemoglobin is normal at 14. [DD]   1304 Labs are otherwise unrevealing.  On pelvic exam, the patient has no visible tears and very scant blood within her vaginal canal.  Nonsurgical abdomen.  I am not concerned about a postoperative complication at this point given her well appearance and overall clinical presentation.  I feel that her nausea is most likely secondary to the same illness that her child currently has. [DD]   1306 Patient reassured.  Prescription for Zofran as needed.  She will follow-up with her OB/GYN, Dr. Power, within the next week.   Agreeable with plan and given appropriate strict return precautions. [DD]      ED Course User Index  [DD] Vania, Domenic Adamson MD                                          Recent Results (from the past 24 hour(s))   CBC Auto Differential    Collection Time: 05/06/23 12:12 PM    Specimen: Blood   Result Value Ref Range    WBC 12.75 (H) 3.40 - 10.80 10*3/mm3    RBC 4.68 3.77 - 5.28 10*6/mm3    Hemoglobin 14.2 12.0 - 15.9 g/dL    Hematocrit 41.4 34.0 - 46.6 %    MCV 88.5 79.0 - 97.0 fL    MCH 30.3 26.6 - 33.0 pg    MCHC 34.3 31.5 - 35.7 g/dL    RDW 13.2 12.3 - 15.4 %    RDW-SD 42.7 37.0 - 54.0 fl    MPV 10.3 6.0 - 12.0 fL    Platelets 299 140 - 450 10*3/mm3    Neutrophil % 75.8 42.7 - 76.0 %    Lymphocyte % 17.7 (L) 19.6 - 45.3 %    Monocyte % 5.1 5.0 - 12.0 %    Eosinophil % 0.5 0.3 - 6.2 %    Basophil % 0.5 0.0 - 1.5 %    Immature Grans % 0.4 0.0 - 0.5 %    Neutrophils, Absolute 9.67 (H) 1.70 - 7.00 10*3/mm3    Lymphocytes, Absolute 2.26 0.70 - 3.10 10*3/mm3    Monocytes, Absolute 0.65 0.10 - 0.90 10*3/mm3    Eosinophils, Absolute 0.06 0.00 - 0.40 10*3/mm3    Basophils, Absolute 0.06 0.00 - 0.20 10*3/mm3    Immature Grans, Absolute 0.05 0.00 - 0.05 10*3/mm3    nRBC 0.0 0.0 - 0.2 /100 WBC   Comprehensive Metabolic Panel    Collection Time: 05/06/23 12:12 PM    Specimen: Blood   Result Value Ref Range    Glucose 77 65 - 99 mg/dL    BUN 14 6 - 20 mg/dL    Creatinine 0.52 (L) 0.57 - 1.00 mg/dL    Sodium 136 136 - 145 mmol/L    Potassium 4.3 3.5 - 5.2 mmol/L    Chloride 102 98 - 107 mmol/L    CO2 22.0 22.0 - 29.0 mmol/L    Calcium 9.1 8.6 - 10.5 mg/dL    Total Protein 7.2 6.0 - 8.5 g/dL    Albumin 4.2 3.5 - 5.2 g/dL    ALT (SGPT) 17 1 - 33 U/L    AST (SGOT) 19 1 - 32 U/L    Alkaline Phosphatase 67 39 - 117 U/L    Total Bilirubin 0.4 0.0 - 1.2 mg/dL    Globulin 3.0 gm/dL    A/G Ratio 1.4 g/dL    BUN/Creatinine Ratio 26.9 (H) 7.0 - 25.0    Anion Gap 12.0 5.0 - 15.0 mmol/L    eGFR 122.1 >60.0 mL/min/1.73     Note: In addition  "to lab results from this visit, the labs listed above may include labs taken at another facility or during a different encounter within the last 24 hours. Please correlate lab times with ED admission and discharge times for further clarification of the services performed during this visit.    No orders to display     Vitals:    05/06/23 1129   BP: 152/86   Patient Position: Sitting   Pulse: 81   Resp: 14   Temp: 97.6 °F (36.4 °C)   TempSrc: Oral   SpO2: 96%   Weight: 99.8 kg (220 lb)   Height: 162.6 cm (64\")     Medications   ondansetron (ZOFRAN) tablet 4 mg (4 mg Oral Given 5/6/23 1311)   dicyclomine (BENTYL) capsule 20 mg (20 mg Oral Given 5/6/23 1353)     ECG/EMG Results (last 24 hours)     ** No results found for the last 24 hours. **        No orders to display           MDM    Final diagnoses:   Vaginal bleeding   Nausea   Leukocytosis, unspecified type       ED Disposition  ED Disposition     ED Disposition   Discharge    Condition   Stable    Comment   --             Yarelis Power MD  [address] [place] [address]  [phone]    In 1 week           Medication List      New Prescriptions    dicyclomine 20 MG tablet  Commonly known as: BENTYL  Take 1 tablet by mouth Every 8 (Eight) Hours As Needed (pain, cramps).     ondansetron 4 MG tablet  Commonly known as: ZOFRAN  Take 1 tablet by mouth Every 8 (Eight) Hours As Needed for Nausea.           Where to Get Your Medications      These medications were sent to [facility] - [place], [place] - [address] [place] [address] [phone] [facility] [phone] [address], [facility] [address]    Phone: [phone]   · dicyclomine 20 MG tablet  · ondansetron 4 MG tablet          Domenic Caro MD  05/06/23 7439    "

## 2025-01-08 ENCOUNTER — LAB (OUTPATIENT)
Dept: LAB | Facility: HOSPITAL | Age: 40
End: 2025-01-08
Payer: MEDICAID

## 2025-01-08 ENCOUNTER — TRANSCRIBE ORDERS (OUTPATIENT)
Dept: LAB | Facility: HOSPITAL | Age: 40
End: 2025-01-08
Payer: MEDICAID

## 2025-01-08 DIAGNOSIS — N95.1 SYMPTOMATIC MENOPAUSAL OR FEMALE CLIMACTERIC STATES: ICD-10-CM

## 2025-01-08 DIAGNOSIS — N95.1 SYMPTOMATIC MENOPAUSAL OR FEMALE CLIMACTERIC STATES: Primary | ICD-10-CM

## 2025-01-08 LAB
ESTRADIOL SERPL HS-MCNC: 39.7 PG/ML
FSH SERPL-ACNC: 8.41 MIU/ML
PROGEST SERPL-MCNC: 0.15 NG/ML
TESTOST SERPL-MCNC: 36.9 NG/DL (ref 8.4–48.1)

## 2025-01-08 PROCEDURE — 36415 COLL VENOUS BLD VENIPUNCTURE: CPT

## 2025-01-08 PROCEDURE — 84403 ASSAY OF TOTAL TESTOSTERONE: CPT

## 2025-01-08 PROCEDURE — 82670 ASSAY OF TOTAL ESTRADIOL: CPT

## 2025-01-08 PROCEDURE — 84144 ASSAY OF PROGESTERONE: CPT

## 2025-01-08 PROCEDURE — 83001 ASSAY OF GONADOTROPIN (FSH): CPT

## (undated) DEVICE — TBG SXN LIPECTOMY 108IN

## (undated) DEVICE — NDL HYPO ECLPS SFTY 25G 1 1/2IN

## (undated) DEVICE — SNAP KOVER: Brand: UNBRANDED

## (undated) DEVICE — SUT SILK 2/0 TIES 18IN A185H

## (undated) DEVICE — TP PLSTC CLR TRNSPORE 1IN SURG

## (undated) DEVICE — DRSNG GZ PETROLTM XEROFORM CURAD 1X8IN STRL

## (undated) DEVICE — GLV SURG BIOGEL LTX PF 7 1/2

## (undated) DEVICE — NDL HYPO ECLPS SFTY 22G 1 1/2IN

## (undated) DEVICE — TRAP FLD MINIVAC MEGADYNE 100ML

## (undated) DEVICE — ANTIBACTERIAL UNDYED BRAIDED (POLYGLACTIN 910), SYNTHETIC ABSORBABLE SUTURE: Brand: COATED VICRYL

## (undated) DEVICE — MANIP UTER RUMI 2 KOH EFFICIENT 3.5CM BL

## (undated) DEVICE — ELECTRD BLD EZ CLN MOD XLNG 2.75IN

## (undated) DEVICE — DRSNG SURESITE WNDW 2.38X2.75

## (undated) DEVICE — DRSNG WND BORDR/ADHS NONADHR/GZ LF 2X2IN STRL

## (undated) DEVICE — CVR HNDL LIGHT RIGID

## (undated) DEVICE — TIP COVER ACCESSORY

## (undated) DEVICE — SUT MNCRYL PLS ANTIB UD 4/0 PS2 18IN

## (undated) DEVICE — MANIP UTER RUMI TP 6.7MMX8CM BLU

## (undated) DEVICE — APPL CHLORAPREP TINTED 26ML TEAL

## (undated) DEVICE — COLUMN DRAPE

## (undated) DEVICE — SUT MNCRYL 5/0 PC1 18IN Y834G

## (undated) DEVICE — PATIENT RETURN ELECTRODE, SINGLE-USE, CONTACT QUALITY MONITORING, ADULT, WITH 9FT CORD, FOR PATIENTS WEIGING OVER 33LBS. (15KG): Brand: MEGADYNE

## (undated) DEVICE — PROXIMATE RH ROTATING HEAD SKIN STAPLERS (35 WIDE) CONTAINS 35 STAINLESS STEEL STAPLES: Brand: PROXIMATE

## (undated) DEVICE — SUT VIC 2/0 CT2 27IN J269H

## (undated) DEVICE — BLANKT WARM LOWR/BDY 100X120CM

## (undated) DEVICE — STRIP,CLOSURE,WOUND,MEDI-STRIP,1/2X4: Brand: MEDLINE

## (undated) DEVICE — SYR CONTRL PRESS/LO FIX/M/LL W/THMB/RNG 10ML

## (undated) DEVICE — SUT MNCRYL PLS ANTIB UD 3/0 PS2 27IN

## (undated) DEVICE — TOTAL TRAY, 16FR 10ML SIL FOLEY, URN: Brand: MEDLINE

## (undated) DEVICE — TBG PENCL TELESCP MEGADYNE SMOKE EVAC 10FT

## (undated) DEVICE — BLANKT WARM UPPR/BDY ARM/OUT 57X196CM

## (undated) DEVICE — BNDG ELAS W/CLIP 6IN 10YD LF STRL

## (undated) DEVICE — DISPOSABLE BIPOLAR FORCEPS 7 3/4" (19.7CM) SCOVILLE BAYONET, INSULATED, 1.5MM TIP AND 12 FT. (3.6M) CABLE: Brand: KIRWAN

## (undated) DEVICE — SUT GUT PLN FAST ABS 5/0 PC1 18IN 1915G

## (undated) DEVICE — DRSNG PAD ABD 8X10IN STRL

## (undated) DEVICE — DRAPE,TOP,102X53,STERILE: Brand: MEDLINE

## (undated) DEVICE — Device

## (undated) DEVICE — INTENDED USE FOR SURGICAL MARKING ON INTACT SKIN, ALSO PROVIDES A PERMANENT METHOD OF IDENTIFYING OBJECTS IN THE OPERATING ROOM: Brand: WRITESITE® REGULAR TIP SKIN MARKER

## (undated) DEVICE — ADHS LIQ MASTISOL 2/3ML

## (undated) DEVICE — CLTH CLENS READYCLEANSE PERI CARE PK/5

## (undated) DEVICE — ADHS SKIN PREMIERPRO EXOFIN TOPICAL HI/VISC .5ML

## (undated) DEVICE — SUT ETHLN 0 LP XLH 72IN D5854

## (undated) DEVICE — BIOPATCH™ ANTIMICROBIAL DRESSING WITH CHLORHEXIDINE GLUCONATE IS A HYDROPHILLIC POLYURETHANE ABSORPTIVE FOAM WITH CHLORHEXIDINE GLUCONATE (CHG) WHICH INHIBITS BACTERIAL GROWTH UNDER THE DRESSING. THE DRESSING IS INTENDED TO BE USED TO ABSORB EXUDATE, COVER A WOUND CAUSED BY VASCULAR AND NONVASCULAR PERCUTANEOUS MEDICAL DEVICES DURING SURGERY, AS WELL AS REDUCE LOCAL INFECTION AND COLONIZATION OF MICROORGANISMS.: Brand: BIOPATCH

## (undated) DEVICE — BANDAGE,GAUZE,BULKEE II,4.5"X4.1YD,STRL: Brand: MEDLINE

## (undated) DEVICE — ST TBG CONN PNEUMOCLEAR EVAC SMOKE HEAT/HUMID

## (undated) DEVICE — OCCL COLPO PNEUMO  STRL

## (undated) DEVICE — TRENDELENBURG WINGPAD POSITIONING KIT DELUXE - WITHOUT BODY STRAP: Brand: SOULE MEDICAL

## (undated) DEVICE — BNDR ABD PREMIUM/UNIV 10IN 27TO48IN

## (undated) DEVICE — GLV SURG DERMASSURE GRN LF PF 7.0

## (undated) DEVICE — PREMIUM DRY TRAY LF: Brand: MEDLINE INDUSTRIES, INC.

## (undated) DEVICE — BLADELESS OBTURATOR: Brand: WECK VISTA

## (undated) DEVICE — BLANKT WARM PED/CHLD 130X92CM DISP LF

## (undated) DEVICE — SUT ETHLN 5/0 P3 18IN 698G

## (undated) DEVICE — 3M™ STERI-STRIP™ REINFORCED ADHESIVE SKIN CLOSURES, R1547, 1/2 IN X 4 IN (12 MM X 100 MM), 6 STRIPS/ENVELOPE: Brand: 3M™ STERI-STRIP™

## (undated) DEVICE — PK CHST BRST 10

## (undated) DEVICE — ANTIBACTERIAL UNDYED BRAIDED (POLYGLACTIN 910), SYNTHETIC ABSORBABLE SURGICAL SUTURE: Brand: COATED VICRYL

## (undated) DEVICE — CANNULA SEAL

## (undated) DEVICE — LAPAROVUE VISIBILITY SYSTEM LAPAROSCOPIC SOLUTIONS: Brand: LAPAROVUE

## (undated) DEVICE — ENT MINOR: Brand: MEDLINE INDUSTRIES, INC.

## (undated) DEVICE — ARM DRAPE

## (undated) DEVICE — LINER CANSTR SXN HERCULES

## (undated) DEVICE — SCRB SURG BACTOSHIELD CHG 4PCT 4OZ

## (undated) DEVICE — INTENDED FOR TISSUE SEPARATION, AND OTHER PROCEDURES THAT REQUIRE A SHARP SURGICAL BLADE TO PUNCTURE OR CUT.: Brand: BARD-PARKER ® STAINLESS STEEL BLADES

## (undated) DEVICE — PK MAJ GYN DAVINCI 10